# Patient Record
Sex: FEMALE | Race: WHITE | HISPANIC OR LATINO | Employment: UNEMPLOYED | URBAN - METROPOLITAN AREA
[De-identification: names, ages, dates, MRNs, and addresses within clinical notes are randomized per-mention and may not be internally consistent; named-entity substitution may affect disease eponyms.]

---

## 2023-04-17 ENCOUNTER — OFFICE VISIT (OUTPATIENT)
Dept: PRIMARY CARE | Facility: CLINIC | Age: 48
End: 2023-04-17
Payer: COMMERCIAL

## 2023-04-17 VITALS
BODY MASS INDEX: 42.78 KG/M2 | RESPIRATION RATE: 16 BRPM | OXYGEN SATURATION: 99 % | HEART RATE: 88 BPM | HEIGHT: 65 IN | WEIGHT: 256.8 LBS | DIASTOLIC BLOOD PRESSURE: 68 MMHG | SYSTOLIC BLOOD PRESSURE: 136 MMHG | TEMPERATURE: 97.4 F

## 2023-04-17 DIAGNOSIS — D17.23 LIPOMA OF RIGHT LOWER EXTREMITY: ICD-10-CM

## 2023-04-17 DIAGNOSIS — H61.21 IMPACTED CERUMEN OF RIGHT EAR: ICD-10-CM

## 2023-04-17 DIAGNOSIS — J30.2 SEASONAL ALLERGIES: Primary | ICD-10-CM

## 2023-04-17 DIAGNOSIS — E11.9 TYPE 2 DIABETES MELLITUS WITHOUT COMPLICATION, WITHOUT LONG-TERM CURRENT USE OF INSULIN (MULTI): ICD-10-CM

## 2023-04-17 DIAGNOSIS — E66.01 CLASS 3 SEVERE OBESITY DUE TO EXCESS CALORIES WITH SERIOUS COMORBIDITY AND BODY MASS INDEX (BMI) OF 40.0 TO 44.9 IN ADULT (MULTI): ICD-10-CM

## 2023-04-17 DIAGNOSIS — Z00.00 PHYSICAL EXAM: ICD-10-CM

## 2023-04-17 LAB — POC HEMOGLOBIN A1C: 6.7 % (ref 4.2–6.5)

## 2023-04-17 PROCEDURE — 1036F TOBACCO NON-USER: CPT | Performed by: NURSE PRACTITIONER

## 2023-04-17 PROCEDURE — 3075F SYST BP GE 130 - 139MM HG: CPT | Performed by: NURSE PRACTITIONER

## 2023-04-17 PROCEDURE — 3078F DIAST BP <80 MM HG: CPT | Performed by: NURSE PRACTITIONER

## 2023-04-17 PROCEDURE — 3008F BODY MASS INDEX DOCD: CPT | Performed by: NURSE PRACTITIONER

## 2023-04-17 PROCEDURE — 99214 OFFICE O/P EST MOD 30 MIN: CPT | Performed by: NURSE PRACTITIONER

## 2023-04-17 PROCEDURE — 4010F ACE/ARB THERAPY RXD/TAKEN: CPT | Performed by: NURSE PRACTITIONER

## 2023-04-17 PROCEDURE — 83036 HEMOGLOBIN GLYCOSYLATED A1C: CPT | Performed by: NURSE PRACTITIONER

## 2023-04-17 RX ORDER — SEMAGLUTIDE 1.34 MG/ML
0.5 INJECTION, SOLUTION SUBCUTANEOUS
COMMUNITY
Start: 2022-01-06 | End: 2023-07-27 | Stop reason: SDUPTHER

## 2023-04-17 RX ORDER — METFORMIN HYDROCHLORIDE 500 MG/1
1000 TABLET, EXTENDED RELEASE ORAL 2 TIMES DAILY
COMMUNITY
Start: 2018-08-03 | End: 2024-02-26 | Stop reason: SDUPTHER

## 2023-04-17 RX ORDER — LISINOPRIL 10 MG/1
10 TABLET ORAL DAILY
COMMUNITY
Start: 2022-02-04

## 2023-04-17 RX ORDER — ASPIRIN 81 MG/1
81 TABLET ORAL DAILY
COMMUNITY
Start: 2019-09-11

## 2023-04-17 RX ORDER — ROSUVASTATIN CALCIUM 10 MG/1
10 TABLET, COATED ORAL DAILY
COMMUNITY
End: 2023-10-19 | Stop reason: SINTOL

## 2023-04-17 RX ORDER — CLOPIDOGREL BISULFATE 75 MG/1
75 TABLET ORAL DAILY
COMMUNITY
Start: 2022-03-04

## 2023-04-17 RX ORDER — METOPROLOL SUCCINATE 25 MG/1
25 TABLET, EXTENDED RELEASE ORAL DAILY
COMMUNITY

## 2023-04-17 RX ORDER — ICOSAPENT ETHYL 1 G/1
2 CAPSULE ORAL
COMMUNITY
End: 2023-07-25 | Stop reason: SDUPTHER

## 2023-04-17 RX ORDER — MONTELUKAST SODIUM 10 MG/1
10 TABLET ORAL NIGHTLY
Qty: 30 TABLET | Refills: 3 | Status: SHIPPED | OUTPATIENT
Start: 2023-04-17 | End: 2023-04-24 | Stop reason: SINTOL

## 2023-04-17 RX ORDER — DAPAGLIFLOZIN 5 MG/1
5 TABLET, FILM COATED ORAL DAILY
COMMUNITY
Start: 2021-11-08

## 2023-04-17 ASSESSMENT — ENCOUNTER SYMPTOMS
NAUSEA: 0
ABDOMINAL PAIN: 0
FREQUENCY: 0
DYSURIA: 0

## 2023-04-17 ASSESSMENT — PATIENT HEALTH QUESTIONNAIRE - PHQ9
SUM OF ALL RESPONSES TO PHQ9 QUESTIONS 1 AND 2: 0
1. LITTLE INTEREST OR PLEASURE IN DOING THINGS: NOT AT ALL
2. FEELING DOWN, DEPRESSED OR HOPELESS: NOT AT ALL

## 2023-04-17 ASSESSMENT — PAIN SCALES - GENERAL: PAINLEVEL: 2

## 2023-04-17 NOTE — PROGRESS NOTES
"Subjective   Patient ID: Patricia Gibbs is a 47 y.o. female who presents for New Patient Visit (Establish Care) and Ear Problem (R Ear blockage).    HPI     Patient presents to clinic to establish care.  She tells me she was last seen by her PCP February 2021.  She states she has had an insurance change and needed a new provider.    Patient with past medical history of diabetes mellitus, MI 2019 with stent placement.  She has been followed by cardiologist Dr. Sepulveda at [facility] last seen January 2023.    Patient also being followed by endocrinology last seen by endocrinology November 22, 2022.  She tells me her last hemoglobin A1c was 6.8.    Today patient complains of seasonal allergies watery itchy eyes sneezing.  She states she has been using over-the-counter allergy medication without relief of symptoms.  She also complains of right ear feeling blocked.    Last seen by gynecology July 2022 when she had an IUD placed.      Patient also has concerns about her weight and would like to be referred to weight loss center.  She states she has tried multiple diets without success in weight loss.  Appetite normal bowel and bladder normal.    Unsuccessful right ear irrigation patient will use debrox and return in 2 days.      Review of Systems   HENT:          See HPI   Gastrointestinal:  Negative for abdominal pain and nausea.   Genitourinary:  Negative for dysuria, frequency and pelvic pain.   Skin:         Lump back of right thigh x years   All other systems reviewed and are negative.        Objective   /68 (BP Location: Left arm, Patient Position: Sitting, BP Cuff Size: Large adult)   Pulse 88   Temp 36.3 °C (97.4 °F) (Temporal)   Resp 16   Ht 1.638 m (5' 4.5\")   Wt 116 kg (256 lb 12.8 oz)   LMP 04/10/2023 (Exact Date)   SpO2 99%   BMI 43.40 kg/m²     Physical Exam  Constitutional:       Appearance: Normal appearance.   HENT:      Right Ear: There is impacted cerumen.      Left Ear: " Tympanic membrane and external ear normal.      Mouth/Throat:      Mouth: Mucous membranes are moist.      Pharynx: Oropharynx is clear.   Eyes:      Pupils: Pupils are equal, round, and reactive to light.   Cardiovascular:      Rate and Rhythm: Normal rate and regular rhythm.   Pulmonary:      Effort: Pulmonary effort is normal.      Breath sounds: Normal breath sounds.   Abdominal:      General: Bowel sounds are normal.      Palpations: Abdomen is soft.   Musculoskeletal:         General: Normal range of motion.      Cervical back: Normal range of motion and neck supple.   Skin:     General: Skin is warm and dry.      Comments: R/O Lipoma mid right thigh    Neurological:      Mental Status: She is alert and oriented to person, place, and time.   Psychiatric:         Mood and Affect: Mood normal.         Assessment/Plan   Problem List Items Addressed This Visit    None  Visit Diagnoses       Seasonal allergies    -  Primary    Relevant Medications    montelukast (Singulair) 10 mg tablet    Type 2 diabetes mellitus without complication, without long-term current use of insulin (CMS/[facility])        Relevant Orders    POCT glycosylated hemoglobin (Hb A1C) manually resulted (Completed)    CBC    Comprehensive Metabolic Panel    Lipid Panel    Hemoglobin A1C    Tsh With Reflex To Free T4 If Abnormal    Vitamin D 25-Hydroxy,Total    Physical exam        Relevant Orders    BI mammo bilateral screening tomosynthesis    Cologuard® colon cancer screening    Impacted cerumen of right ear        Relevant Medications    carbamide peroxide (Debrox) 6.5 % otic solution  Return in 2 days for ear irrigation    Other Relevant Orders    Ear cerumen removal    Class 3 severe obesity due to excess calories with serious comorbidity and body mass index (BMI) of 40.0 to 44.9 in adult (CMS/[facility])        Relevant Orders    Referral to Comprehensive Weight Management    Referral to Nutrition Services    Lipoma of right lower extremity         Relevant Orders    Referral to Dermatology

## 2023-04-17 NOTE — PATIENT INSTRUCTIONS
Your Hemoglobin A1c is   Continue medications as prescribed.  Healthy diet and drink plenty of water.  Please have labs drawn-You will be notified with abnormal results only.    Please set up MY CHART.    Please schedule all necessary health screenings ophthalmology and dentist.    Follow-up in 3 months.  Call office any new concerns.  Use Debrox in right ear x 2 days and return  for ear irrigation.  Follow-up with dermatology.

## 2023-04-19 ENCOUNTER — APPOINTMENT (OUTPATIENT)
Dept: PRIMARY CARE | Facility: CLINIC | Age: 48
End: 2023-04-19
Payer: COMMERCIAL

## 2023-04-24 ENCOUNTER — TELEPHONE (OUTPATIENT)
Dept: PRIMARY CARE | Facility: CLINIC | Age: 48
End: 2023-04-24

## 2023-04-24 DIAGNOSIS — J30.2 SEASONAL ALLERGIES: Primary | ICD-10-CM

## 2023-04-24 RX ORDER — LORATADINE 10 MG/1
10 TABLET ORAL DAILY
Qty: 30 TABLET | Refills: 2 | Status: SHIPPED | OUTPATIENT
Start: 2023-04-24 | End: 2023-05-24

## 2023-04-24 NOTE — PROGRESS NOTES
Patient notified office stating she thinks she could possibly have a reaction to Singulair that was prescribed for her on 4/17/23.  Patient complains of itching but denies having a rash or trouble breathing.  Advised to discontinue Singulair we will have patient started Claritin for her seasonal allergies.

## 2023-04-24 NOTE — TELEPHONE ENCOUNTER
Pt. Calling states possible reaction to Singulair 10mg.  Started taking on Tuesday, sx of itching on Thursday. Pt. States Rx stops when taking Benadryl.     Would also like to note Earwax was removed after coming home from her OV on Monday 4/17 with no success during Ear Irrigation.

## 2023-05-04 DIAGNOSIS — N64.89 BREAST ASYMMETRY: Primary | ICD-10-CM

## 2023-05-04 NOTE — PROGRESS NOTES
Phoned patient to discuss results of mammogram.  Bilateral breast ultrasound ordered as recommended by radiologist for mammogram 5/4/2023.

## 2023-06-01 DIAGNOSIS — J30.2 SEASONAL ALLERGIES: ICD-10-CM

## 2023-06-02 RX ORDER — LORATADINE 10 MG/1
TABLET ORAL
Qty: 30 TABLET | Refills: 0 | Status: SHIPPED | OUTPATIENT
Start: 2023-06-02 | End: 2023-10-19 | Stop reason: ALTCHOICE

## 2023-06-15 NOTE — TELEPHONE ENCOUNTER
Phoned patient to discuss results of breast ultrasound.  Patient states after testing radiologist spoke with her.  Impression is right breast cyst probably benign and recommends 6-month follow-up.

## 2023-07-19 ENCOUNTER — LAB (OUTPATIENT)
Dept: LAB | Facility: LAB | Age: 48
End: 2023-07-19
Payer: COMMERCIAL

## 2023-07-19 DIAGNOSIS — E11.9 TYPE 2 DIABETES MELLITUS WITHOUT COMPLICATION, WITHOUT LONG-TERM CURRENT USE OF INSULIN (MULTI): ICD-10-CM

## 2023-07-19 LAB
ALANINE AMINOTRANSFERASE (SGPT) (U/L) IN SER/PLAS: 10 U/L (ref 7–45)
ALBUMIN (G/DL) IN SER/PLAS: 4.2 G/DL (ref 3.4–5)
ALKALINE PHOSPHATASE (U/L) IN SER/PLAS: 53 U/L (ref 33–110)
ANION GAP IN SER/PLAS: 12 MMOL/L (ref 10–20)
ASPARTATE AMINOTRANSFERASE (SGOT) (U/L) IN SER/PLAS: 12 U/L (ref 9–39)
BILIRUBIN TOTAL (MG/DL) IN SER/PLAS: 0.3 MG/DL (ref 0–1.2)
CALCIDIOL (25 OH VITAMIN D3) (NG/ML) IN SER/PLAS: 35 NG/ML
CALCIUM (MG/DL) IN SER/PLAS: 9.2 MG/DL (ref 8.6–10.3)
CARBON DIOXIDE, TOTAL (MMOL/L) IN SER/PLAS: 26 MMOL/L (ref 21–32)
CHLORIDE (MMOL/L) IN SER/PLAS: 107 MMOL/L (ref 98–107)
CHOLESTEROL (MG/DL) IN SER/PLAS: 196 MG/DL (ref 0–199)
CHOLESTEROL IN HDL (MG/DL) IN SER/PLAS: 45.6 MG/DL
CHOLESTEROL/HDL RATIO: 4.3
CREATININE (MG/DL) IN SER/PLAS: 1.17 MG/DL (ref 0.5–1.05)
ERYTHROCYTE DISTRIBUTION WIDTH (RATIO) BY AUTOMATED COUNT: 15.9 % (ref 11.5–14.5)
ERYTHROCYTE MEAN CORPUSCULAR HEMOGLOBIN CONCENTRATION (G/DL) BY AUTOMATED: 30.5 G/DL (ref 32–36)
ERYTHROCYTE MEAN CORPUSCULAR VOLUME (FL) BY AUTOMATED COUNT: 83 FL (ref 80–100)
ERYTHROCYTES (10*6/UL) IN BLOOD BY AUTOMATED COUNT: 4.8 X10E12/L (ref 4–5.2)
ESTIMATED AVERAGE GLUCOSE FOR HBA1C: 143 MG/DL
GFR FEMALE: 58 ML/MIN/1.73M2
GLUCOSE (MG/DL) IN SER/PLAS: 147 MG/DL (ref 74–99)
HEMATOCRIT (%) IN BLOOD BY AUTOMATED COUNT: 39.7 % (ref 36–46)
HEMOGLOBIN (G/DL) IN BLOOD: 12.1 G/DL (ref 12–16)
HEMOGLOBIN A1C/HEMOGLOBIN TOTAL IN BLOOD: 6.6 %
LDL: 118 MG/DL (ref 0–99)
LEUKOCYTES (10*3/UL) IN BLOOD BY AUTOMATED COUNT: 9.9 X10E9/L (ref 4.4–11.3)
NRBC (PER 100 WBCS) BY AUTOMATED COUNT: 0 /100 WBC (ref 0–0)
PLATELETS (10*3/UL) IN BLOOD AUTOMATED COUNT: 269 X10E9/L (ref 150–450)
POTASSIUM (MMOL/L) IN SER/PLAS: 4.9 MMOL/L (ref 3.5–5.3)
PROTEIN TOTAL: 6.7 G/DL (ref 6.4–8.2)
SODIUM (MMOL/L) IN SER/PLAS: 140 MMOL/L (ref 136–145)
THYROTROPIN (MIU/L) IN SER/PLAS BY DETECTION LIMIT <= 0.05 MIU/L: 2.43 MIU/L (ref 0.44–3.98)
TRIGLYCERIDE (MG/DL) IN SER/PLAS: 162 MG/DL (ref 0–149)
UREA NITROGEN (MG/DL) IN SER/PLAS: 16 MG/DL (ref 6–23)
VLDL: 32 MG/DL (ref 0–40)

## 2023-07-19 PROCEDURE — 84443 ASSAY THYROID STIM HORMONE: CPT

## 2023-07-19 PROCEDURE — 36415 COLL VENOUS BLD VENIPUNCTURE: CPT

## 2023-07-19 PROCEDURE — 83036 HEMOGLOBIN GLYCOSYLATED A1C: CPT

## 2023-07-19 PROCEDURE — 80053 COMPREHEN METABOLIC PANEL: CPT

## 2023-07-19 PROCEDURE — 82306 VITAMIN D 25 HYDROXY: CPT

## 2023-07-19 PROCEDURE — 80061 LIPID PANEL: CPT

## 2023-07-19 PROCEDURE — 85027 COMPLETE CBC AUTOMATED: CPT

## 2023-07-21 ENCOUNTER — APPOINTMENT (OUTPATIENT)
Dept: PRIMARY CARE | Facility: CLINIC | Age: 48
End: 2023-07-21
Payer: COMMERCIAL

## 2023-07-21 ENCOUNTER — TELEPHONE (OUTPATIENT)
Dept: PRIMARY CARE | Facility: CLINIC | Age: 48
End: 2023-07-21

## 2023-07-21 NOTE — TELEPHONE ENCOUNTER
----- Message from SEBASTIEN Parker sent at 7/21/2023 11:39 AM EDT -----  Regarding: Labs  Please notify patient with lab results.  Hemoglobin A1c slightly improved advised patient to continue medications as prescribed continue watching carbohydrates and sugars in the diet.  Some elevation with the cholesterol level advised to continue cholesterol-lowering medication as prescribed.  Patient also advised to increase water intake.  ----- Message -----  From: Lab, Background User  Sent: 7/19/2023   3:22 PM EDT  To: SEBASTIEN Parker

## 2023-07-21 NOTE — TELEPHONE ENCOUNTER
[facility] Will also send MyChart message.

## 2023-07-25 ENCOUNTER — OFFICE VISIT (OUTPATIENT)
Dept: PRIMARY CARE | Facility: CLINIC | Age: 48
End: 2023-07-25
Payer: COMMERCIAL

## 2023-07-25 VITALS
BODY MASS INDEX: 43.06 KG/M2 | OXYGEN SATURATION: 98 % | TEMPERATURE: 98.3 F | HEART RATE: 84 BPM | SYSTOLIC BLOOD PRESSURE: 128 MMHG | RESPIRATION RATE: 18 BRPM | DIASTOLIC BLOOD PRESSURE: 74 MMHG | WEIGHT: 254.8 LBS

## 2023-07-25 DIAGNOSIS — E78.5 HYPERLIPIDEMIA, UNSPECIFIED HYPERLIPIDEMIA TYPE: ICD-10-CM

## 2023-07-25 DIAGNOSIS — G56.01 CARPAL TUNNEL SYNDROME OF RIGHT WRIST: Primary | ICD-10-CM

## 2023-07-25 DIAGNOSIS — M25.531 WRIST PAIN, ACUTE, RIGHT: ICD-10-CM

## 2023-07-25 PROCEDURE — L3908 WHO COCK-UP NONMOLDE PRE OTS: HCPCS | Performed by: NURSE PRACTITIONER

## 2023-07-25 PROCEDURE — 1036F TOBACCO NON-USER: CPT | Performed by: NURSE PRACTITIONER

## 2023-07-25 PROCEDURE — 99214 OFFICE O/P EST MOD 30 MIN: CPT | Performed by: NURSE PRACTITIONER

## 2023-07-25 PROCEDURE — 3008F BODY MASS INDEX DOCD: CPT | Performed by: NURSE PRACTITIONER

## 2023-07-25 RX ORDER — FAMOTIDINE 10 MG/1
10 TABLET ORAL DAILY
COMMUNITY

## 2023-07-25 RX ORDER — MULTIVITAMIN
2 TABLET ORAL 2 TIMES DAILY
COMMUNITY

## 2023-07-25 RX ORDER — ICOSAPENT ETHYL 1 G/1
2 CAPSULE ORAL
Qty: 480 CAPSULE | Refills: 2 | Status: SHIPPED | OUTPATIENT
Start: 2023-07-25 | End: 2024-07-24

## 2023-07-25 ASSESSMENT — PAIN SCALES - GENERAL: PAINLEVEL: 3

## 2023-07-25 NOTE — PROGRESS NOTES
Subjective   Patient ID: Patricia Gibbs is a 47 y.o. female who presents for Follow-up (Would like to discuss menopause, believes she may be going through. Reports hands going numb in sleep and hip pain. ).    HPI   Patient presents to clinic for follow-up visit.    Today patient complains of right wrist pain and numbness x5 years.  She also states occasionally she gets tingling up the arm.  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  She denies trauma or injury.     Today she also complains of hip pain and tingling after standing for long periods of time.      Patient is diabetic checks blood sugars at home ranges  and normally.  Hemoglobin A1c 6.6.    Had discussion with the patient concerning menopause.  Patient also advised to follow-up with gynecologist.  Review of Systems   Musculoskeletal:         See HPI       Objective   /74 (BP Location: Right arm, Patient Position: Sitting, BP Cuff Size: Large adult)   Pulse 84   Temp 36.8 °C (98.3 °F) (Temporal)   Resp 18   Wt 116 kg (254 lb 12.8 oz)   LMP 07/24/2023 (Exact Date) Comment: Has an IUD  SpO2 98%   BMI 43.06 kg/m²     Physical Exam  Constitutional:       Appearance: Normal appearance. She is not ill-appearing.   Cardiovascular:      Rate and Rhythm: Normal rate and regular rhythm.   Pulmonary:      Effort: Pulmonary effort is normal.      Breath sounds: Normal breath sounds.   Musculoskeletal:      Right wrist: Tenderness (Left thumb and left wrist) present. No swelling or snuff box tenderness. Normal range of motion.      Left wrist: Normal.      Left hip: Tenderness (Tenderness upon palpation mid left hip) present. Normal range of motion.   Skin:     General: Skin is warm and dry.   Neurological:      Mental Status: She is alert and oriented to person, place, and time.         Assessment/Plan   Problem List Items Addressed This Visit    None  Visit Diagnoses       Carpal tunnel syndrome of right wrist    -  Primary     Wrist pain, acute, right        Relevant Orders    Wrist splint  Tylenol ibuprofen as needed  Consider physical therapy  Refer to hand surgeon if needed      Hyperlipidemia, unspecified hyperlipidemia type        Relevant Medications    icosapent ethyL (Vascepa) 1 gram capsule

## 2023-07-25 NOTE — PATIENT INSTRUCTIONS
Continue medications as prescribed.  Healthy diet and drink plenty of water.  Wear wrist splint while working. Rest Ice Tylenol/Ibuprofen  as needed.Will refer to hand hand  Surgeon if needed  Follow-up in 4 months sooner if needed  Call office any new concerns.

## 2023-07-27 DIAGNOSIS — E11.9 TYPE 2 DIABETES MELLITUS WITHOUT COMPLICATION, WITHOUT LONG-TERM CURRENT USE OF INSULIN (MULTI): Primary | ICD-10-CM

## 2023-07-27 NOTE — TELEPHONE ENCOUNTER
Rx Refill Request Telephone Encounter    Name:  Patricia Gibbs  :  [id number]  Medication Name:  ozempic pen injector  Specific Pharmacy location:    [facility]/pharmacy #[id number] - [facility] [address] [address]  [address] [facility] [address]  Phone: [phone] Fax: [phone]  Date of last appointment:  2023  Date of next appointment:  None scheduled  Best number to reach patient:  [phone]        Patient needs 90 day supply sent to pharmacy for insurance company to cover otherwise is $500 OOP cost.

## 2023-07-28 RX ORDER — SEMAGLUTIDE 1.34 MG/ML
0.5 INJECTION, SOLUTION SUBCUTANEOUS
Qty: 1 EACH | Refills: 5 | Status: SHIPPED | OUTPATIENT
Start: 2023-07-28 | End: 2024-03-19 | Stop reason: SDUPTHER

## 2023-08-02 ENCOUNTER — TELEPHONE (OUTPATIENT)
Dept: PRIMARY CARE | Facility: CLINIC | Age: 48
End: 2023-08-02
Payer: COMMERCIAL

## 2023-08-02 NOTE — TELEPHONE ENCOUNTER
Called and spoke to patient to notify that referral was submitted to PCP for review as well as notifying that a PA was submitted for the vescepa. Patient verbalized understanding.

## 2023-08-02 NOTE — TELEPHONE ENCOUNTER
Patient calling in stating that she started the bariatric process and has an appointment scheduled in September with a provider. She states she was told that prior to this appointment she needed a sleep apnea study to be completed. Please advise, thank you.     Patient also stated that she needed a PA completed for her vescepa 1gm capsule.    PA completed thru covermymed- awaiting response.

## 2023-08-02 NOTE — TELEPHONE ENCOUNTER
Called patient to notify that PA for vescepa has been approved thru [facility].     Dates approved: 8/2/2023-8/1/2026.     PA approval placed into scanning.

## 2023-10-17 PROBLEM — Z98.84 BARIATRIC SURGERY STATUS: Status: ACTIVE | Noted: 2023-10-17

## 2023-10-17 PROBLEM — Z95.5 STENTED CORONARY ARTERY: Status: ACTIVE | Noted: 2023-03-06

## 2023-10-17 PROBLEM — I25.110 CORONARY ARTERY DISEASE INVOLVING NATIVE CORONARY ARTERY OF NATIVE HEART WITH UNSTABLE ANGINA PECTORIS (MULTI): Status: ACTIVE | Noted: 2019-09-06

## 2023-10-17 PROBLEM — I21.3 STEMI (ST ELEVATION MYOCARDIAL INFARCTION) (MULTI): Status: ACTIVE | Noted: 2019-09-05

## 2023-10-17 PROBLEM — E11.9 DIABETES MELLITUS (MULTI): Status: ACTIVE | Noted: 2023-10-17

## 2023-10-17 PROBLEM — E66.9 OBESITY: Status: ACTIVE | Noted: 2019-09-05

## 2023-10-17 NOTE — PROGRESS NOTES
Surgeon: Danilo  Patient is considering: Gastric Bypass    ASSESSMENT:  Current weight:  256.5 lbs   Ht: 64  in   BMI: 44       Initial start weight: 256 lbs (7.20.23)  Pre-Op Excess Body Weight (EBW):   111 lbs  Target Post-Op weight goal:  167 - 189 lbs    Food allergies/intolerances:  NKFA  Chewing/Swallowing/Dentition:  none  Nausea / Vomiting / Hx Gastroparesis: none   Diarrhea/ Constipation: none  Exercise level:  none  Smoking/Tobacco use: none  Vitamins/Minerals supplements:  MVI  Medications:   see list  Past diet attempts:   Keto, IMF low calorie, low carb  Hours of sleep/night: 8    24 HOUR RECALL/DIET HISTORY:  Breakfast:  yogurt  Snack:    Lunch: none  Snack:   Dinner: stuffed pepper x1, 2 slices garlic bread  Snack: none  Beverages: 24 oz coffee w/ creamer, diet pop, carbonated water, minimal water  Alcohol: none    Person responsible for cooking & shopping? Self  How often do you eat sweet snacks?  Daily  How often do you eat savory snacks?  Not often  How often do you eat out?  Twice/week  Do you feel overly stuffed?  Not as often  Binge Eating? No, but thinks she may have in the past  Night Eating?  No, but mentions eating late at night  Emotional Eating?  No, but has in the past       READINESS TO LEARN:  Motivation to learn: Interested        Understanding of instruction: Good   Anticipated Compliance: Good       Family Support: Strong, dtr was present during evaluation    Educational Materials Provided:    Plate Method  High Protein Snack List  High Protein Drink  High Protein food list  Schedules for MSWL class and Support Group Schedule  Goals sheet    Patient will scheduled to attend a Virtual Education Class to review the 2 week Pre-op diet, Post op fluid, protein, vitamin/mineral supplementation, exercise goals and post op diet progression.    Patient presents with excessive calorie obesity seeking gastric bypass.    Pt seen today to complete nutrition evaluation for WLS. Patient reports  trying multiple diets in the past to lose weight. Enjoys cooking and baking, often feels she likes to eat more than she needs. Reports challenge with drinking water. No exercise at this time, but likes to walk, has equipment at home. Patient Reports often eating only 1-2 meals/day. Skips meals d/t busy and lack of hunger at meal time. But then becomes overly hungry later in the day. Tries to limit snacking, but does often snack on sweets after dinner.  Recommended to structure meal plan, and eat 3 meals/day. Encouraged to try protein drink or protein bar and fruit to have instead of skipping meal. Discussed fluids to eliminate and encouraged to try crystal light or other SF beverages that are non-carbonated to meet a goal of 64oz/day. Recommended to not drink with meals. Recommended to begin exercising 20 minutes 3 days/week. Encouraged to attend SG meetings. Patient will attend MSWL class next 2 months and follow up with RD in 3 months.     Patient was receptive to nutritional recommendations, asked numerous questions, and verbalized understanding of the weight loss surgery diet.  Patient expressed understanding about the importance of strict dietary compliance post-surgery to avoid nutritional deficiencies and achieve optimal weight loss and verbalized intent to follow dietary recommendations.      Malnutrition Screening:  Significant unintentional weight loss? No  Eating less than 75% of usual intake for more than 2 weeks? No      Nutrition Diagnosis:   1. Overweight/obesity related to excess energy intake as evidenced by BMI = 40 kg/m^2.  2. Food- and nutrition-related knowledge deficit related to lack of prior exposure to surgical weight loss information as evidenced by pt new to surgical program.    Nutrition Interventions:   1. Modify type and amount of food and nutrients within meals and snacks.  2. Comprehensive Nutrition Education    Recommendations:  1. Structure meal patterns, eating three meals daily.  Have a protein bar or protein drink in place of a meal to help avoid skipping meals.  2. Balance your meals with a good source of protein, aiming for 3-4 oz at meals (20-30 grams). Fill half your plate at lunch/dinner meals with 1-2 cups of non-starchy vegetables and 1 cup of fiber rich starch.   3. Drink 64oz of calorie-free, caffeine-free, and non-carbonated beverages. Use a water bottle to track your intake daily.  4. Practice no drinking with meals and try not to chug or gulp fluids.  6. Begin exercising for 20 minutes 3 days/week. Either walk outside or use your bike/weights at home.  7. Your insurance requires 6 months of Medically Supervised Weight Loss (MSWL) classes. You will need to attend a class in November, and December. We will follow up for your 3rd visit on January 22 at 1:30 PM. You will need to weigh yourself before this appointment and provide a 24 hour food recall.        Pre-op Goal weight: lose 5% of body weight    Nutrition Monitoring and Evaluation: 1-2 pound weight loss per week  Criteria: weight check  Need for Follow-up:     Patient does meet National [employer] Health guidelines for weight loss surgery, however needs to demonstrate consistent effort in making dietary changes before giving clearance. It is anticipated that the patient will need at least  1   nutritional follow-up visits prior to clearance for surgery.

## 2023-10-17 NOTE — PROGRESS NOTES
[facility] Pharmacy Note:  Dose Adjustment for Levaquin (levofloxacin)    Noe Powell is a 80year old female who has been prescribed Levaquin (levofloxacin) 500 mg. CrCl is estimated creatinine clearance is 39.5 mL/min (based on Cr of 0.8).  so the dose has Subjective     Date: 10/19/2023 Time: 1:42 PM  Name: Patricia Gibbs  MRN: [id number]    This is a 47 y.o. female with morbid obesity (Body mass index is 44.03 kg/m².) who presents to clinic for consideration of bariatric surgery. she has attempted and failed multiple diet and exercise regimens for weight loss. Initial Onset of obesity was during adolescence and after pregnancy.  Their goal for surgery is to  be healthier  and lose weight. The patient has tried multiple diets to lose weight including Weight Watchers, Low Calorie, and keto . The patient was most successful with the low calorie diet.  The patient considers their dietary weakness to be  carbs, emotional eating, late night snacking  The patient reports a  highest weight ever of 312 pounds and lowest weight ever of 140 pounds Distribution of Obesity: is central. Current diet:  1500 Calories Per Day. Compliance: General Adherence Diet Problems: The patient exercises daily  15 Minutes/Day Types of Exercise : walking    Comorbidities: esophageal reflux, heart disease, high cholesterol, diabetes managed by oral medication , and hypertension controlled with oral meds    Gastric ByPass    2 = Symptoms noticeable and bothersome but not every day    PMH:   Past Medical History:   Diagnosis Date    Coronary artery disease     Diabetes mellitus type 2 in obese (CMS/HCC)     Essential hypertension, benign     HLD (hyperlipidemia)     Obesity     STEMI (ST elevation myocardial infarction) (CMS/[facility])         PSH:   Past Surgical History:   Procedure Laterality Date    CORONARY STENT PLACEMENT  2019    on plavix    OTHER SURGICAL HISTORY  2022    Tonsillectomy    OTHER SURGICAL HISTORY  2022     section        Denies personal/family hx of VTE.    FAMILY HISTORY:  Family History   Problem Relation Name Age of Onset    Other (cva [Other] Depression, Bilpoar) Mother      Coronary artery disease Mother      No Known Problems Father      No Known  Problems Sister      Other (Diabetes,HTN) Brother      No Known Problems Son      No Known Problems Maternal Grandmother      Other (Cardiac) Maternal Grandfather      No Known Problems Paternal Grandmother      No Known Problems Paternal Grandfather          SOCIAL HISTORY:  Social History     Tobacco Use    Smoking status: Former     Years: 10     Types: Cigarettes     Quit date:      Years since quittin.8    Smokeless tobacco: Never   Substance Use Topics    Alcohol use: Yes     Comment: Socially- 2 x month    Drug use: Yes     Types: Marijuana       MEDICATIONS:  Prior to Admission Medications:  Medication Documentation Review Audit       Reviewed by Apurva Reynolds CMA (Medical Assistant) on 10/19/23 at 1333      Medication Order Taking? Sig Documenting Provider Last Dose Status   aspirin 81 mg EC tablet [id number] Yes Take 1 tablet (81 mg) by mouth once daily. Historical Provider, MD Taking Active   Discontinued 10/19/23 1330   carvedilol (Coreg) 3.125 mg tablet [id number] Yes Take by mouth. Historical Provider, MD Taking Active   cetirizine (ZyrTEC) 10 mg tablet [id number] Yes Take 1 tablet (10 mg) by mouth once daily in the morning. Historical Provider, MD Taking Active   clopidogrel (Plavix) 75 mg tablet [id number] Yes Take 1 tablet (75 mg) by mouth once daily. Historical Provider, MD Taking Active   dapagliflozin (Farxiga) 5 mg [id number] Yes Take 1 tablet (5 mg) by mouth once daily. Historical Provider, MD Taking Active   famotidine (Pepcid) 10 mg tablet [id number] Yes Take 1 tablet (10 mg) by mouth once daily. Historical Provider, MD Taking Active     Discontinued 10/19/23 1331   icosapent ethyL (Vascepa) 1 gram capsule [id number] Yes Take 2 capsules (2 g) by mouth 2 times a day with meals. Sandra Yeung, APRN-CNP Taking Active   levonorgestrel (Mirena) 21 mcg/24 hours (8 yrs) 52 mg IUD [id number] Yes by intrauterine route. Historical Provider, MD Taking Active   lisinopril 10 mg tablet [id number]  "Yes Take 1 tablet (10 mg) by mouth once daily. Historical Provider, MD Taking Active     Discontinued 10/19/23 1331   metFORMIN XR (Glucophage-XR) 500 mg 24 hr tablet [id number] Yes Take 2 tablets (1,000 mg) by mouth 2 times a day. Historical Provider, MD Taking Active   metoprolol succinate XL (Toprol-XL) 25 mg 24 hr tablet [id number] Yes Take 1 tablet (25 mg) by mouth once daily. Do not crush or chew. Historical Provider, MD Taking Active   multivitamin tablet [id number] Yes Take 2 tablets by mouth 2 times a day. Historical Provider, MD Taking Active     Discontinued 10/19/23 1332   Discontinued 10/19/23 1332   semaglutide (Ozempic) 0.25 mg or 0.5 mg(2 mg/1.5 mL) pen injector [id number] Yes Inject 0.5 mg under the skin 1 (one) time per week. Sandra Yeung, PENNIE-GAGE Taking Active                     ALLERGIES:  Allergies   Allergen Reactions    Bee Venom Protein (Honey Bee) Anaphylaxis    Codeine Hives     From cough-syrup    Montelukast Unknown       REVIEW OF SYSTEMS:  GENERAL: Negative for malaise, significant weight loss and fever  HEAD: Negative for headache, swelling.  NECK: Negative for lumps, goiter, pain and significant neck swelling  RESPIRATORY: Negative for cough, wheezing or shortness of breath.  CARDIOVASCULAR: Negative for chest pain, leg swelling or palpitations.  GI: Negative for abdominal discomfort, blood in stools or black stools or change in bowel habits  : No history of dysuria, frequency or incontinence  MUSCULOSKELETAL: Negative for joint pain or swelling, back pain or muscle pain.  SKIN: Negative for lesions, rash, and itching.  PSYCH: Negative for sleep disturbance, mood disorder and recent psychosocial stressors.  ENDOCRINE: Negative for cold or heat intolerance, polyuria, polydipsia and goiter.    Objective   PHYSICAL EXAM:  Visit Vitals  /88 (BP Location: Left arm, Patient Position: Sitting, BP Cuff Size: Large adult)   Pulse 85   Ht 1.626 m (5' 4\")   Wt 116 kg (256 lb 8 oz) "   SpO2 97%   BMI 44.03 kg/m²   OB Status Having periods   Smoking Status Former   BSA 2.29 m²     General appearance: obese, NAD  Neuro: AOx3  Head: EOMI; no swelling or lesions of scalp or face  ENT:  no lumps or lymphadenopathy, thyroid normal to palpation; oropharynx clear, no swelling or erythema  Skin: warm, no erythema or rashes  Lungs: clear to percussion and auscultation  Heart: regular rhythm and S1, S2 normal  Abdomen: soft, non-tender, no masses, no organomegaly  Extremities: Normal exam of the extremities. No swelling or pain.  Psych: no hurried speech, no flight of ideas, normal affect    Assessment/Plan   IMPRESSION:  Patricia Gibbs is a 47 y.o. female with a BMI of Body mass index is 44.03 kg/m². with the following diagnoses and co-morbidities:     Past Medical History:   Diagnosis Date    Coronary artery disease     Diabetes mellitus type 2 in obese (CMS/HCC)     Essential hypertension, benign     HLD (hyperlipidemia)     Obesity     STEMI (ST elevation myocardial infarction) (CMS/[facility])        This patient does meet the criteria for a surgical weight loss procedure according to NIH guidelines.  The risks of sleeve gastrectomy, Essie-en-Y gastric bypass, including but not limited to bleeding, leak along staple lines, infection, dehydration, ulcers, internal hernia, DVT/PE, pneumonia, myocardial infarction, prolonged nausea/vomiting, incomplete resolution of associated medical conditions, reflux, weight regain, vitamin/mineral deficiencies, and death have been explained to the patient and Patricia Gibbs has expressed understanding and acceptance of them.     We discussed the lifestyle changes necessary to be successful following surgery.    Patient has diabetes for the last few years and significant reflux issues for the last few years as well she has been on PPI for more than 2 years.  Her primary concern is resolution of diabetes and reflux issues.  We discussed both sleeve gastrectomy and  gastric bypass at length and overall I think gastric bypass would be a better option for her.  After discussing risk-benefit alternatives of both the procedures she decided to proceed with gastric bypass.    The increased risk of substance and alcohol abuse following bariatric surgery was discussed with the patient, along with the negative consequences of substance/alcohol use after surgery including addiction, worsening of mental health disorders, and injury to the stomach. The risk of smoking and vaping (tobacco or any other substance) after bariatric surgery was explained to the patient. This includes risk of anastamotic ulcers, gastritis, bleeding, perforation, stricture, and PO intolerance.  The patient expressed understanding and acceptance of these risks.    The patient was advised not to become pregnant within 12-18 months following bariatric surgery. She was educated on the increased risks to mother and fetus associated with pregnancy within 2 years of bariatric surgery.    The benefits of the above surgeries including weight loss, improvement/resolution of associated medical and mental health conditions, improved mobility, and decreased mortality have been explained the the patient and Patricia Gibbs has expressed understanding and acceptance of them.      PLAN:  The plan of treatment for Patricia Gibbs is to continue with the consultations and tests ordered today in hopes of qualifying for pre-operative clearance for bariatric surgery. This includes:    Consult Nutrition for education and 6 months of MSWL  Consult Psychology  Consult Cardiology  Consult Pulmonology  Labs ordered  EGD  PCP for medical optimization  Consult sleep medicine - concern for MARCUS  Recommend at least 15 lbs of weight loss prior to surgery.

## 2023-10-17 NOTE — PATIENT INSTRUCTIONS
The following are some lifestyle changes you should begin to prepare you for your surgery.   Eliminate soda and other carbonated beverages from your diet. Carbonation will not be well tolerated after surgery. Try Propel, Vitamin Water Zero, Sobe Lifewater, Crystal Light or water.    Increase fluid consumption to 64 oz daily. Do not drink within 30 minutes of eating as this will liquefy your food and make you hungry more quickly.    Exercise for 30-60 minutes daily. Brisk walking, bike riding and swimming are all examples of healthy exercise. If you are unable to exercise we recommend seated exercise.    Do not skip meals.    Take a multivitamin daily.    Lose weight. In preparation for your surgery it is important that you begin making healthier food choices now. Our dietitian will meet with you to help you select foods lower in calories and higher in nutrition. We would like you to lose at least 10  lbs prior to surgery.     Increase your protein intake to 60 grams per day.    Alcohol is empty calories. Please eliminate while preparing for surgery.    Plan your meals.      General Instruction:   1) Use the information we gave you today to work through your insurance requirements and medical clearances.   2) These documents need to get faxed or emailed to the program navigators so they can submit them for approval from your insurance company.   3) Obtain labs today at a  facility. We will call you with any abnormalities and corrections you need to make.   4) Continue to work with your primary care doctor and other specialist so your other health problems are well controlled prior to your surgery.   5) Adopt the recommendations of the program dietician so you develop healthy eating patterns.   6) Work with the sleep team to get your sleep apnea treated to prevent other health problems .   7) Consider attending a support group to learn from other who have been through the process.   8) [facility] to the [facility]  sessions.

## 2023-10-18 PROBLEM — N92.0 MENORRHAGIA: Status: ACTIVE | Noted: 2023-10-18

## 2023-10-18 PROBLEM — E87.5 HYPERKALEMIA: Status: ACTIVE | Noted: 2023-03-10

## 2023-10-18 PROBLEM — B37.31 VAGINAL YEAST INFECTION: Status: ACTIVE | Noted: 2023-10-18

## 2023-10-18 PROBLEM — D17.5 LIPOMA OF STOMACH: Status: ACTIVE | Noted: 2023-10-18

## 2023-10-18 PROBLEM — D17.23 LIPOMA OF RIGHT THIGH: Status: ACTIVE | Noted: 2023-08-09

## 2023-10-18 PROBLEM — R31.9 HEMATURIA: Status: ACTIVE | Noted: 2023-10-18

## 2023-10-18 PROBLEM — I10 HYPERTENSION: Status: ACTIVE | Noted: 2019-09-05

## 2023-10-18 PROBLEM — R39.9 LOWER URINARY TRACT SYMPTOMS (LUTS): Status: ACTIVE | Noted: 2023-10-18

## 2023-10-18 PROBLEM — E78.2 MIXED HYPERLIPIDEMIA: Status: ACTIVE | Noted: 2023-03-06

## 2023-10-18 RX ORDER — CETIRIZINE HYDROCHLORIDE 10 MG/1
10 TABLET ORAL EVERY MORNING
COMMUNITY

## 2023-10-18 RX ORDER — FAMOTIDINE 20 MG/1
1 TABLET, FILM COATED ORAL NIGHTLY
COMMUNITY
End: 2023-10-19 | Stop reason: ALTCHOICE

## 2023-10-18 RX ORDER — CARVEDILOL 3.12 MG/1
TABLET ORAL
COMMUNITY
End: 2023-12-01 | Stop reason: ALTCHOICE

## 2023-10-18 RX ORDER — LEVONORGESTREL 52 MG/1
INTRAUTERINE DEVICE INTRAUTERINE
COMMUNITY

## 2023-10-18 RX ORDER — ATORVASTATIN CALCIUM 40 MG/1
TABLET, FILM COATED ORAL
COMMUNITY
End: 2023-10-19 | Stop reason: ALTCHOICE

## 2023-10-19 ENCOUNTER — OFFICE VISIT (OUTPATIENT)
Dept: SURGERY | Facility: CLINIC | Age: 48
End: 2023-10-19
Payer: COMMERCIAL

## 2023-10-19 ENCOUNTER — NUTRITION (OUTPATIENT)
Dept: SURGERY | Facility: CLINIC | Age: 48
End: 2023-10-19
Payer: COMMERCIAL

## 2023-10-19 VITALS — BODY MASS INDEX: 43.79 KG/M2 | HEIGHT: 64 IN | WEIGHT: 256.5 LBS

## 2023-10-19 VITALS
HEIGHT: 64 IN | HEART RATE: 85 BPM | BODY MASS INDEX: 43.79 KG/M2 | WEIGHT: 256.5 LBS | DIASTOLIC BLOOD PRESSURE: 88 MMHG | OXYGEN SATURATION: 97 % | SYSTOLIC BLOOD PRESSURE: 137 MMHG

## 2023-10-19 DIAGNOSIS — Z98.84 BARIATRIC SURGERY STATUS: Primary | ICD-10-CM

## 2023-10-19 DIAGNOSIS — E66.01 CLASS 3 SEVERE OBESITY DUE TO EXCESS CALORIES WITH SERIOUS COMORBIDITY AND BODY MASS INDEX (BMI) OF 40.0 TO 44.9 IN ADULT (MULTI): ICD-10-CM

## 2023-10-19 DIAGNOSIS — I21.3 ST ELEVATION MYOCARDIAL INFARCTION (STEMI), UNSPECIFIED ARTERY (MULTI): ICD-10-CM

## 2023-10-19 DIAGNOSIS — Z95.5 STENTED CORONARY ARTERY: ICD-10-CM

## 2023-10-19 DIAGNOSIS — I25.110 CORONARY ARTERY DISEASE INVOLVING NATIVE CORONARY ARTERY OF NATIVE HEART WITH UNSTABLE ANGINA PECTORIS (MULTI): ICD-10-CM

## 2023-10-19 DIAGNOSIS — R03.0 ELEVATED BLOOD PRESSURE READING WITHOUT DIAGNOSIS OF HYPERTENSION: ICD-10-CM

## 2023-10-19 DIAGNOSIS — E11.69 TYPE 2 DIABETES MELLITUS WITH OTHER SPECIFIED COMPLICATION, UNSPECIFIED WHETHER LONG TERM INSULIN USE (MULTI): ICD-10-CM

## 2023-10-19 DIAGNOSIS — E11.9 CONTROLLED TYPE 2 DIABETES MELLITUS WITHOUT COMPLICATION, UNSPECIFIED WHETHER LONG TERM INSULIN USE (MULTI): ICD-10-CM

## 2023-10-19 PROCEDURE — 99214 OFFICE O/P EST MOD 30 MIN: CPT | Performed by: SURGERY

## 2023-10-19 PROCEDURE — 4010F ACE/ARB THERAPY RXD/TAKEN: CPT | Performed by: SURGERY

## 2023-10-19 PROCEDURE — 3075F SYST BP GE 130 - 139MM HG: CPT | Performed by: SURGERY

## 2023-10-19 PROCEDURE — 3008F BODY MASS INDEX DOCD: CPT | Performed by: SURGERY

## 2023-10-19 PROCEDURE — 3044F HG A1C LEVEL LT 7.0%: CPT | Performed by: SURGERY

## 2023-10-19 PROCEDURE — 1036F TOBACCO NON-USER: CPT | Performed by: SURGERY

## 2023-10-19 PROCEDURE — 3079F DIAST BP 80-89 MM HG: CPT | Performed by: SURGERY

## 2023-10-19 PROCEDURE — 99204 OFFICE O/P NEW MOD 45 MIN: CPT | Performed by: SURGERY

## 2023-11-14 ENCOUNTER — APPOINTMENT (OUTPATIENT)
Dept: SLEEP MEDICINE | Facility: CLINIC | Age: 48
End: 2023-11-14
Payer: COMMERCIAL

## 2023-11-28 ENCOUNTER — NUTRITION (OUTPATIENT)
Dept: SURGERY | Facility: CLINIC | Age: 48
End: 2023-11-28
Payer: COMMERCIAL

## 2023-11-28 VITALS — BODY MASS INDEX: 43.71 KG/M2 | HEIGHT: 64 IN | WEIGHT: 256 LBS

## 2023-11-28 DIAGNOSIS — E66.01 CLASS 3 SEVERE OBESITY DUE TO EXCESS CALORIES WITH SERIOUS COMORBIDITY AND BODY MASS INDEX (BMI) OF 40.0 TO 44.9 IN ADULT (MULTI): Primary | ICD-10-CM

## 2023-11-28 NOTE — PROGRESS NOTES
"PREOPERATIVE, MULTIDISCIPLINARY, MEDICALLY SUPERVISED, REDUCED CALORIE DIET, BEHAVIOR MODIFICATION AND EXERCISE PROGRAM    S:  Patient attended class today    O:     Vitals:    11/28/23 1827   Weight: 116 kg (256 lb)    Ht:   1.626 m (5' 4\")     BMI: Body mass index is 43.94 kg/m².    Goal: 5% body weight loss over the course of program    Dietary recommendation:   1. Eliminate high calorie, carbonated, and caffeinated beverages  2. Practice the 30-30-30 rule by drinking between meals.  3. Structure your meal plan - have 3 meals and 1 snack daily.  4. Have balanced meals that always contain a good source of protein.  5. Increase intake of non-starchy vegetables.  Have 5 servings fruits and vegetables daily.   6. Take a multivitamin daily.  7. Increase physical activity by 10-15 minutes to an end goal of 60 minutes 5 x per week.    Group Topic: Healthy Holiday Meal Planning    Behavioral recommendation: Patient is encouraged to choose healthy foods, along with ways to modify recipes as part of meal planning during the holiday season.     A/P: Pt appears to have a good understanding of how to incorporate health foods as part of holiday meal plans into her daily meal pattern.  Pt has a goal to consume Healthier choices as part of the holiday meal plans in their appropriate portion sizes.    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    Miriam Maya, ALBERT, LD  "

## 2023-11-30 ENCOUNTER — NURSE TRIAGE (OUTPATIENT)
Dept: SURGERY | Facility: CLINIC | Age: 48
End: 2023-11-30
Payer: COMMERCIAL

## 2023-11-30 NOTE — TELEPHONE ENCOUNTER
Patient calling asking if it is OK to proceed with EGD on Monday since she forgot to stop her Plavix.  She did not take it today.  Per Dr. Carrasco this is okay, and we can proceed.  Patient advised.

## 2023-12-04 ENCOUNTER — ANESTHESIA (OUTPATIENT)
Dept: GASTROENTEROLOGY | Facility: HOSPITAL | Age: 48
End: 2023-12-04
Payer: COMMERCIAL

## 2023-12-04 ENCOUNTER — ANESTHESIA EVENT (OUTPATIENT)
Dept: GASTROENTEROLOGY | Facility: HOSPITAL | Age: 48
End: 2023-12-04
Payer: COMMERCIAL

## 2023-12-04 ENCOUNTER — HOSPITAL ENCOUNTER (OUTPATIENT)
Dept: GASTROENTEROLOGY | Facility: HOSPITAL | Age: 48
Discharge: HOME | End: 2023-12-04
Payer: COMMERCIAL

## 2023-12-04 VITALS
HEIGHT: 64 IN | WEIGHT: 255.73 LBS | HEART RATE: 68 BPM | BODY MASS INDEX: 43.66 KG/M2 | SYSTOLIC BLOOD PRESSURE: 142 MMHG | TEMPERATURE: 97.7 F | DIASTOLIC BLOOD PRESSURE: 71 MMHG | OXYGEN SATURATION: 100 % | RESPIRATION RATE: 16 BRPM

## 2023-12-04 DIAGNOSIS — Z98.84 BARIATRIC SURGERY STATUS: Primary | ICD-10-CM

## 2023-12-04 LAB — PREGNANCY TEST URINE, POC: NEGATIVE

## 2023-12-04 PROCEDURE — 7100000009 HC PHASE TWO TIME - INITIAL BASE CHARGE: Performed by: SURGERY

## 2023-12-04 PROCEDURE — 43239 EGD BIOPSY SINGLE/MULTIPLE: CPT | Performed by: SURGERY

## 2023-12-04 PROCEDURE — 81025 URINE PREGNANCY TEST: CPT | Performed by: STUDENT IN AN ORGANIZED HEALTH CARE EDUCATION/TRAINING PROGRAM

## 2023-12-04 PROCEDURE — 0760T DGTZ GLS MCRSCP SL IMM 1ST: CPT | Mod: TC,SUR,PARLAB | Performed by: SURGERY

## 2023-12-04 PROCEDURE — 88305 TISSUE EXAM BY PATHOLOGIST: CPT | Performed by: PATHOLOGY

## 2023-12-04 PROCEDURE — 3700000002 HC GENERAL ANESTHESIA TIME - EACH INCREMENTAL 1 MINUTE: Performed by: SURGERY

## 2023-12-04 PROCEDURE — 0753T DGTZ GLS MCRSCP SLD LEVEL IV: CPT | Mod: TC,SUR,PARLAB | Performed by: SURGERY

## 2023-12-04 PROCEDURE — 7100000010 HC PHASE TWO TIME - EACH INCREMENTAL 1 MINUTE: Performed by: SURGERY

## 2023-12-04 PROCEDURE — A43239 PR EDG TRANSORAL BIOPSY SINGLE/MULTIPLE

## 2023-12-04 PROCEDURE — 2500000004 HC RX 250 GENERAL PHARMACY W/ HCPCS (ALT 636 FOR OP/ED)

## 2023-12-04 PROCEDURE — A43239 PR EDG TRANSORAL BIOPSY SINGLE/MULTIPLE: Performed by: ANESTHESIOLOGY

## 2023-12-04 PROCEDURE — 3700000001 HC GENERAL ANESTHESIA TIME - INITIAL BASE CHARGE: Performed by: SURGERY

## 2023-12-04 PROCEDURE — 2500000005 HC RX 250 GENERAL PHARMACY W/O HCPCS

## 2023-12-04 PROCEDURE — 88342 IMHCHEM/IMCYTCHM 1ST ANTB: CPT | Mod: TC,SUR,PARLAB | Performed by: SURGERY

## 2023-12-04 PROCEDURE — 94760 N-INVAS EAR/PLS OXIMETRY 1: CPT

## 2023-12-04 RX ORDER — PROPOFOL 10 MG/ML
INJECTION, EMULSION INTRAVENOUS CONTINUOUS PRN
Status: DISCONTINUED | OUTPATIENT
Start: 2023-12-04 | End: 2023-12-04

## 2023-12-04 RX ORDER — PROPOFOL 10 MG/ML
INJECTION, EMULSION INTRAVENOUS AS NEEDED
Status: DISCONTINUED | OUTPATIENT
Start: 2023-12-04 | End: 2023-12-04

## 2023-12-04 RX ORDER — SODIUM CHLORIDE, SODIUM LACTATE, POTASSIUM CHLORIDE, CALCIUM CHLORIDE 600; 310; 30; 20 MG/100ML; MG/100ML; MG/100ML; MG/100ML
20 INJECTION, SOLUTION INTRAVENOUS CONTINUOUS
Status: DISCONTINUED | OUTPATIENT
Start: 2023-12-04 | End: 2023-12-05 | Stop reason: HOSPADM

## 2023-12-04 RX ORDER — LIDOCAINE HCL/PF 100 MG/5ML
SYRINGE (ML) INTRAVENOUS AS NEEDED
Status: DISCONTINUED | OUTPATIENT
Start: 2023-12-04 | End: 2023-12-04

## 2023-12-04 RX ADMIN — PROPOFOL 140 MCG/KG/MIN: 10 INJECTION, EMULSION INTRAVENOUS at 07:37

## 2023-12-04 RX ADMIN — LIDOCAINE HYDROCHLORIDE 100 MG: 20 INJECTION INTRAVENOUS at 07:37

## 2023-12-04 RX ADMIN — PROPOFOL 50 MG: 10 INJECTION, EMULSION INTRAVENOUS at 07:41

## 2023-12-04 RX ADMIN — SODIUM CHLORIDE, POTASSIUM CHLORIDE, SODIUM LACTATE AND CALCIUM CHLORIDE: 600; 310; 30; 20 INJECTION, SOLUTION INTRAVENOUS at 07:32

## 2023-12-04 RX ADMIN — PROPOFOL 90 MG: 10 INJECTION, EMULSION INTRAVENOUS at 07:37

## 2023-12-04 RX ADMIN — PROPOFOL 80 MG: 10 INJECTION, EMULSION INTRAVENOUS at 07:45

## 2023-12-04 SDOH — HEALTH STABILITY: MENTAL HEALTH: CURRENT SMOKER: 0

## 2023-12-04 ASSESSMENT — ENCOUNTER SYMPTOMS
RHINORRHEA: 0
DIFFICULTY URINATING: 0
CHEST TIGHTNESS: 0
VOMITING: 0
FACIAL SWELLING: 0
APNEA: 0
CHOKING: 0
POLYDIPSIA: 0
FEVER: 0
PALPITATIONS: 0
NUMBNESS: 0
COLOR CHANGE: 0
FLANK PAIN: 0
LIGHT-HEADEDNESS: 0
CONFUSION: 0
NAUSEA: 0
TREMORS: 0
SINUS PAIN: 0
POLYPHAGIA: 0
HALLUCINATIONS: 0
AGITATION: 0
CHILLS: 0

## 2023-12-04 ASSESSMENT — PAIN SCALES - GENERAL
PAINLEVEL_OUTOF10: 0 - NO PAIN
PAIN_LEVEL: 0

## 2023-12-04 ASSESSMENT — PAIN - FUNCTIONAL ASSESSMENT: PAIN_FUNCTIONAL_ASSESSMENT: 0-10

## 2023-12-04 NOTE — H&P
History Of Present Illness    This is a 47 y.o. female with morbid obesity (Body mass index is 44.03 kg/m².) who presents to clinic for consideration of bariatric surgery. she has attempted and failed multiple diet and exercise regimens for weight loss. Initial Onset of obesity was during adolescence and after pregnancy.  Their goal for surgery is to  be healthier  and lose weight. The patient has tried multiple diets to lose weight including Weight Watchers, Low Calorie, and keto . The patient was most successful with the low calorie diet.  The patient considers their dietary weakness to be  carbs, emotional eating, late night snacking  The patient reports a  highest weight ever of 312 pounds and lowest weight ever of 140 pounds Distribution of Obesity: is central. Current diet:  1500 Calories Per Day. Compliance: General Adherence Diet Problems: The patient exercises daily  15 Minutes/Day Types of Exercise : walking     Comorbidities: esophageal reflux, heart disease, high cholesterol, diabetes managed by oral medication , and hypertension controlled with oral meds     Gastric ByPass     2 = Symptoms noticeable and bothersome but not every day     Past Medical History  Past Medical History:   Diagnosis Date    Coronary artery disease     Diabetes mellitus type 2 in obese (CMS/[facility])     Essential hypertension, benign     HLD (hyperlipidemia)     Obesity     STEMI (ST elevation myocardial infarction) (CMS/[facility])        Surgical History  Past Surgical History:   Procedure Laterality Date    CORONARY STENT PLACEMENT  2019    on plavix    OTHER SURGICAL HISTORY  2022    Tonsillectomy    OTHER SURGICAL HISTORY  2022     section        Social History  She reports that she quit smoking about 19 years ago. Her smoking use included cigarettes. She has never used smokeless tobacco. She reports current alcohol use. She reports current drug use. Drug: Marijuana.    Family History  Family History   Problem  Relation Name Age of Onset    Other (cva [Other] Depression, Bilpoar) Mother      Coronary artery disease Mother      No Known Problems Father      No Known Problems Sister      Other (Diabetes,HTN) Brother      No Known Problems Son      No Known Problems Maternal Grandmother      Other (Cardiac) Maternal Grandfather      No Known Problems Paternal Grandmother      No Known Problems Paternal Grandfather          Allergies  Bee venom protein (honey bee), Codeine, and Montelukast    Review of Systems   Constitutional:  Negative for chills and fever.   HENT:  Negative for facial swelling, nosebleeds, rhinorrhea and sinus pain.    Respiratory:  Negative for apnea, choking and chest tightness.    Cardiovascular:  Negative for chest pain, palpitations and leg swelling.   Gastrointestinal:  Negative for nausea and vomiting.   Endocrine: Negative for polydipsia, polyphagia and polyuria.   Genitourinary:  Negative for difficulty urinating, flank pain and urgency.   Skin:  Negative for color change, pallor and rash.   Neurological:  Negative for tremors, light-headedness and numbness.   Psychiatric/Behavioral:  Negative for agitation, behavioral problems, confusion, hallucinations and self-injury.         Physical Exam  Vitals reviewed.   Constitutional:       General: She is not in acute distress.     Appearance: She is not toxic-appearing.   Eyes:      Conjunctiva/sclera: Conjunctivae normal.   Cardiovascular:      Pulses: Normal pulses.   Pulmonary:      Effort: Pulmonary effort is normal. No respiratory distress.   Chest:      Chest wall: No tenderness.   Abdominal:      General: Abdomen is flat. There is no distension.      Palpations: Abdomen is soft.      Tenderness: There is no abdominal tenderness.   Musculoskeletal:         General: No swelling or deformity. Normal range of motion.      Cervical back: Normal range of motion and neck supple.   Skin:     General: Skin is warm and dry.   Neurological:      General: No  "focal deficit present.      Mental Status: Mental status is at baseline.   Psychiatric:         Mood and Affect: Mood normal.         Judgment: Judgment normal.          Last Recorded Vitals  Blood pressure 157/70, pulse 75, temperature 36 °C (96.8 °F), temperature source Temporal, resp. rate 16, height 1.626 m (5' 4\"), weight 116 kg (255 lb 11.7 oz), last menstrual period 11/13/2023, SpO2 97 %.    Relevant Results          Assessment/Plan   Active Problems:  There are no active Hospital Problems.      EGD for reflux to r/o esophagitis, gastritis, ulcers and hiatal hernias          I spent 25 minutes in the professional and overall care of this patient.      Nicolas Simpson MD    "

## 2023-12-04 NOTE — ANESTHESIA POSTPROCEDURE EVALUATION
Patient: Patricia Gibbs    Procedure Summary       Date: 12/04/23 Room / Location: [facility]    Anesthesia Start: 0732 Anesthesia Stop:     Procedure: EGD Diagnosis:       Bariatric surgery status      Bariatric surgery status    Scheduled Providers: Marcy Carrasco MD; Vikas Ferrera MD Responsible Provider: Vikas Ferrera MD    Anesthesia Type: MAC ASA Status: 3            Anesthesia Type: MAC    Vitals Value Taken Time   /59 12/04/23 0752   Temp 36.5 °C (97.7 °F) 12/04/23 0752   Pulse 78 12/04/23 0752   Resp 14 12/04/23 0752   SpO2 97 % 12/04/23 0752       Anesthesia Post Evaluation    Patient location during evaluation: PACU  Patient participation: complete - patient participated  Level of consciousness: awake and alert  Pain score: 0  Pain management: adequate  Airway patency: patent  Cardiovascular status: acceptable  Respiratory status: acceptable  Hydration status: acceptable  Postoperative Nausea and Vomiting: none    No notable events documented.

## 2023-12-04 NOTE — ANESTHESIA PREPROCEDURE EVALUATION
Patient: Patricia Gibbs    Procedure Information       Date/Time: 12/04/23 0730    Scheduled providers: Marcy Carrasco MD; Vikas Ferrera MD    Procedure: EGD    Location: [facility]            Relevant Problems   Cardiovascular   (+) Coronary artery disease involving native coronary artery of native heart with unstable angina pectoris (CMS/HCC)   (+) Hypertension   (+) Mixed hyperlipidemia   (+) STEMI (ST elevation myocardial infarction) (CMS/HCC)   (+) Stented coronary artery      Endocrine   (+) Controlled type 2 diabetes mellitus without complication (CMS/[facility])   (+) Obesity      Infectious Disease   (+) Vaginal yeast infection       Clinical information reviewed:   Tobacco  Allergies  Meds   Med Hx  Surg Hx  OB Status  Fam Hx  Soc   Hx        NPO Detail:  NPO/Void Status  Date of Last Liquid: 12/03/23  Time of Last Liquid: 1930  Date of Last Solid: 12/03/23  Time of Last Solid: 1930         Physical Exam    Airway  Mallampati: III  TM distance: >3 FB  Neck ROM: full     Cardiovascular   Rhythm: regular  Rate: normal     Dental - normal exam     Pulmonary   Breath sounds clear to auscultation     Abdominal        Anesthesia Plan    ASA 3     MAC     The patient is not a current smoker.  Patient was not previously instructed to abstain from smoking on day of procedure.  Patient did not smoke on day of procedure.    intravenous induction   Postoperative administration of opioids is intended.  Anesthetic plan and risks discussed with patient.  Use of blood products discussed with patient who consented to blood products.    Plan discussed with CRNA.

## 2023-12-11 ENCOUNTER — LAB (OUTPATIENT)
Dept: LAB | Facility: LAB | Age: 48
End: 2023-12-11
Payer: COMMERCIAL

## 2023-12-11 DIAGNOSIS — Z98.84 BARIATRIC SURGERY STATUS: ICD-10-CM

## 2023-12-11 LAB
ALBUMIN SERPL BCP-MCNC: 4.4 G/DL (ref 3.4–5)
ALP SERPL-CCNC: 62 U/L (ref 33–110)
ALT SERPL W P-5'-P-CCNC: 12 U/L (ref 7–45)
AMPHETAMINES UR QL SCN: ABNORMAL
ANION GAP SERPL CALC-SCNC: 12 MMOL/L (ref 10–20)
APTT PPP: 33 SECONDS (ref 27–38)
AST SERPL W P-5'-P-CCNC: 17 U/L (ref 9–39)
BARBITURATES UR QL SCN: ABNORMAL
BASOPHILS # BLD AUTO: 0.05 X10*3/UL (ref 0–0.1)
BASOPHILS NFR BLD AUTO: 0.6 %
BENZODIAZ UR QL SCN: ABNORMAL
BILIRUB SERPL-MCNC: 0.4 MG/DL (ref 0–1.2)
BUN SERPL-MCNC: 23 MG/DL (ref 6–23)
BZE UR QL SCN: ABNORMAL
CALCIUM SERPL-MCNC: 9.4 MG/DL (ref 8.6–10.3)
CANNABINOIDS UR QL SCN: ABNORMAL
CHLORIDE SERPL-SCNC: 103 MMOL/L (ref 98–107)
CO2 SERPL-SCNC: 25 MMOL/L (ref 21–32)
CREAT SERPL-MCNC: 1.12 MG/DL (ref 0.5–1.05)
CRP SERPL-MCNC: 0.35 MG/DL
EOSINOPHIL # BLD AUTO: 0.13 X10*3/UL (ref 0–0.7)
EOSINOPHIL NFR BLD AUTO: 1.5 %
ERYTHROCYTE [DISTWIDTH] IN BLOOD BY AUTOMATED COUNT: 14.6 % (ref 11.5–14.5)
FENTANYL+NORFENTANYL UR QL SCN: ABNORMAL
GFR SERPL CREATININE-BSD FRML MDRD: 61 ML/MIN/1.73M*2
GLUCOSE SERPL-MCNC: 99 MG/DL (ref 74–99)
HCT VFR BLD AUTO: 42.3 % (ref 36–46)
HGB BLD-MCNC: 13 G/DL (ref 12–16)
IMM GRANULOCYTES # BLD AUTO: 0.03 X10*3/UL (ref 0–0.7)
IMM GRANULOCYTES NFR BLD AUTO: 0.3 % (ref 0–0.9)
INR PPP: 0.9 (ref 0.9–1.1)
IRON SATN MFR SERPL: 10 % (ref 25–45)
IRON SERPL-MCNC: 41 UG/DL (ref 35–150)
LYMPHOCYTES # BLD AUTO: 2.93 X10*3/UL (ref 1.2–4.8)
LYMPHOCYTES NFR BLD AUTO: 33.2 %
MCH RBC QN AUTO: 25.5 PG (ref 26–34)
MCHC RBC AUTO-ENTMCNC: 30.7 G/DL (ref 32–36)
MCV RBC AUTO: 83 FL (ref 80–100)
MONOCYTES # BLD AUTO: 0.41 X10*3/UL (ref 0.1–1)
MONOCYTES NFR BLD AUTO: 4.6 %
NEUTROPHILS # BLD AUTO: 5.27 X10*3/UL (ref 1.2–7.7)
NEUTROPHILS NFR BLD AUTO: 59.8 %
NRBC BLD-RTO: 0 /100 WBCS (ref 0–0)
OPIATES UR QL SCN: ABNORMAL
OXYCODONE+OXYMORPHONE UR QL SCN: ABNORMAL
PCP UR QL SCN: ABNORMAL
PLATELET # BLD AUTO: 325 X10*3/UL (ref 150–450)
POTASSIUM SERPL-SCNC: 5.2 MMOL/L (ref 3.5–5.3)
PROT SERPL-MCNC: 7.2 G/DL (ref 6.4–8.2)
PROTHROMBIN TIME: 10.4 SECONDS (ref 9.8–12.8)
RBC # BLD AUTO: 5.09 X10*6/UL (ref 4–5.2)
SODIUM SERPL-SCNC: 135 MMOL/L (ref 136–145)
T4 FREE SERPL-MCNC: 0.93 NG/DL (ref 0.61–1.12)
TIBC SERPL-MCNC: 407 UG/DL (ref 240–445)
TSH SERPL-ACNC: 1.48 MIU/L (ref 0.44–3.98)
UIBC SERPL-MCNC: 366 UG/DL (ref 110–370)
WBC # BLD AUTO: 8.8 X10*3/UL (ref 4.4–11.3)

## 2023-12-11 PROCEDURE — 82607 VITAMIN B-12: CPT

## 2023-12-11 PROCEDURE — 83970 ASSAY OF PARATHORMONE: CPT

## 2023-12-11 PROCEDURE — 84425 ASSAY OF VITAMIN B-1: CPT

## 2023-12-11 PROCEDURE — 85610 PROTHROMBIN TIME: CPT

## 2023-12-11 PROCEDURE — 86140 C-REACTIVE PROTEIN: CPT

## 2023-12-11 PROCEDURE — 84443 ASSAY THYROID STIM HORMONE: CPT

## 2023-12-11 PROCEDURE — 80307 DRUG TEST PRSMV CHEM ANLYZR: CPT

## 2023-12-11 PROCEDURE — 80349 CANNABINOIDS NATURAL: CPT

## 2023-12-11 PROCEDURE — 84439 ASSAY OF FREE THYROXINE: CPT

## 2023-12-11 PROCEDURE — 82728 ASSAY OF FERRITIN: CPT

## 2023-12-11 PROCEDURE — 83550 IRON BINDING TEST: CPT

## 2023-12-11 PROCEDURE — 85025 COMPLETE CBC W/AUTO DIFF WBC: CPT

## 2023-12-11 PROCEDURE — 84630 ASSAY OF ZINC: CPT

## 2023-12-11 PROCEDURE — 85730 THROMBOPLASTIN TIME PARTIAL: CPT

## 2023-12-11 PROCEDURE — 80053 COMPREHEN METABOLIC PANEL: CPT

## 2023-12-11 PROCEDURE — 83540 ASSAY OF IRON: CPT

## 2023-12-11 PROCEDURE — 82525 ASSAY OF COPPER: CPT

## 2023-12-11 PROCEDURE — 80323 ALKALOIDS NOS: CPT

## 2023-12-11 PROCEDURE — 36415 COLL VENOUS BLD VENIPUNCTURE: CPT

## 2023-12-11 PROCEDURE — 82746 ASSAY OF FOLIC ACID SERUM: CPT

## 2023-12-12 DIAGNOSIS — R82.5 POSITIVE URINE DRUG SCREEN: ICD-10-CM

## 2023-12-12 DIAGNOSIS — Z01.818 PRE-OPERATIVE CLEARANCE: ICD-10-CM

## 2023-12-12 LAB
FERRITIN SERPL-MCNC: <8 NG/ML (ref 8–150)
FOLATE SERPL-MCNC: >24 NG/ML
PTH-INTACT SERPL-MCNC: 71.9 PG/ML (ref 18.5–88)
VIT B12 SERPL-MCNC: 422 PG/ML (ref 211–911)

## 2023-12-13 LAB
COPPER SERPL-MCNC: 145.3 UG/DL (ref 80–155)
ZINC SERPL-MCNC: 89.9 UG/DL (ref 60–120)

## 2023-12-14 LAB
COTININE SERPL-MCNC: <5 NG/ML
NICOTINE SERPL-MCNC: <5 NG/ML
VIT B1 PYROPHOSHATE BLD-SCNC: 127 NMOL/L (ref 70–180)

## 2023-12-15 LAB — CARBOXYTHC UR-MCNC: 29 NG/ML

## 2023-12-21 LAB
LAB AP ASR DISCLAIMER: NORMAL
LABORATORY COMMENT REPORT: NORMAL
PATH REPORT.FINAL DX SPEC: NORMAL
PATH REPORT.GROSS SPEC: NORMAL
PATH REPORT.RELEVANT HX SPEC: NORMAL
PATH REPORT.TOTAL CANCER: NORMAL

## 2023-12-21 PROCEDURE — 88342 IMHCHEM/IMCYTCHM 1ST ANTB: CPT | Performed by: PATHOLOGY

## 2023-12-29 ENCOUNTER — NUTRITION (OUTPATIENT)
Dept: SURGERY | Facility: CLINIC | Age: 48
End: 2023-12-29
Payer: COMMERCIAL

## 2023-12-29 VITALS — BODY MASS INDEX: 43.43 KG/M2 | WEIGHT: 253 LBS

## 2023-12-29 DIAGNOSIS — E66.01 CLASS 3 SEVERE OBESITY DUE TO EXCESS CALORIES WITH SERIOUS COMORBIDITY AND BODY MASS INDEX (BMI) OF 40.0 TO 44.9 IN ADULT (MULTI): ICD-10-CM

## 2023-12-29 DIAGNOSIS — Z98.84 BARIATRIC SURGERY STATUS: Primary | ICD-10-CM

## 2023-12-29 NOTE — PROGRESS NOTES
PREOPERATIVE, MULTIDISCIPLINARY, MEDICALLY SUPERVISED, REDUCED CALORIE DIET, BEHAVIOR MODIFICATION AND EXERCISE PROGRAM    S:  MSWL #3. Struggles with avoiding CO2 water drink 50 oz total (1.5 34oz bottle). Reduced coffee intake to 1-2c/day. Working on not taking large sips/gulps. Encouraged to try spout lipped cup/water bottle. Working on meal plan, still skipping one meal - lunch due to often not hungry. Tried some protein bars, did not like them. Trying to find a protein drink to tolerate. Taking MVI daily. Less exercise this past month due to hip pain. 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 Encouraged to get up and walk around for 1-2 minutes each hour. Encouraged to take breaks to do some hip mobility stretches.  Encouraged to contact PCP and request referral for PT if exercises are not helpful.      O:    Wt: 253 lbs   lbs   Ht:  64 in  BMI: 43.4    Goal: 5% body weight loss over the course of program    Dietary recommendation:   1. Keep trying different fluids to help decrease reliance on carbonated water.   2. Practice the 30-30-30 rule by drinking between meals.  3. Use a water bottle that has a spout opening to help you take smaller sips.   4. Eat 3 meals daily. Try meal planning to develop a weekly menu for what your meals will be each week. Set a reminder around lunch time to remind you to take a 20 minute break to eat.   5. Take a multivitamin daily.  6. Check out the list of YouTube videos to follow for some hip stretches. Aim for 5-10 minutes/day. Set a reminder to go off each hour to get up and walk around, avoid siting for extended periods of time.     Group Topic: Emotional Eating      A/P: Pt appears to have a good understanding of recommendations. Patient has a goal to continue to work on strategies to help her eat 3 meals/day, take smaller sips and drink more water. Patient has a goal to try hip stretches and get up each hour to help increase daily activity.     Exercise:  none    Chantelle JACKSON  ALBERT Milligan

## 2024-01-18 ENCOUNTER — DOCUMENTATION (OUTPATIENT)
Dept: SURGERY | Facility: CLINIC | Age: 49
End: 2024-01-18
Payer: COMMERCIAL

## 2024-01-22 ENCOUNTER — TELEPHONE (OUTPATIENT)
Dept: SURGERY | Facility: HOSPITAL | Age: 49
End: 2024-01-22
Payer: COMMERCIAL

## 2024-01-22 NOTE — TELEPHONE ENCOUNTER
Pt was [facility] for virtual session today at 1:30 PM. Called patient, no answer and LVM to return call.

## 2024-02-06 ENCOUNTER — TELEPHONE (OUTPATIENT)
Dept: PRIMARY CARE | Facility: CLINIC | Age: 49
End: 2024-02-06
Payer: COMMERCIAL

## 2024-02-23 ENCOUNTER — PATIENT MESSAGE (OUTPATIENT)
Dept: PRIMARY CARE | Facility: CLINIC | Age: 49
End: 2024-02-23
Payer: COMMERCIAL

## 2024-02-26 DIAGNOSIS — E11.9 TYPE 2 DIABETES MELLITUS WITHOUT COMPLICATION, WITHOUT LONG-TERM CURRENT USE OF INSULIN (MULTI): Primary | ICD-10-CM

## 2024-02-26 RX ORDER — METFORMIN HYDROCHLORIDE 500 MG/1
1000 TABLET, EXTENDED RELEASE ORAL 2 TIMES DAILY
Qty: 30 TABLET | Refills: 0 | Status: SHIPPED | OUTPATIENT
Start: 2024-02-26 | End: 2024-03-13 | Stop reason: SDUPTHER

## 2024-02-26 NOTE — TELEPHONE ENCOUNTER
----- Message from Patricia Gibbs sent at 2/26/2024  8:00 AM EST -----  Regarding: Refill Metformin   Contact: [phone]  Thank you for reaching out, I was not aware Gianna had left the office or would have made this happen sooner.  I am happy to make an appointment however I am out of this medication and it is something that is needed to control my diabetes.  Is it possible to at least have a small amount called in to get me through till I can establish care.    I currently work in [employer] I am [facility] on Thursday and Friday so I will try and and get an appointment scheduled asap.      Thank you

## 2024-02-28 ENCOUNTER — APPOINTMENT (OUTPATIENT)
Dept: PRIMARY CARE | Facility: CLINIC | Age: 49
End: 2024-02-28
Payer: COMMERCIAL

## 2024-02-28 ENCOUNTER — DOCUMENTATION (OUTPATIENT)
Dept: SURGERY | Facility: CLINIC | Age: 49
End: 2024-02-28

## 2024-03-06 ENCOUNTER — TELEPHONE (OUTPATIENT)
Dept: PRIMARY CARE | Facility: CLINIC | Age: 49
End: 2024-03-06
Payer: COMMERCIAL

## 2024-03-06 NOTE — TELEPHONE ENCOUNTER
----- Message from Brittani Krishnan sent at 3/6/2024 11:50 AM EST -----  Regarding: FW: Follow up on prescription called in.  Contact: [phone]    ----- Message -----  From: Patricia Gibbs  Sent: 3/6/2024  11:36 AM EST  To: #  Subject: Follow up on prescription called in.             Follow up on prescription called in.  Hello  I am reaching out yet again as I have not received a response to my previous message or a returned phone call.   Please see the below previous message:  You  Feb 28, 11:23 AM  Good morning,  I wanted to reach out as I received a phone call from someone at the office stating you would supply me with a 30 day supply of metformin as long as I established a PCP.  I have scheduled an apt. with Dr. Villareal however not able to get an apt. until 3/13.    I picked up the prescription yesterday from [facility] and it was NOT a 30 day supply the quantity  was only 30 pills.  I called [facility] to confirm the prescription was not filled wrong and they informed me the amount listed was indeed only 30 pills.  This will not get me through 30 days nor will it last till I can see the doc.  as it is only a weeks worth at 4 pills a day.      Can you please call in the full 30 day supply?       Thank you!    Please note I am now out of the medication and don't see the doc till 3/13

## 2024-03-06 NOTE — TELEPHONE ENCOUNTER
----- Message from Brittani Krishnan sent at 3/6/2024 11:50 AM EST -----  Regarding: FW: Follow up on prescription called in.  Contact: [phone]    ----- Message -----  From: Patricia Gibbs  Sent: 3/6/2024  11:36 AM EST  To: #  Subject: Follow up on prescription called in.             Follow up on prescription called in.  Hello  I am reaching out yet again as I have not received a response to my previous message or a returned phone call.   Please see the below previous message:  You  Feb 28, 11:23 AM  Good morning,  I wanted to reach out as I received a phone call from someone at the office stating you would supply me with a 30 day supply of metformin as long as I established a PCP.  I have scheduled an apt. with Dr. Villareal however not able to get an apt. until 3/13.    I picked up the prescription yesterday from [facility] and it was NOT a 30 day supply the quantity  was only 30 pills.  I called [facility] to confirm the prescription was not filled wrong and they informed me the amount listed was indeed only 30 pills.  This will not get me through 30 days nor will it last till I can see the doc.  as it is only a weeks worth at 4 pills a day.      Can you please call in the full 30 day supply?       Thank you!    Please note I am now out of the medication and don't see the doc till 3/13

## 2024-03-13 ENCOUNTER — OFFICE VISIT (OUTPATIENT)
Dept: PRIMARY CARE | Facility: CLINIC | Age: 49
End: 2024-03-13
Payer: COMMERCIAL

## 2024-03-13 VITALS
WEIGHT: 264.8 LBS | BODY MASS INDEX: 45.21 KG/M2 | TEMPERATURE: 98.4 F | HEART RATE: 92 BPM | HEIGHT: 64 IN | SYSTOLIC BLOOD PRESSURE: 103 MMHG | DIASTOLIC BLOOD PRESSURE: 82 MMHG | OXYGEN SATURATION: 98 % | RESPIRATION RATE: 16 BRPM

## 2024-03-13 DIAGNOSIS — E11.9 TYPE 2 DIABETES MELLITUS WITHOUT COMPLICATION, WITHOUT LONG-TERM CURRENT USE OF INSULIN (MULTI): Primary | ICD-10-CM

## 2024-03-13 DIAGNOSIS — Z76.89 ENCOUNTER TO ESTABLISH CARE: ICD-10-CM

## 2024-03-13 DIAGNOSIS — Z95.5 STENTED CORONARY ARTERY: ICD-10-CM

## 2024-03-13 DIAGNOSIS — E66.01 MORBID OBESITY WITH BMI OF 45.0-49.9, ADULT (MULTI): ICD-10-CM

## 2024-03-13 DIAGNOSIS — I10 HYPERTENSION, UNSPECIFIED TYPE: ICD-10-CM

## 2024-03-13 DIAGNOSIS — I25.2 HISTORY OF HEART ATTACK: ICD-10-CM

## 2024-03-13 DIAGNOSIS — S59.901A INJURY OF RIGHT ELBOW, INITIAL ENCOUNTER: ICD-10-CM

## 2024-03-13 PROBLEM — B37.31 VAGINAL YEAST INFECTION: Status: RESOLVED | Noted: 2023-10-18 | Resolved: 2024-03-13

## 2024-03-13 PROBLEM — R31.9 HEMATURIA: Status: RESOLVED | Noted: 2023-10-18 | Resolved: 2024-03-13

## 2024-03-13 PROBLEM — R39.9 LOWER URINARY TRACT SYMPTOMS (LUTS): Status: RESOLVED | Noted: 2023-10-18 | Resolved: 2024-03-13

## 2024-03-13 LAB — POC HEMOGLOBIN A1C: 7.3 % (ref 4.2–6.5)

## 2024-03-13 PROCEDURE — 3079F DIAST BP 80-89 MM HG: CPT | Performed by: NURSE PRACTITIONER

## 2024-03-13 PROCEDURE — 3008F BODY MASS INDEX DOCD: CPT | Performed by: NURSE PRACTITIONER

## 2024-03-13 PROCEDURE — 83036 HEMOGLOBIN GLYCOSYLATED A1C: CPT | Performed by: NURSE PRACTITIONER

## 2024-03-13 PROCEDURE — 1036F TOBACCO NON-USER: CPT | Performed by: NURSE PRACTITIONER

## 2024-03-13 PROCEDURE — 4010F ACE/ARB THERAPY RXD/TAKEN: CPT | Performed by: NURSE PRACTITIONER

## 2024-03-13 PROCEDURE — 3074F SYST BP LT 130 MM HG: CPT | Performed by: NURSE PRACTITIONER

## 2024-03-13 PROCEDURE — 99214 OFFICE O/P EST MOD 30 MIN: CPT | Performed by: NURSE PRACTITIONER

## 2024-03-13 RX ORDER — METFORMIN HYDROCHLORIDE 500 MG/1
1000 TABLET, EXTENDED RELEASE ORAL 2 TIMES DAILY
Qty: 120 TABLET | Refills: 3 | Status: SHIPPED | OUTPATIENT
Start: 2024-03-13 | End: 2025-03-13

## 2024-03-13 ASSESSMENT — ENCOUNTER SYMPTOMS
POLYDIPSIA: 0
FEVER: 0
BRUISES/BLEEDS EASILY: 0
SHORTNESS OF BREATH: 0
DIZZINESS: 0
WEAKNESS: 0
STRIDOR: 0
CHILLS: 0
CHEST TIGHTNESS: 0
NAUSEA: 0
LIGHT-HEADEDNESS: 0
JOINT SWELLING: 0
COUGH: 0
PALPITATIONS: 0
CHOKING: 0
ACTIVITY CHANGE: 0
UNEXPECTED WEIGHT CHANGE: 0
APPETITE CHANGE: 0
POLYPHAGIA: 0
HEADACHES: 0
FLANK PAIN: 0
ADENOPATHY: 0
FATIGUE: 0
ABDOMINAL PAIN: 0
DIAPHORESIS: 0
APNEA: 0
WHEEZING: 0
DIARRHEA: 0
DIFFICULTY URINATING: 0
VOMITING: 0

## 2024-03-13 NOTE — PROGRESS NOTES
"Subjective   Patient ID: Patricia Gibbs is a 48 y.o. female who presents for Establish Care, Hypertension, and Diabetes.     HPI  Patient in office to establish care. Hx of DM 2 (A1C: 7.3% today) and HTN (controlled). She has a hx of myocardial infarction (2019) and follows up with cardiology. Works as a . She does not drink, smoke, or use drugs. Hx of morbid obesity. She is being re-evaluated for bariatric surgery after switching insurances. The patient injured her right elbow 1 month ago lifting luggage (will Xray); complains of pain with flexion an extension. She agrees to scheduling a physical and fasting blood work in 1 month.    Review of Systems   Constitutional:  Negative for activity change, appetite change, chills, diaphoresis, fatigue, fever and unexpected weight change.   Respiratory:  Negative for apnea, cough, choking, chest tightness, shortness of breath, wheezing and stridor.    Cardiovascular:  Negative for chest pain, palpitations and leg swelling.   Gastrointestinal:  Negative for abdominal pain, diarrhea, nausea and vomiting.   Endocrine: Negative for cold intolerance, heat intolerance, polydipsia, polyphagia and polyuria.   Genitourinary:  Negative for decreased urine volume, difficulty urinating, flank pain, pelvic pain and urgency.   Musculoskeletal:  Negative for gait problem and joint swelling.        Right elbow pain   Skin:  Negative for pallor and rash.   Neurological:  Negative for dizziness, syncope, weakness, light-headedness and headaches.   Hematological:  Negative for adenopathy. Does not bruise/bleed easily.       Objective   /82   Pulse 92   Temp 36.9 °C (98.4 °F) (Temporal)   Resp 16   Ht 1.626 m (5' 4\")   Wt 120 kg (264 lb 12.8 oz)   SpO2 98%   BMI 45.45 kg/m²     Physical Exam  Constitutional:       Appearance: Normal appearance. She is obese.   HENT:      Head: Normocephalic.   Eyes:      Conjunctiva/sclera: Conjunctivae normal.   Cardiovascular:      " Rate and Rhythm: Normal rate and regular rhythm.      Pulses: Normal pulses.      Heart sounds: Normal heart sounds.   Pulmonary:      Effort: Pulmonary effort is normal.      Breath sounds: Normal breath sounds.   Musculoskeletal:         General: Tenderness present. No swelling.      Comments: Extension, flexion, and rotation of right elbow slightly diminished; mild to moderate tenderness to palpation in right lateral epicondyle area   Skin:     General: Skin is warm.      Coloration: Skin is not jaundiced or pale.   Neurological:      General: No focal deficit present.      Mental Status: She is alert.      Sensory: No sensory deficit.      Motor: No weakness.      Gait: Gait normal.   Psychiatric:         Mood and Affect: Mood normal.       Assessment/Plan     Exam findings reviewed with patient. Medications  reviewed. Patient may use OTC Tylenol for pain. Advocated RICE for right elbow pain. Activity as tolerated. We will call with image results and update the patient on the plan of care. The patient will keep monitoring blood pressures and blood sugars at home; she will call the office with outliers or abnormal trends. Benefits of healthy diet and exercise reviewed. Follow up in 1 month for a physical and fasting blood work or earlier as needed.

## 2024-03-18 DIAGNOSIS — E11.9 TYPE 2 DIABETES MELLITUS WITHOUT COMPLICATION, WITHOUT LONG-TERM CURRENT USE OF INSULIN (MULTI): ICD-10-CM

## 2024-03-19 ENCOUNTER — TELEPHONE (OUTPATIENT)
Dept: PRIMARY CARE | Facility: CLINIC | Age: 49
End: 2024-03-19
Payer: COMMERCIAL

## 2024-03-19 DIAGNOSIS — E11.9 TYPE 2 DIABETES MELLITUS WITHOUT COMPLICATION, WITHOUT LONG-TERM CURRENT USE OF INSULIN (MULTI): ICD-10-CM

## 2024-03-19 RX ORDER — SEMAGLUTIDE 0.68 MG/ML
0.5 INJECTION, SOLUTION SUBCUTANEOUS
Refills: 1 | OUTPATIENT
Start: 2024-03-19

## 2024-03-19 RX ORDER — SEMAGLUTIDE 1.34 MG/ML
0.5 INJECTION, SOLUTION SUBCUTANEOUS
Qty: 1 EACH | Refills: 5 | Status: SHIPPED | OUTPATIENT
Start: 2024-03-19

## 2024-03-26 ENCOUNTER — DOCUMENTATION (OUTPATIENT)
Dept: SURGERY | Facility: CLINIC | Age: 49
End: 2024-03-26
Payer: COMMERCIAL

## 2024-03-26 ENCOUNTER — TELEPHONE (OUTPATIENT)
Dept: SURGERY | Facility: CLINIC | Age: 49
End: 2024-03-26
Payer: COMMERCIAL

## 2024-03-26 NOTE — TELEPHONE ENCOUNTER
Spoke with patient - she is still interested in bariatric surgery.  She is aware that she will need to start over with [facility] as her insurance does require 6 consecutive months. I will send the patient an updated list of clearances via [employer].

## 2024-04-10 DIAGNOSIS — E11.9 TYPE 2 DIABETES MELLITUS WITHOUT COMPLICATION, WITHOUT LONG-TERM CURRENT USE OF INSULIN (MULTI): ICD-10-CM

## 2024-04-10 RX ORDER — SEMAGLUTIDE 0.68 MG/ML
0.5 INJECTION, SOLUTION SUBCUTANEOUS
Refills: 1 | OUTPATIENT
Start: 2024-04-14

## 2024-04-11 ENCOUNTER — NUTRITION (OUTPATIENT)
Dept: SURGERY | Facility: CLINIC | Age: 49
End: 2024-04-11
Payer: COMMERCIAL

## 2024-04-12 NOTE — PROGRESS NOTES
PREOPERATIVE, MULTIDISCIPLINARY, MEDICALLY SUPERVISED, REDUCED CALORIE DIET, BEHAVIOR MODIFICATION AND EXERCISE PROGRAM    S:      O:    Wt:  257.0      Ht:    64.0            BMI:     Goal: 5% body weight loss over the course of program    Dietary recommendation:   1. Eliminate high calorie, carbonated, and caffeinated beverages  2. Practice the 30-30-30 rule by drinking between meals.  3. Structure your meal plan - have 3 meals and 1 snack daily.  4. Have balanced meals that always contain a good source of protein.  5. Increase intake of non-starchy vegetables.  Have 5 servings fruits and vegetables daily.   6. Take a multivitamin daily.  7. Increase physical activity by 10-15 minutes to an end goal of 60 minutes 5 x per week.    Group Topic: Get Carolann'    Behavioral recommendation: Pt is encouraged to identify and try different types of physical activity.    A/P: Pt appears to have a good understanding of how to incorporate physical activity into their daily schedule.  Pt has a goal to begin making small time commitments each week to fit in 30 minutes of exercise on as many days as possible.    Exercise:  walking for 20 min 5x/week     Tamia Antonio RD

## 2024-04-17 ENCOUNTER — TELEPHONE (OUTPATIENT)
Dept: SURGERY | Facility: CLINIC | Age: 49
End: 2024-04-17
Payer: COMMERCIAL

## 2024-04-17 NOTE — TELEPHONE ENCOUNTER
Outbound call to patient to see if she is still interested in the bariatric surgery program.  Patient has missed several visits and has not scheduled most others. Patient has a 6 consecutive month of MS[facility] required which as been reviewed with the patient multiple times.  Patient will need to start this requirement again as she has missed several months in a row.  Asked patient to call [phone] to either get back on track or let us know she is no longer interested.

## 2024-04-25 ENCOUNTER — APPOINTMENT (OUTPATIENT)
Dept: PRIMARY CARE | Facility: CLINIC | Age: 49
End: 2024-04-25
Payer: COMMERCIAL

## 2024-05-02 ENCOUNTER — TELEPHONE (OUTPATIENT)
Dept: SURGERY | Facility: CLINIC | Age: 49
End: 2024-05-02
Payer: COMMERCIAL

## 2024-05-02 NOTE — TELEPHONE ENCOUNTER
Outgoing call made to patient. Introduced myself to patient as  Bariatric .  If patient has any questions/concerns please call. Left direct # [phone].    Also, told patient that I will be following up with a message through [employer].

## 2024-05-29 ENCOUNTER — NUTRITION (OUTPATIENT)
Dept: SURGERY | Facility: CLINIC | Age: 49
End: 2024-05-29
Payer: COMMERCIAL

## 2024-05-29 NOTE — PROGRESS NOTES
PREOPERATIVE, MULTIDISCIPLINARY, MEDICALLY SUPERVISED, REDUCED CALORIE DIET, BEHAVIOR MODIFICATION AND EXERCISE PROGRAM    S:  Patient attend the MS[facility] class     O:    Wt: #257      Ht:               BMI: BMI@    Goal: 5% body weight loss over the course of program    Dietary recommendation:   1. Eliminate high calorie, carbonated, and caffeinated beverages  2. Practice the 30-30-30 rule by drinking between meals.  3. Structure your meal plan - have 3 meals and 1 snack daily.  4. Have balanced meals that always contain a good source of protein.  5. Increase intake of non-starchy vegetables.  Have 5 servings fruits and vegetables daily.   6. Take a multivitamin daily.  7. Increase physical activity by 10-15 minutes to an end goal of 60 minutes 5 x per week.    Group Topic: Put Your Beverage to the Test  Behavioral recommendation: Pt is encouraged to identify appropriate beverages that can be tolerated post weight loss surgery.    A/P: Pt appears to have a good understanding of how to choose beverages that are appropriate for the weight loss surgery patient.  Pt has a goal to consume a minimum of 8 cups of sugar free, decaffeinated, non-carbonated beverages daily    Exercise: walking 3 times a week for 20 minutes     Sandhya Linares RD, LD

## 2024-06-05 ENCOUNTER — DOCUMENTATION (OUTPATIENT)
Dept: SURGERY | Facility: CLINIC | Age: 49
End: 2024-06-05
Payer: COMMERCIAL

## 2024-06-05 NOTE — PROGRESS NOTES
Update to the bariatric checklist and outbound call to patient.  Voicemail was left letting patient know that we are trying to get her back on track with clearances.  She restarted her 6 consecutive months of MS[facility] 5/29.  Reiterated she will need to attend monthly for 6 months.  Patient needs to be scheduled for a follow up with her dietitian Natasha Milligan as she was a [facility] to her last visit in January.  No other clearances scheduled at this time.  Provided her patient 's number to get back on track.

## 2024-06-11 ENCOUNTER — TELEPHONE (OUTPATIENT)
Dept: SURGERY | Facility: CLINIC | Age: 49
End: 2024-06-11
Payer: COMMERCIAL

## 2024-06-11 NOTE — TELEPHONE ENCOUNTER
Outgoing call made to patient. Patient is still interested.  She started a new job and it put her off track. Told her will get her back on. Told her someone will call her to [facility] appt with EDITH

## 2024-06-27 ENCOUNTER — NUTRITION (OUTPATIENT)
Dept: SURGERY | Facility: CLINIC | Age: 49
End: 2024-06-27
Payer: COMMERCIAL

## 2024-06-27 VITALS — WEIGHT: 257 LBS | BODY MASS INDEX: 43.87 KG/M2 | HEIGHT: 64 IN

## 2024-06-27 DIAGNOSIS — E11.9 TYPE 2 DIABETES MELLITUS WITHOUT COMPLICATION, WITHOUT LONG-TERM CURRENT USE OF INSULIN (MULTI): ICD-10-CM

## 2024-06-27 RX ORDER — METFORMIN HYDROCHLORIDE 500 MG/1
1000 TABLET, EXTENDED RELEASE ORAL 2 TIMES DAILY
Qty: 120 TABLET | Refills: 3 | Status: SHIPPED | OUTPATIENT
Start: 2024-06-27 | End: 2025-06-27

## 2024-06-27 RX ORDER — METFORMIN HYDROCHLORIDE 500 MG/1
1000 TABLET, EXTENDED RELEASE ORAL 2 TIMES DAILY
Qty: 120 TABLET | Refills: 0 | OUTPATIENT
Start: 2024-06-27 | End: 2025-06-27

## 2024-06-27 RX ORDER — METFORMIN HYDROCHLORIDE 500 MG/1
1000 TABLET, EXTENDED RELEASE ORAL 2 TIMES DAILY
Qty: 360 TABLET | Refills: 1 | OUTPATIENT
Start: 2024-06-27

## 2024-06-27 NOTE — TELEPHONE ENCOUNTER
Patricia scheduled an appointment for July 24, 2024.  Can you please send in a one month prescription to get her through to the appointment?  Thank you

## 2024-07-20 ENCOUNTER — OFFICE VISIT (OUTPATIENT)
Dept: URGENT CARE | Facility: CLINIC | Age: 49
End: 2024-07-20
Payer: COMMERCIAL

## 2024-07-20 VITALS
WEIGHT: 257 LBS | HEIGHT: 64 IN | RESPIRATION RATE: 20 BRPM | HEART RATE: 84 BPM | SYSTOLIC BLOOD PRESSURE: 139 MMHG | TEMPERATURE: 97.7 F | DIASTOLIC BLOOD PRESSURE: 88 MMHG | OXYGEN SATURATION: 98 % | BODY MASS INDEX: 43.87 KG/M2

## 2024-07-20 DIAGNOSIS — M70.22 OLECRANON BURSITIS OF LEFT ELBOW: Primary | ICD-10-CM

## 2024-07-20 PROCEDURE — 1036F TOBACCO NON-USER: CPT | Performed by: FAMILY MEDICINE

## 2024-07-20 PROCEDURE — 99203 OFFICE O/P NEW LOW 30 MIN: CPT | Performed by: FAMILY MEDICINE

## 2024-07-20 PROCEDURE — 3075F SYST BP GE 130 - 139MM HG: CPT | Performed by: FAMILY MEDICINE

## 2024-07-20 PROCEDURE — 3079F DIAST BP 80-89 MM HG: CPT | Performed by: FAMILY MEDICINE

## 2024-07-20 PROCEDURE — 4010F ACE/ARB THERAPY RXD/TAKEN: CPT | Performed by: FAMILY MEDICINE

## 2024-07-20 PROCEDURE — 3008F BODY MASS INDEX DOCD: CPT | Performed by: FAMILY MEDICINE

## 2024-07-20 RX ORDER — PREDNISONE 20 MG/1
TABLET ORAL
Qty: 20 TABLET | Refills: 0 | Status: SHIPPED | OUTPATIENT
Start: 2024-07-20

## 2024-07-20 ASSESSMENT — PAIN SCALES - GENERAL: PAINLEVEL: 5

## 2024-07-20 NOTE — PROGRESS NOTES
Subjective   Patient ID: Patricia Gibbs is a 48 y.o. female.    48-year old female presents with complaint of pain and swelling of the left elbow. Reports suspected insect bite to area about 2 weeks ago which resolved without difficulty. Now has obvious swelling over olecranon.          The following portions of the chart were reviewed this encounter and updated as appropriate:  Tobacco  Allergies  Meds  Problems  Med Hx  Surg Hx  Fam Hx         Review of Systems   Constitutional:  Negative for chills and fever.   HENT:  Negative for congestion, ear pain, rhinorrhea and sore throat.    Respiratory:  Negative for cough, chest tightness, shortness of breath and wheezing.    Cardiovascular:  Negative for palpitations.   Gastrointestinal:  Negative for abdominal pain, constipation, diarrhea, nausea and vomiting.   Genitourinary:  Negative for dysuria and frequency.   Musculoskeletal:  Positive for arthralgias and joint swelling.   Skin:  Positive for color change.   Neurological:  Negative for headaches.     Objective   Physical Exam  Vital signs are reviewed. Alert and oriented x3 with normal mood and affect  Patient is well nourished, well-developed, alert and in no acute distress    External eyes, orbits, conjunctiva and eyelids are normal in appearance  Pupils are equal, round, reactive to light and accommodation, extraocular movements intact    External ears appear normal  External canals are normal in appearance  Right tympanic membrane is intact and pale gray in appearance  Left tympanic membrane is intact and pale gray in appearance  There is no middle ear effusion noted on the right  There is no middle ear effusion noted on the left  External appearance of the nose is normal  Nasal mucosa, septum, turbinates are pink in appearance  There is no nasal discharge in both nares    Oral mucosa is uniformly pink and moist  Palate is pink, symmetric and intact  Tongue is moist, mobile and midline  Posterior  pharynx not erythematous with no concretions or exudates present  No cervical lymphadenopathy palpated    Heart has regular rate and rhythm. No murmurs, rubs or gallops are auscultated at this exam.    Respiratory rate rhythm and effort are normal. Breath sounds bilaterally are clear on auscultation without crackles, rhonchi, wheezes or friction rub.    Abdomen: Normal bowel sounds on auscultation. Soft, nontender without rebound or rigidity on palpation    Extremities: Left elbow: Patient is tender swollen olecranon bursa.  She has no tenderness or pain with pronation or supination of the forearm.  She has no pain with movement of the elbow and has essentially full range with limitations at extreme extension secondary to the pain in the bursa.  Bursa is not reddened or tense there does not appear to be any induration or lymphangitic streaking.  For this reason I think that gout, septic joint, and septic bursa are less likely.  Suspect simple olecranon bursitis.      Procedures    Assessment/Plan   Problem List Items Addressed This Visit    None  Visit Diagnoses       Olecranon bursitis of left elbow    -  Primary    Relevant Medications    predniSONE (Deltasone) 20 mg tablet    Other Relevant Orders    Arm Sling, Universal (Completed)            Patient disposition: Home

## 2024-07-21 ASSESSMENT — ENCOUNTER SYMPTOMS
CHEST TIGHTNESS: 0
SHORTNESS OF BREATH: 0
CHILLS: 0
DYSURIA: 0
VOMITING: 0
JOINT SWELLING: 1
FREQUENCY: 0
SORE THROAT: 0
CONSTIPATION: 0
ARTHRALGIAS: 1
COLOR CHANGE: 1
PALPITATIONS: 0
NAUSEA: 0
FEVER: 0
HEADACHES: 0
COUGH: 0
WHEEZING: 0
DIARRHEA: 0
ABDOMINAL PAIN: 0
RHINORRHEA: 0

## 2024-07-24 ENCOUNTER — NUTRITION (OUTPATIENT)
Dept: SURGERY | Facility: CLINIC | Age: 49
End: 2024-07-24

## 2024-07-24 ENCOUNTER — APPOINTMENT (OUTPATIENT)
Dept: PRIMARY CARE | Facility: CLINIC | Age: 49
End: 2024-07-24
Payer: COMMERCIAL

## 2024-07-24 VITALS
HEIGHT: 64 IN | HEART RATE: 88 BPM | SYSTOLIC BLOOD PRESSURE: 127 MMHG | OXYGEN SATURATION: 100 % | RESPIRATION RATE: 16 BRPM | TEMPERATURE: 97.7 F | WEIGHT: 275 LBS | DIASTOLIC BLOOD PRESSURE: 72 MMHG | BODY MASS INDEX: 46.95 KG/M2

## 2024-07-24 DIAGNOSIS — I25.2 HISTORY OF HEART ATTACK: ICD-10-CM

## 2024-07-24 DIAGNOSIS — E78.5 HYPERLIPIDEMIA, UNSPECIFIED HYPERLIPIDEMIA TYPE: ICD-10-CM

## 2024-07-24 DIAGNOSIS — E66.01 MORBID OBESITY WITH BMI OF 45.0-49.9, ADULT (MULTI): ICD-10-CM

## 2024-07-24 DIAGNOSIS — E11.9 TYPE 2 DIABETES MELLITUS WITHOUT COMPLICATION, WITHOUT LONG-TERM CURRENT USE OF INSULIN (MULTI): Primary | ICD-10-CM

## 2024-07-24 DIAGNOSIS — M21.619 BUNION: ICD-10-CM

## 2024-07-24 DIAGNOSIS — I10 HYPERTENSION, UNSPECIFIED TYPE: ICD-10-CM

## 2024-07-24 DIAGNOSIS — M70.22 OLECRANON BURSITIS OF LEFT ELBOW: ICD-10-CM

## 2024-07-24 DIAGNOSIS — Z95.5 STENTED CORONARY ARTERY: ICD-10-CM

## 2024-07-24 LAB — POC HEMOGLOBIN A1C: 7.4 % (ref 4.2–6.5)

## 2024-07-24 PROCEDURE — 83036 HEMOGLOBIN GLYCOSYLATED A1C: CPT | Performed by: NURSE PRACTITIONER

## 2024-07-24 PROCEDURE — 3078F DIAST BP <80 MM HG: CPT | Performed by: NURSE PRACTITIONER

## 2024-07-24 PROCEDURE — 4010F ACE/ARB THERAPY RXD/TAKEN: CPT | Performed by: NURSE PRACTITIONER

## 2024-07-24 PROCEDURE — 1036F TOBACCO NON-USER: CPT | Performed by: NURSE PRACTITIONER

## 2024-07-24 PROCEDURE — 3074F SYST BP LT 130 MM HG: CPT | Performed by: NURSE PRACTITIONER

## 2024-07-24 PROCEDURE — 99214 OFFICE O/P EST MOD 30 MIN: CPT | Performed by: NURSE PRACTITIONER

## 2024-07-24 PROCEDURE — 3008F BODY MASS INDEX DOCD: CPT | Performed by: NURSE PRACTITIONER

## 2024-07-24 RX ORDER — METOPROLOL SUCCINATE 25 MG/1
25 TABLET, EXTENDED RELEASE ORAL DAILY
Qty: 90 TABLET | Refills: 1 | Status: CANCELLED | OUTPATIENT
Start: 2024-07-24

## 2024-07-24 RX ORDER — SEMAGLUTIDE 1.34 MG/ML
0.5 INJECTION, SOLUTION SUBCUTANEOUS
Qty: 1 EACH | Refills: 5 | Status: CANCELLED | OUTPATIENT
Start: 2024-07-28

## 2024-07-24 RX ORDER — FAMOTIDINE 10 MG/1
40 TABLET ORAL DAILY
Qty: 360 TABLET | Refills: 1 | Status: CANCELLED | OUTPATIENT
Start: 2024-07-24

## 2024-07-24 RX ORDER — ICOSAPENT ETHYL 1 G/1
2 CAPSULE ORAL
Qty: 480 CAPSULE | Refills: 2 | Status: CANCELLED | OUTPATIENT
Start: 2024-07-24 | End: 2025-07-24

## 2024-07-24 RX ORDER — LISINOPRIL 10 MG/1
10 TABLET ORAL DAILY
Qty: 90 TABLET | Refills: 1 | Status: CANCELLED | OUTPATIENT
Start: 2024-07-24

## 2024-07-24 RX ORDER — METFORMIN HYDROCHLORIDE 500 MG/1
1000 TABLET, EXTENDED RELEASE ORAL 2 TIMES DAILY
Qty: 120 TABLET | Refills: 3 | Status: CANCELLED | OUTPATIENT
Start: 2024-07-24 | End: 2025-07-24

## 2024-07-24 RX ORDER — DAPAGLIFLOZIN 5 MG/1
5 TABLET, FILM COATED ORAL DAILY
Qty: 90 TABLET | Refills: 1 | Status: CANCELLED | OUTPATIENT
Start: 2024-07-24

## 2024-07-24 RX ORDER — CLOPIDOGREL BISULFATE 75 MG/1
75 TABLET ORAL DAILY
Qty: 90 TABLET | Refills: 1 | Status: CANCELLED | OUTPATIENT
Start: 2024-07-24

## 2024-07-24 RX ORDER — DAPAGLIFLOZIN 5 MG/1
5 TABLET, FILM COATED ORAL DAILY
Qty: 90 TABLET | Refills: 1 | Status: SHIPPED | OUTPATIENT
Start: 2024-07-24 | End: 2025-07-24

## 2024-07-24 ASSESSMENT — ENCOUNTER SYMPTOMS
PALPITATIONS: 0
NAUSEA: 0
ABDOMINAL PAIN: 0
DIARRHEA: 0
LIGHT-HEADEDNESS: 0
WEAKNESS: 0
APNEA: 0
SHORTNESS OF BREATH: 0
APPETITE CHANGE: 0
COUGH: 0
POLYDIPSIA: 0
POLYPHAGIA: 0
CHILLS: 0
CHEST TIGHTNESS: 0
STRIDOR: 0
DIFFICULTY URINATING: 0
UNEXPECTED WEIGHT CHANGE: 0
BRUISES/BLEEDS EASILY: 0
JOINT SWELLING: 1
WHEEZING: 0
CHOKING: 0
FEVER: 0
DIZZINESS: 0
FLANK PAIN: 0
ACTIVITY CHANGE: 0
HEADACHES: 0
DIAPHORESIS: 0
ADENOPATHY: 0
FATIGUE: 0
VOMITING: 0

## 2024-07-24 NOTE — PROGRESS NOTES
PREOPERATIVE, MULTIDISCIPLINARY, MEDICALLY SUPERVISED, REDUCED CALORIE DIET, BEHAVIOR MODIFICATION AND EXERCISE PROGRAM    S:  Patient attended a MS[facility] class     O:    Wt:  #255    Goal: 5% body weight loss over the course of program    Dietary recommendation:   1. Eliminate high calorie, carbonated, and caffeinated beverages  2. Practice the 30-30-30 rule by drinking between meals.  3. Structure your meal plan - have 3 meals and 1 snack daily.  4. Have balanced meals that always contain a good source of protein.  5. Increase intake of non-starchy vegetables.  Have 5 servings fruits and vegetables daily.   6. Take a multivitamin daily.  7. Increase physical activity by 10-15 minutes to an end goal of 60 minutes 5 x per week.    Group Topic: Sodium  - Are You Getting Too Much?  Behavioral recommendation: Pt is encouraged to limit daily sodium intake to a healthy level by using alternate spicing methods and remaining cautious of hidden sodium in prepared foods.    A/P: Pt appears to have a good understanding of how to monitor daily sodium intake through measuring salt while cooking and through reading nutrition facts labels on processed and pre-packaged foods.  Pt has a goal to maintain limit daily sodium intake to recommended levels as part of a general healthful diet.    Exercise: swimming 4 times a week for 15 minutes     Sandhya Linares, ALBERT, LD

## 2024-07-24 NOTE — PROGRESS NOTES
"Subjective   Patient ID: Patricia Gibbs is a 48 y.o. female who presents for Diabetes.     HPI  Patient in office for a follow-up on DM 2 (A1C: 7.4% today) and HTN (controlled). She has a hx of myocardial infarction (2019) and follows up with cardiology. She is being evaluated for bariatric surgery. Hx of GERD; poor response to long-term OTC Pepcid; she will consult with her bariatric surgeon on medication change. The patient went to an urgent care for left elbow bursitis; doing much better on Prednisone taper. The patient has a bunion x 1 year to the lateral right 1st MTP joint. her right foot; she requests a podiatry referral. No other concerns. She agrees to scheduling a physical and fasting blood work in 1 month.    Review of Systems   Constitutional:  Negative for activity change, appetite change, chills, diaphoresis, fatigue, fever and unexpected weight change.   Respiratory:  Negative for apnea, cough, choking, chest tightness, shortness of breath, wheezing and stridor.    Cardiovascular:  Negative for chest pain, palpitations and leg swelling.   Gastrointestinal:  Negative for abdominal pain, diarrhea, nausea and vomiting.   Endocrine: Negative for cold intolerance, heat intolerance, polydipsia, polyphagia and polyuria.   Genitourinary:  Negative for decreased urine volume, difficulty urinating, flank pain, pelvic pain and urgency.   Musculoskeletal:  Positive for joint swelling. Negative for gait problem.        Left elbow pain and swelling   Skin:  Negative for pallor and rash.   Neurological:  Negative for dizziness, syncope, weakness, light-headedness and headaches.   Hematological:  Negative for adenopathy. Does not bruise/bleed easily.       Objective   /72   Pulse 88   Temp 36.5 °C (97.7 °F) (Temporal)   Resp 16   Ht 1.626 m (5' 4\")   Wt 125 kg (275 lb)   SpO2 100%   BMI 47.20 kg/m²     Physical Exam  Constitutional:       Appearance: Normal appearance. She is obese.   HENT:      " Head: Normocephalic.   Eyes:      Conjunctiva/sclera: Conjunctivae normal.   Cardiovascular:      Rate and Rhythm: Normal rate and regular rhythm.      Pulses: Normal pulses.      Heart sounds: Normal heart sounds.   Pulmonary:      Effort: Pulmonary effort is normal.      Breath sounds: Normal breath sounds.   Musculoskeletal:         General: Tenderness present. No swelling.      Comments: Mild to moderate tenderness to palpation of left olecranon area; mild swelling; no erythema   Skin:     General: Skin is warm.      Coloration: Skin is not jaundiced or pale.   Neurological:      General: No focal deficit present.      Mental Status: She is alert.      Sensory: No sensory deficit.      Motor: No weakness.      Gait: Gait normal.   Psychiatric:         Mood and Affect: Mood normal.       Assessment/Plan     Exam findings reviewed with patient. Medications and referrals discussed. Patient may use OTC Tylenol for elbow pain. Advocated RICE for left elbow pain. Activity as tolerated. The patient will keep monitoring blood pressures and blood sugars at home; she will call the office with outliers or abnormal trends. Benefits of healthy diet and exercise reviewed. Follow up in 1 month for a physical and fasting blood work or earlier as needed.

## 2024-07-26 ENCOUNTER — TELEPHONE (OUTPATIENT)
Dept: PRIMARY CARE | Facility: CLINIC | Age: 49
End: 2024-07-26
Payer: COMMERCIAL

## 2024-07-26 DIAGNOSIS — K21.9 GASTROESOPHAGEAL REFLUX DISEASE WITHOUT ESOPHAGITIS: Primary | ICD-10-CM

## 2024-07-26 RX ORDER — PANTOPRAZOLE SODIUM 40 MG/1
40 TABLET, DELAYED RELEASE ORAL DAILY
Qty: 30 TABLET | Refills: 5 | Status: SHIPPED | OUTPATIENT
Start: 2024-07-26 | End: 2025-07-26

## 2024-07-26 NOTE — TELEPHONE ENCOUNTER
I spoke with the office and this was their reply:    yes, protonix is ok since she has not had surgery and given she is on plavix. good catch

## 2024-07-26 NOTE — TELEPHONE ENCOUNTER
Dr Watts's office reached out in regards to the GERD medication you wanted the patient to ask about.  Here is their reply:    I am the medical assistant for Dr. Carrasco, Bariatric surgeon for this patient. This patient reached out regarding a PPI script for heartburn. If Tra is able & willing to manage this script since she is still in the pre-op stages we would greatly appreciate it.  Dr. Carrasco's go to is omeprazole 40mg capsule taken once daily with breakfast. If that doesn't improve symptoms patients can take it twice daily (max dose). I fno improvement patient should reach out again to see is follow up is needed. I think it would be best to start with this since we will be prescribing it to her to take once daily for 6 months post op regardless if symptomatic to protect new stomach. after 6 months post op bypass patients usually no longer need the PPI.     Can you please send in the prescription for the following:    Omeprazole 40 mg 1 tablet daily #90  [facility]

## 2024-08-02 ENCOUNTER — TELEPHONE (OUTPATIENT)
Dept: SURGERY | Facility: CLINIC | Age: 49
End: 2024-08-02
Payer: COMMERCIAL

## 2024-08-02 NOTE — TELEPHONE ENCOUNTER
Outgoing call made to patient.  Spoke with patient letting her know that she needs to keep RD and SURGEON appt as these appts have .      Told her I will reach out to Tamia and ask her to please [place] back around for appt and I will do the same for Kimberly

## 2024-08-05 ENCOUNTER — TELEPHONE (OUTPATIENT)
Dept: SURGERY | Facility: CLINIC | Age: 49
End: 2024-08-05
Payer: COMMERCIAL

## 2024-08-05 PROBLEM — E66.813 OBESITY, CLASS III, BMI 40-49.9 (MORBID OBESITY): Status: ACTIVE | Noted: 2024-08-05

## 2024-08-05 PROBLEM — E66.01 OBESITY, CLASS III, BMI 40-49.9 (MORBID OBESITY) (MULTI): Status: ACTIVE | Noted: 2024-08-05

## 2024-08-05 PROBLEM — E66.9 OBESITY: Status: RESOLVED | Noted: 2019-09-05 | Resolved: 2024-08-05

## 2024-08-05 NOTE — TELEPHONE ENCOUNTER
Left message:  appointment scheduled with  Thursday 8/8 at 2pm in [facility] , already has scheduled appointment with Tamia same day

## 2024-08-05 NOTE — PATIENT INSTRUCTIONS
The following are some lifestyle changes you should begin to prepare you for your surgery.   Eliminate soda and other carbonated beverages from your diet. Carbonation will not be well tolerated after surgery. Try Propel, Vitamin Water Zero, Sobe Lifewater, Crystal Light or water.    Increase fluid consumption to 64 oz daily. Do not drink within 30 minutes of eating as this will liquefy your food and make you hungry more quickly.    Exercise for 30-60 minutes daily. Brisk walking, bike riding and swimming are all examples of healthy exercise. If you are unable to exercise we recommend seated exercise.    Do not skip meals.    Take a multivitamin daily.    Lose weight. In preparation for your surgery it is important that you begin making healthier food choices now. Our dietitian will meet with you to help you select foods lower in calories and higher in nutrition. We would like you to lose at least 10-20 lbs prior to surgery.     Increase your protein intake to 60 grams per day.    Alcohol is empty calories. Please eliminate while preparing for surgery.    Plan your meals.      General Instruction:   1) Use the information we gave you today to work through your insurance requirements and medical clearances.   2) These documents need to get faxed or emailed to the program navigators so they can submit them for approval from your insurance company.   3) Obtain labs today at a  facility. We will call you with any abnormalities and corrections you need to make.   4) Continue to work with your primary care doctor and other specialist so your other health problems are well controlled prior to your surgery.   5) Adopt the recommendations of the program dietician so you develop healthy eating patterns.   6) Work with the sleep team to get your sleep apnea treated to prevent other health problems .   7) Consider attending a support group to learn from other who have been through the process.   8) Come to the  MS[facility] sessions.

## 2024-08-05 NOTE — PROGRESS NOTES
Subjective     Date: 2024 Time: 10:15 AM  Name: Patricia Gibbs  MRN: [id number]      HPI: 48 y.o. female presenting today for follow up pre-bariatric surgery. Patient's initial surgical consult > 1 year ago.     HxHPI: This is a 48 y.o. female with morbid obesity (BMI > 47) who presents to clinic for consideration of bariatric surgery. she has attempted and failed multiple diet and exercise regimens for weight loss. Initial Onset of obesity was during adolescence and after pregnancy.  Their goal for surgery is to  be healthier  and lose weight. The patient has tried multiple diets to lose weight including Weight Watchers, Low Calorie, and keto . The patient was most successful with the low calorie diet.  The patient considers their dietary weakness to be  carbs, emotional eating, late night snacking  The patient reports a  highest weight ever of 312 pounds and lowest weight ever of 140 pounds Distribution of Obesity: is central. Current diet:  1500 Calories Per Day. Compliance: General Adherence Diet Problems: The patient exercises daily  15 Minutes/Day Types of Exercise : walking    Comorbidities: esophageal reflux, heart disease, high cholesterol, diabetes managed by oral medication , and hypertension controlled with oral meds    Gastric ByPass    2 = Symptoms noticeable and bothersome but not every day    PMH:   Past Medical History:   Diagnosis Date    Coronary artery disease     Diabetes mellitus type 2 in obese     Essential hypertension, benign     HLD (hyperlipidemia)     Obesity     STEMI (ST elevation myocardial infarction) (Multi)         PSH:   Past Surgical History:   Procedure Laterality Date    CORONARY STENT PLACEMENT  2019    on plavix    OTHER SURGICAL HISTORY  2022    Tonsillectomy    OTHER SURGICAL HISTORY  2022     section        Denies personal/family hx of VTE.    FAMILY HISTORY:  Family History   Problem Relation Name Age of Onset    Other (cva [Other] Depression,  [place]) Mother      Coronary artery disease Mother      No Known Problems Father      No Known Problems Sister      Other (Diabetes,HTN) Brother      No Known Problems Son      No Known Problems Maternal Grandmother      Other (Cardiac) Maternal Grandfather      No Known Problems Paternal Grandmother      No Known Problems Paternal Grandfather          SOCIAL HISTORY:  Social History     Tobacco Use    Smoking status: Former     Current packs/day: 0.00     Types: Cigarettes     Start date:      Quit date:      Years since quittin.6    Smokeless tobacco: Never   Substance Use Topics    Alcohol use: Yes     Comment: Socially- 2 x month    Drug use: Yes     Types: Marijuana       MEDICATIONS:  Prior to Admission Medications:  Medication Documentation Review Audit       Reviewed by Elizabeth Rehman MA (Medical Assistant) on 24 at 1752      Medication Order Taking? Sig Documenting Provider Last Dose Status   aspirin 81 mg EC tablet [id number] Yes Take 1 tablet (81 mg) by mouth once daily. Historical Provider, MD Taking Active   cetirizine (ZyrTEC) 10 mg tablet [id number] Yes Take 1 tablet (10 mg) by mouth once daily in the morning. Historical Provider, MD Taking Active   clopidogrel (Plavix) 75 mg tablet [id number] Yes Take 1 tablet (75 mg) by mouth once daily. Historical Provider, MD Taking Active   dapagliflozin (Farxiga) 5 mg [id number] Yes Take 1 tablet (5 mg) by mouth once daily. Historical Provider, MD Taking Active   famotidine (Pepcid) 10 mg tablet [id number] Yes Take 4 tablets (40 mg) by mouth once daily. Historical Provider, MD Taking Differently Active   icosapent ethyL (Vascepa) 1 gram capsule [id number] Yes Take 2 capsules (2 g) by mouth 2 times a day with meals. Sandra Yeung, APRN-CNP Taking Active   levonorgestrel (Mirena) 21 mcg/24 hours (8 yrs) 52 mg IUD [id number] Yes by intrauterine route. Historical Provider, MD Taking Active   lisinopril 10 mg tablet [id number] Yes Take 1 tablet (10  mg) by mouth once daily. Historical Provider, MD Taking Active   metFORMIN  mg 24 hr tablet [id number] Yes Take 2 tablets (1,000 mg) by mouth 2 times a day. SEBASTIEN Gannon Taking Active   metoprolol succinate XL (Toprol-XL) 25 mg 24 hr tablet [id number] Yes Take 1 tablet (25 mg) by mouth once daily. Do not crush or chew. Historical Provider, MD Taking Active   multivitamin tablet [id number] Yes Take 2 tablets by mouth 2 times a day. Historical Provider, MD Taking Active   predniSONE (Deltasone) 20 mg tablet [id number] Yes 3 tabs p.o. daily x3, 2 tabs  p.o. daily x3, 1 tab p.o. daily x3, one half tab p.o. daily x4 Rashad Fernandez,  Taking Active   semaglutide (Ozempic) 0.25 mg or 0.5 mg(2 mg/1.5 mL) pen injector [id number] Yes Inject 0.5 mg under the skin 1 (one) time per week. SEBASTIEN Gannon Taking Active                     ALLERGIES:  Allergies   Allergen Reactions    Bee Venom Protein (Honey Bee) Anaphylaxis    Codeine Hives     From cough-syrup    Montelukast Unknown       REVIEW OF SYSTEMS:  GENERAL: Negative for malaise, significant weight loss and fever  HEAD: Negative for headache, swelling.  NECK: Negative for lumps, goiter, pain and significant neck swelling  RESPIRATORY: Negative for cough, wheezing or shortness of breath.  CARDIOVASCULAR: Negative for chest pain, leg swelling or palpitations.  GI: Negative for abdominal discomfort, blood in stools or black stools or change in bowel habits  : No history of dysuria, frequency or incontinence  MUSCULOSKELETAL: Negative for joint pain or swelling, back pain or muscle pain.  SKIN: Negative for lesions, rash, and itching.  PSYCH: Negative for sleep disturbance, mood disorder and recent psychosocial stressors.  ENDOCRINE: Negative for cold or heat intolerance, polyuria, polydipsia and goiter.    Objective   PHYSICAL EXAM:  Visit Vitals  OB Status Having periods   Smoking Status Former       Assessment/Plan   IMPRESSION:  Patricia Gibbs  is a 48 y.o. female with a BMI of >47 with the following diagnoses and co-morbidities:     Past Medical History:   Diagnosis Date    Coronary artery disease     Diabetes mellitus type 2 in obese     Essential hypertension, benign     HLD (hyperlipidemia)     Obesity     STEMI (ST elevation myocardial infarction) (Multi)        This patient does meet the criteria for a surgical weight loss procedure according to NIH guidelines.    Patient was seen in the past and was considering gastric bypass at that point however she has been requiring NSAIDs and is on aspirin and Plavix from her cardiologist.  She has been diabetic since 2007.    We discussed the options overall simplicity by sleeve gastrectomy would be a better option with risk of post sleeve gastrectomy reflux issues.    With gastric bypass she will have to avoid taking NSAIDs and also risk of bleeding and potential difficulties with endoscopic interventions in future would have to be considered as well.    Other option would be sleeve gastrectomy with duodenal switch with better diabetes improvement however possibility of persistent reflux and high risk of protein calorie malnutrition and vitamin deficiencies in the past.    Her reflux is better compared to before.    After discussing all these issues she feels like she is more interested in simpler surgery and gastric bypass idea was scary to her.  I think in that situation sleeve gastrectomy would be a reasonable choice for her rather than any more complex malabsorptive operation specially with alteration of anatomy making further endoscopic interventions difficult the risk of bleeding with ongoing need for antiplatelet therapy etc.      The increased risk of substance and alcohol abuse following bariatric surgery was discussed with the patient, along with the negative consequences of substance/alcohol use after surgery including addiction, worsening of mental health disorders, and injury to the stomach. The  risk of smoking and vaping (tobacco or any other substance) after bariatric surgery was explained to the patient. This includes risk of anastamotic ulcers, gastritis, bleeding, perforation, stricture, and PO intolerance.  The patient expressed understanding and acceptance of these risks.    The patient was advised not to become pregnant within 12-18 months following bariatric surgery. She was educated on the increased risks to mother and fetus associated with pregnancy within 2 years of bariatric surgery.    The benefits of the above surgeries including weight loss, improvement/resolution of associated medical and mental health conditions, improved mobility, and decreased mortality have been explained the the patient and Patricia Gibbs has expressed understanding and acceptance of them.    She wants to proceed with sleeve gastrectomy.    PLAN:  The plan of treatment for Patricia Gibbs is to continue with the consultations and tests ordered today in hopes of qualifying for pre-operative clearance for sleeve gastrectomy.    This includes:    Consult Nutrition for education and 6 CONSECUTIVE months of MSWL (3 attended: 5/29/24, 6/27/24, 7/24/24)  Consult Psychology  Consult Cardiology  Consult Pulmonology  Labs- completed 12/11/2023  EGD- completed 12/4/2023  PCP for medical optimization  Consult sleep medicine - concern for MARCUS  Recommend at least 15 lbs of weight loss prior to surgery.

## 2024-08-07 ENCOUNTER — TELEPHONE (OUTPATIENT)
Dept: SURGERY | Facility: CLINIC | Age: 49
End: 2024-08-07
Payer: COMMERCIAL
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
## 2024-08-08 ENCOUNTER — TELEMEDICINE (OUTPATIENT)
Dept: SURGERY | Facility: CLINIC | Age: 49
End: 2024-08-08
Payer: COMMERCIAL

## 2024-08-08 ENCOUNTER — NUTRITION (OUTPATIENT)
Dept: SURGERY | Facility: CLINIC | Age: 49
End: 2024-08-08
Payer: COMMERCIAL

## 2024-08-08 ENCOUNTER — APPOINTMENT (OUTPATIENT)
Dept: SURGERY | Facility: CLINIC | Age: 49
End: 2024-08-08
Payer: COMMERCIAL

## 2024-08-08 VITALS — WEIGHT: 270 LBS | HEIGHT: 64 IN | BODY MASS INDEX: 46.1 KG/M2

## 2024-08-08 DIAGNOSIS — I21.3 ST ELEVATION MYOCARDIAL INFARCTION (STEMI), UNSPECIFIED ARTERY (MULTI): ICD-10-CM

## 2024-08-08 DIAGNOSIS — Z98.84 BARIATRIC SURGERY STATUS: ICD-10-CM

## 2024-08-08 DIAGNOSIS — Z13.21 ENCOUNTER FOR VITAMIN DEFICIENCY SCREENING: ICD-10-CM

## 2024-08-08 DIAGNOSIS — E11.9 CONTROLLED TYPE 2 DIABETES MELLITUS WITHOUT COMPLICATION, UNSPECIFIED WHETHER LONG TERM INSULIN USE (MULTI): ICD-10-CM

## 2024-08-08 DIAGNOSIS — E66.01 OBESITY, CLASS III, BMI 40-49.9 (MORBID OBESITY) (MULTI): Primary | ICD-10-CM

## 2024-08-08 DIAGNOSIS — Z71.3 PRE-BARIATRIC SURGERY NUTRITION EVALUATION: ICD-10-CM

## 2024-08-08 DIAGNOSIS — I10 HYPERTENSION, UNSPECIFIED TYPE: ICD-10-CM

## 2024-08-08 DIAGNOSIS — Z01.818 PRE-OP TESTING: ICD-10-CM

## 2024-08-08 DIAGNOSIS — E78.2 MIXED HYPERLIPIDEMIA: ICD-10-CM

## 2024-08-08 PROCEDURE — 99214 OFFICE O/P EST MOD 30 MIN: CPT | Mod: 95 | Performed by: SURGERY

## 2024-08-08 PROCEDURE — 99214 OFFICE O/P EST MOD 30 MIN: CPT | Performed by: SURGERY

## 2024-08-08 PROCEDURE — 4010F ACE/ARB THERAPY RXD/TAKEN: CPT | Performed by: SURGERY

## 2024-08-08 NOTE — PROGRESS NOTES
Surgeon: Danilo  Patient is considering: Sleeve gastrectomy     ASSESSMENT:  Current weight: 270.0  Ht: 64.0 in   BMI:  46.3        Initial start weight:  270.0  Pre-Op Excess Body Weight (EBW):   125.0  Target Post-Op weight goal:     188.7-207.5    Food allergies/intolerances:  none  Chewing/Swallowing/Dentition:   no issues  Diarrhea/ Constipation:   occasional diarrhea  Exercise level:   swimming or walking for 15 min 3-4x/week  Smoking/Tobacco use: none  Vitamins/Minerals supplements:  MVI  Past diet attempts:  IF, low carb, OTC diet pills, starvation, other  Hours of sleep/night: 6    24 HOUR RECALL/DIET HISTORY:  Breakfast:  sausage, 2 eggs, English muffin  Snack:    Lunch:   chicken sandwich  Snack:   Dinner: roast beef sandwich and [place]  Snack:   Beverages:    occasional 8 oz diet pop, 12-24 oz coffee/day,  36 oz flavored water/day  Alcohol: none    Person responsible for cooking & shopping?  Pt does both  How often do you eat sweet snacks?   5x/week  How often do you eat savory snacks?   10x/month   How often do you eat out?   2x/week; order in, FF, restaurants, delivery  Do you feel overly stuffed?    no  Binge Eating?    no  Night Eating?   Yes, occurs 1x/week; [place] leftover from dinner  Emotional Eating?   Yes        READINESS TO LEARN:  Motivation to learn: Interested        Understanding of instruction: Good   Anticipated Compliance: Good   Family Support:  Pt states that her  and children. Supportive.     Educational Materials Provided:    Schedules for MSWL class and support group   Calorie meal plan   Goals sheet    Nutrition assessment completed today.  Pt will be scheduled for a video education class at a later date to discuss the 2 week pre op diet, post op protein and fluid goals, vitamin and mineral supplementation, exercise goals, and post op diet progression.     Patient is seeking  sleeve gastrectomy  surgery    Recommend following  1500 calorie meal plan.  Recommend eating 3 meals  that include 3-4 oz protein and 2 high protein  snacks.  Reviewed the postop behaviors to start practicing.  Recommend increasing exercise. Suggested trying You Tube exercise videos.      Patient was receptive to nutritional recommendations, asked numerous questions, and verbalized understanding of the weight loss surgery diet.  Patient expressed understanding about the importance of strict dietary compliance post-surgery to avoid nutritional deficiencies and achieve optimal weight loss and verbalized intent to follow dietary recommendations.    Malnutrition Screening:  Significant unintentional weight loss? n/a   Eating less than 75% of usual intake for more than 2 weeks? n/a      Nutrition Diagnosis:   1. Overweight/obesity related to excess energy intake as evidenced by BMI = 40 kg/m^2.  2. Food- and nutrition-related knowledge deficit related to lack of prior exposure to surgical weight loss information as evidenced by pt new to surgical program.    Nutrition Interventions:   1. Modify type and amount of food and nutrients within meals and snacks.  2. Comprehensive Nutrition Education    Recommendations:  1. Begin following your meal plan.  Measure and record intake daily.   2. Structure meal patterns, eating three meals and 1-2 snacks per day.  3. Aim for 3-4 oz protein per meal.  Have 2 high protein snacks that are 10-20 g protein each.  You can try a tuna or chicken packet, Greek yogurt, 2 string cheeses, Protein bars like Quest, Pure Protein, Premier, or Built Bars. you can also try protein chips form [employer] or Atkins.    4. Drink 64oz of calorie-free, caffeine-free, and non-carbonated beverages.   5. Practice no drinking 30 minutes before meals, nothing with meals and wait 30 minutes after meals to drink again. Make meals last at least 20-30 minutes-chew thoroughly.   6. Limit or omit eating out/sweets/savory snacks to 1-2 times per week.  7. Begin daily multivitamin.  You can try Centrum Adult tablet.    8. Increase physical activity by 10-15 minutes as tolerated to an end goal of 60 minutes 5 x per week. Consistency is the key.  9. Attend monthly Zoom bariatric support groups.      Pre-op Goal weight: lose 5% of body weight    Nutrition Monitoring and Evaluation: 1-2 pound weight loss per week  Criteria: weight check  Need for Follow-up:     Patient does meet National Institutes Health guidelines for weight loss surgery, however needs to demonstrate consistent effort in making dietary changes before giving clearance. It is anticipated that the patient will need at least 1-2 nutritional follow-up visits prior to clearance for surgery.    Tamia Antonio RD, LD, [facility]  [phone]

## 2024-08-21 ENCOUNTER — NUTRITION (OUTPATIENT)
Dept: SURGERY | Facility: CLINIC | Age: 49
End: 2024-08-21
Payer: COMMERCIAL

## 2024-08-21 NOTE — PROGRESS NOTES
PREOPERATIVE, MULTIDISCIPLINARY, MEDICALLY SUPERVISED, REDUCED CALORIE DIET, BEHAVIOR MODIFICATION AND EXERCISE PROGRAM    S:      O:    Wt: 268lbs    Goal: 5% body weight loss over the course of program    Dietary recommendation:   1. Eliminate high calorie, carbonated, and caffeinated beverages  2. Practice the 30-30-30 rule by drinking between meals.  3. Structure your meal plan - have 3 meals and 1 snack daily.  4. Have balanced meals that always contain a good source of protein.  5. Increase intake of non-starchy vegetables.  Have 5 servings fruits and vegetables daily.   6. Take a multivitamin daily.  7. Increase physical activity by 10-15 minutes to an end goal of 60 minutes 5 x per week.    Group Topic: Meal Planning    Behavioral recommendation: Pt will plan daily meals and snacks in advance, balance components to build healthy meals, and take time to listen to the body's signs of hunger and satiety.     A/P: Pt appears to have a good understanding of how to build a healthy meal schedule and how to assemble a healthy meal.  Pt has a goal to eat 3 meals and 1 snack at regularly scheduled meal times, make balanced food choices, and to listen to the body's cues to prevent sensations of being both starving and over-stuffed.    Exercise: swimming and walking 4x per week for 25 minute     Yuliana Khan RDN, LD

## 2024-08-23 ENCOUNTER — APPOINTMENT (OUTPATIENT)
Dept: SURGERY | Facility: CLINIC | Age: 49
End: 2024-08-23
Payer: COMMERCIAL

## 2024-08-23 DIAGNOSIS — K21.9 GASTROESOPHAGEAL REFLUX DISEASE WITHOUT ESOPHAGITIS: ICD-10-CM

## 2024-08-23 RX ORDER — PANTOPRAZOLE SODIUM 40 MG/1
40 TABLET, DELAYED RELEASE ORAL DAILY
Qty: 90 TABLET | Refills: 1 | Status: SHIPPED | OUTPATIENT
Start: 2024-08-23 | End: 2025-08-23

## 2024-09-18 ENCOUNTER — NUTRITION (OUTPATIENT)
Dept: SURGERY | Facility: CLINIC | Age: 49
End: 2024-09-18
Payer: COMMERCIAL

## 2024-09-18 VITALS — HEIGHT: 64 IN | BODY MASS INDEX: 45.93 KG/M2 | WEIGHT: 269 LBS

## 2024-09-18 NOTE — PROGRESS NOTES
PREOPERATIVE, MULTIDISCIPLINARY, MEDICALLY SUPERVISED, REDUCED CALORIE DIET, BEHAVIOR MODIFICATION AND EXERCISE PROGRAM    S:   she is eating 3 meals per day that contain protein.  She snacks on dried fruit and nuts.  She is drinking 64 oz water or flavored water  and 1-2 c coffee.   She has diet pop 0-2x/week.  She is practicing the 30-30-30 rule.  She is working on eating slowly and chewing well.     Usual intake  B; egg, cottage cheese or yogurt  L  salad w/chicken or tuna  D:   chicken/pork/beef,  veggies, starch (noodles)     O:    Wt: 269.0      Ht:   64.0 in            BMI: 46.2    Goal: 5% body weight loss over the course of program    Dietary recommendation:   1. Wean off of  caffeinated beverages  2. Practice the 30-30-30 rule by drinking between meals.  3. Continue to have 3 meals and 1 snack daily.  4. Continue to have balanced meals that always contain a good source of protein.  5. Increase intake of non-starchy vegetables.  Have 5 servings fruits and vegetables daily.   6. Increase physical activity by 10-15 minutes to an end goal of 60 minutes 5 x per week.    A/P:    Pt is doing well making changes in her eating habits.  She is drinking more water, eating more protein and exercising.  She will continue work on making changes and lose weight.  She will follow up in 1 month with this RD.     Exercise:   walking for 30 min on lunch break 3x/week,  walking the dog for at least 20 min 3x/week    Tamia LUNA

## 2024-09-20 ENCOUNTER — APPOINTMENT (OUTPATIENT)
Dept: RADIOLOGY | Facility: CLINIC | Age: 49
End: 2024-09-20
Payer: COMMERCIAL

## 2024-09-20 DIAGNOSIS — Z12.31 SCREENING MAMMOGRAM FOR BREAST CANCER: ICD-10-CM

## 2024-09-24 DIAGNOSIS — E11.9 TYPE 2 DIABETES MELLITUS WITHOUT COMPLICATION, WITHOUT LONG-TERM CURRENT USE OF INSULIN (MULTI): ICD-10-CM

## 2024-09-24 RX ORDER — METFORMIN HYDROCHLORIDE 500 MG/1
1000 TABLET, EXTENDED RELEASE ORAL 2 TIMES DAILY
Qty: 360 TABLET | Refills: 0 | Status: SHIPPED | OUTPATIENT
Start: 2024-09-24

## 2024-09-25 NOTE — PROGRESS NOTES
Patient: Patricia Gibbs    [id number]  : 1975 -- AGE 48 y.o.    Provider: Bria CASPER- CNP     [facility] [place] [facility]   Service Date: 10/3/24            [facility]  New Visit Note      HISTORY OF PRESENT ILLNESS     The patient's referring provider is: Marcy Carrasco MD    HISTORY OF PRESENT ILLNESS   Patricia Gibbs is a 48 y.o. female with a history of MI 2019 s/p stent, obesity, hypertension, hyperlipidemia, type 2 diabetes and GERD, who is a current daily marijuana smoker, former social cigarette smoker (1 pack year), who presents to a [facility] for an initial evaluation for bariatric clearance.     I have independently interviewed and examined the patient in the office and reviewed available records.    Current History    On today's visit, the patient reports planning to have gastric sleeve surgery.  She reports her respiratory status is stable.  She denies cough, wheeze, shortness of breath, chest tightness and ER visits for breathing issues.    Previous pulmonary history: She has no history of recurrent infections, or lung disease as a child.  She had no previous lung hx, never on oxygen or inhaler therapy.     Inhalers/nebulized medications: never    Hospitalization History: She has not been hospitalized over the last year for breathing related problem.    Sleep history: Denies snoring, apnea, feeling tired during the day or taking naps during the day.        ALLERGIES AND MEDICATIONS     ALLERGIES  Allergies   Allergen Reactions    Bee Venom Protein (Honey Bee) Anaphylaxis    Codeine Hives     From cough-syrup    Montelukast Unknown       MEDICATIONS  Current Outpatient Medications   Medication Sig Dispense Refill    aspirin 81 mg EC tablet Take 1 tablet (81 mg) by mouth once daily.      cetirizine (ZyrTEC) 10 mg tablet Take 1 tablet (10 mg) by mouth once daily in the morning.      clopidogrel (Plavix)  75 mg tablet Take 1 tablet (75 mg) by mouth once daily.      dapagliflozin propanediol (Farxiga) 5 mg Take 1 tablet (5 mg) by mouth once daily. 90 tablet 1    icosapent ethyL (Vascepa) 1 gram capsule Take 2 capsules (2 g) by mouth 2 times a day with meals. 480 capsule 2    levonorgestrel (Mirena) 21 mcg/24 hours (8 yrs) 52 mg IUD by intrauterine route.      lisinopril 10 mg tablet Take 1 tablet (10 mg) by mouth once daily.      metFORMIN  mg 24 hr tablet Take 2 tablets (1,000 mg) by mouth 2 times a day. 360 tablet 0    metoprolol succinate XL (Toprol-XL) 25 mg 24 hr tablet Take 1 tablet (25 mg) by mouth once daily. Do not crush or chew.      multivitamin tablet Take 2 tablets by mouth 2 times a day.      pantoprazole (ProtoNix) 40 mg EC tablet Take 1 tablet (40 mg) by mouth once daily. Do not crush, chew, or split. 90 tablet 1    predniSONE (Deltasone) 20 mg tablet 3 tabs p.o. daily x3, 2 tabs  p.o. daily x3, 1 tab p.o. daily x3, one half tab p.o. daily x4 20 tablet 0    semaglutide (Ozempic) 0.25 mg or 0.5 mg (2 mg/3 mL) pen injector INJECT 0.5 MG UNDER THE SKIN 1 (ONE) TIME PER WEEK. 3 mL 1     No current facility-administered medications for this visit.         PAST HISTORY     PAST MEDICAL HISTORY  MI in 2019 s/p 1 Stent  DM2  HTN  HLD  GERD    PAST SURGICAL HISTORY  Past Surgical History:   Procedure Laterality Date    CORONARY STENT PLACEMENT  2019    on plavix    OTHER SURGICAL HISTORY  2022    Tonsillectomy    OTHER SURGICAL HISTORY  2022     section       IMMUNIZATION HISTORY  Immunization History   Administered Date(s) Administered    Flu vaccine (IIV4), preservative free *Check age/dose* 2019    Influenza, seasonal, injectable 2015    Moderna SARS-CoV-2 Vaccination 2022    Pfizer Purple Cap SARS-CoV-2 2021, 2021    Pneumococcal polysaccharide vaccine, 23-valent, age 2 years and older (PNEUMOVAX 23) 2018    Tdap vaccine, age 7 year and older  (BOOSTRIX, ADACEL) 2018       SOCIAL HISTORY  Tobacco Smokin-33 y/o - 1 pack/week - 1 pack year  Smokeless Tobacco/Vaping: none  Illicit drugs: marjiuana - smoking daily  Alcohol consumption: socially  Pets: 2 cats - 2 dogs    OCCUPATIONAL/ENVIRONMENTAL HISTORY  Occupation: Operation - office work  No known exposure to asbestos, silica, beryllium or inhaled metals.  No exposure to birds or exotic animals.    FAMILY HISTORY  Family History   Problem Relation Name Age of Onset    Other (cva [Other] Depression, [place]) Mother      Coronary artery disease Mother      No Known Problems Father      No Known Problems Sister      Other (Diabetes,HTN) Brother      No Known Problems Son      No Known Problems Maternal Grandmother      Other (Cardiac) Maternal Grandfather      No Known Problems Paternal Grandmother      No Known Problems Paternal Grandfather       Mother- COPD   Maternal uncle - prostate cancer  No family history of autoimmune disorders.    REVIEW OF SYSTEMS     REVIEW OF SYSTEMS  Review of Systems    Constitutional: No fever, no chills, no night sweats.    Eyes: No double vision, no floaters, no dry eyes.   ENT: See HPI.   Neck: No neck stiffness.  Cardiovascular: No sharp chest pain, no heart racing, no leg swelling.  Respiratory: as noted in HPI.   Gastrointestinal: No nausea, no vomiting, no diarrhea.   Musculoskeletal: No joint pain, no back pain.   Integumentary: No rashes or sores.  Neurological: No dizziness, no headaches. Sleeping well.  Psychiatric: No mood changes.   Endocrine: No hot flashes, no cold intolerance, weight is stable.  Hematologic: No easy bruising or bleeding.    PHYSICAL EXAM     VITAL SIGNS:   Vitals:    10/03/24 0844   BP: 120/81   Pulse: 80   Temp: 36.2 °C (97.2 °F)   SpO2: 98%         CURRENT WEIGHT: Body mass index is 47.72 kg/m².    PREVIOUS WEIGHTS:  Wt Readings from Last 3 Encounters:   24 122 kg (269 lb)   24 122 kg (270 lb)   24 125 kg  "(275 lb)       Physical Exam    Constitutional: General appearance: Alert and oriented.  No acute distress. Obese.  Head and face: Symmetric  ENT: external inspection of ear and nose normal. No intranasal polyps. No oropharyngeal exudates.    Oropharynx: normal   Neck: supple, no lymphadenopathy  Pulmonary: Chest is normal. No increased work of breathing or signs of respiratory distress. Clear to auscultation bilaterally - no crackles, wheezing, or rhonchi.   Cardiovascular: Heart rate and rhythm normal. Normal S1, S2 - no murmurs, gallops, or pericardial rub.   Abdomen: Soft, non tender, +BS  Extremities: No edema. No clubbing or cyanosis of the fingernails.    Neurologic: Moves all four extremities   MSK: Normal movements of extremities. Gait normal   Psychiatric: Intact judgement and insight.    RESULTS/DATA     Pulmonary Function Test Results     None on record    Chest Radiograph     No results found for this or any previous visit from the past 2000 days.      Chest CT Scan     No results found for this or any previous visit from the past 365 days.      Echocardiogram     No results found for this or any previous visit from the past 365 days.       Labwork     Lab Results   Component Value Date    WBC 8.8 12/11/2023    HGB 13.0 12/11/2023    HCT 42.3 12/11/2023    MCV 83 12/11/2023     12/11/2023      Lab Results   Component Value Date    GLUCOSE 99 12/11/2023    CALCIUM 9.4 12/11/2023     (L) 12/11/2023    K 5.2 12/11/2023    CO2 25 12/11/2023     12/11/2023    BUN 23 12/11/2023    CREATININE 1.12 (H) 12/11/2023      Lab Results   Component Value Date    ALT 12 12/11/2023    AST 17 12/11/2023    ALKPHOS 62 12/11/2023    BILITOT 0.4 12/11/2023        Protime   Date/Time Value Ref Range Status   12/11/2023 03:32 PM 10.4 9.8 - 12.8 seconds Final     INR   Date/Time Value Ref Range Status   12/11/2023 03:32 PM 0.9 0.9 - 1.1 Final       No results found for: \"ICIGE\", \"IGE\", \"ICA04\", \"ASPFU\", " "\"IGG\", \"IGA\", \"IGM\"    Peripheral Eosinophil Count/Percentage:   Eosinophils Absolute (x10*3/uL)   Date Value   12/11/2023 0.13     Eosinophils % (%)   Date Value   12/11/2023 1.5       ASSESSMENT/PLAN   Ms. Gibbs is a 48 y.o. female, with a history of MI 2019 s/p stent, obesity, hypertension, hyperlipidemia, type 2 diabetes and GERD, who is a current daily marijuana smoker, former social cigarette smoker (1 pack year), who presents to a [facility] for an initial evaluation for bariatric clearance.     Problem List and Orders  Diagnoses and all orders for this visit:  Preop pulmonary/respiratory exam  -     XR chest 2 views; Future  -     Complete Pulmonary Function Test (Spirometry/DLCO/Lung Volumes); Future  Obesity, Class III, BMI 40-49.9 (morbid obesity) (Multi)  -     Referral to Pulmonology  Bariatric surgery status  -     Referral to Pulmonology  -     XR chest 2 views; Future  -     Complete Pulmonary Function Test (Spirometry/DLCO/Lung Volumes); Future      Assessment and Plan / Recommendations:  Problem List Items Addressed This Visit    None     Bariatric clearance  -Will get chest x-ray  -Will get PFTs    Follow up in 4 weeks (after PFTs and chest x-ray) or sooner if needed. -Can be virtual or telephone visit    If you have any questions please call the office [phone]    Thank you for visiting the Pulmonary clinic today!   Bria Berg CNP  [phone]    "

## 2024-09-26 ENCOUNTER — APPOINTMENT (OUTPATIENT)
Dept: RADIOLOGY | Facility: CLINIC | Age: 49
End: 2024-09-26
Payer: COMMERCIAL

## 2024-10-03 ENCOUNTER — HOSPITAL ENCOUNTER (OUTPATIENT)
Dept: RADIOLOGY | Facility: HOSPITAL | Age: 49
Discharge: HOME | End: 2024-10-03
Payer: COMMERCIAL

## 2024-10-03 ENCOUNTER — APPOINTMENT (OUTPATIENT)
Dept: PULMONOLOGY | Facility: CLINIC | Age: 49
End: 2024-10-03
Payer: COMMERCIAL

## 2024-10-03 VITALS
OXYGEN SATURATION: 98 % | WEIGHT: 278 LBS | BODY MASS INDEX: 47.46 KG/M2 | DIASTOLIC BLOOD PRESSURE: 81 MMHG | TEMPERATURE: 97.2 F | HEIGHT: 64 IN | HEART RATE: 80 BPM | SYSTOLIC BLOOD PRESSURE: 120 MMHG

## 2024-10-03 DIAGNOSIS — Z01.811 PREOP PULMONARY/RESPIRATORY EXAM: Primary | ICD-10-CM

## 2024-10-03 DIAGNOSIS — Z98.84 BARIATRIC SURGERY STATUS: ICD-10-CM

## 2024-10-03 DIAGNOSIS — Z01.811 PREOP PULMONARY/RESPIRATORY EXAM: ICD-10-CM

## 2024-10-03 DIAGNOSIS — E66.01 OBESITY, CLASS III, BMI 40-49.9 (MORBID OBESITY) (MULTI): ICD-10-CM

## 2024-10-03 PROCEDURE — 71046 X-RAY EXAM CHEST 2 VIEWS: CPT

## 2024-10-03 PROCEDURE — 3074F SYST BP LT 130 MM HG: CPT

## 2024-10-03 PROCEDURE — 1036F TOBACCO NON-USER: CPT

## 2024-10-03 PROCEDURE — 99204 OFFICE O/P NEW MOD 45 MIN: CPT

## 2024-10-03 PROCEDURE — 4010F ACE/ARB THERAPY RXD/TAKEN: CPT

## 2024-10-03 PROCEDURE — 3008F BODY MASS INDEX DOCD: CPT

## 2024-10-03 PROCEDURE — 3079F DIAST BP 80-89 MM HG: CPT

## 2024-10-03 RX ORDER — EVOLOCUMAB 140 MG/ML
INJECTION, SOLUTION SUBCUTANEOUS
COMMUNITY

## 2024-10-03 ASSESSMENT — ENCOUNTER SYMPTOMS
OCCASIONAL FEELINGS OF UNSTEADINESS: 0
LOSS OF SENSATION IN FEET: 0
DEPRESSION: 0

## 2024-10-03 ASSESSMENT — PATIENT HEALTH QUESTIONNAIRE - PHQ9
SUM OF ALL RESPONSES TO PHQ9 QUESTIONS 1 AND 2: 0
2. FEELING DOWN, DEPRESSED OR HOPELESS: NOT AT ALL
1. LITTLE INTEREST OR PLEASURE IN DOING THINGS: NOT AT ALL

## 2024-10-03 ASSESSMENT — PAIN SCALES - GENERAL: PAINLEVEL: 0-NO PAIN

## 2024-10-16 ENCOUNTER — NUTRITION (OUTPATIENT)
Dept: SURGERY | Facility: CLINIC | Age: 49
End: 2024-10-16
Payer: COMMERCIAL

## 2024-10-16 VITALS — BODY MASS INDEX: 46.1 KG/M2 | HEIGHT: 64 IN | WEIGHT: 270 LBS

## 2024-10-16 NOTE — PROGRESS NOTES
PREOPERATIVE, MULTIDISCIPLINARY, MEDICALLY SUPERVISED, REDUCED CALORIE DIET, BEHAVIOR MODIFICATION AND EXERCISE PROGRAM    S:  Pt reports a very stressful month.  She has not been tracking her intake.  She is still mindful of what she I eating.  She has not drank any pop. She has been eating more grab and go types of foods.  She states that she is a stress eater.  She would like to discuss stress eating and what to do for that.   She is drinking at least 64 oz of fluid daily.  She has been eating 2 meals per day and skips either breakfast or lunch.      O:    Wt:  270.0                    Ht:   64.0 in            BMI: 46.3    Goal: 5% body weight loss over the course of program    Dietary recommendation:   1. Continue to practice the 30-30-30 rule by drinking between meals.  2. Structure your meal plan - have 3 meals and 1 snack daily. Have a protein bar or protein shake of your choosing for your missed meals.    3. Have balanced meals that always contain a good source of protein.  4. Increase intake of non-starchy vegetables.  Have 5 servings fruits and vegetables daily.   5. Increase physical activity by 10-15 minutes to an end goal of 60 minutes 5 x per week.    A/P:   Pt struggled a bit this month but will work on getting back on track. Discussed some ground techniques to use when feeling stressed and having an urge to eat.  Would like pt to work on weight loss of at least 5 pounds this month.   She will follow up next month for clearance.     Exercise:   walks  dog for 15-20 min daily weather permitting    Tamia Antonio RD, LD

## 2024-10-31 ENCOUNTER — HOSPITAL ENCOUNTER (OUTPATIENT)
Dept: RESPIRATORY THERAPY | Facility: HOSPITAL | Age: 49
Discharge: HOME | End: 2024-10-31
Payer: COMMERCIAL

## 2024-10-31 ENCOUNTER — HOSPITAL ENCOUNTER (OUTPATIENT)
Dept: RADIOLOGY | Facility: CLINIC | Age: 49
Discharge: HOME | End: 2024-10-31
Payer: COMMERCIAL

## 2024-10-31 ENCOUNTER — APPOINTMENT (OUTPATIENT)
Dept: PODIATRY | Facility: CLINIC | Age: 49
End: 2024-10-31
Payer: COMMERCIAL

## 2024-10-31 DIAGNOSIS — E11.49 TYPE II DIABETES MELLITUS WITH NEUROLOGICAL MANIFESTATIONS (MULTI): Primary | ICD-10-CM

## 2024-10-31 DIAGNOSIS — M79.671 PAIN IN BOTH FEET: ICD-10-CM

## 2024-10-31 DIAGNOSIS — M21.619 BUNION: ICD-10-CM

## 2024-10-31 DIAGNOSIS — Z01.811 PREOP PULMONARY/RESPIRATORY EXAM: ICD-10-CM

## 2024-10-31 DIAGNOSIS — M79.672 PAIN IN BOTH FEET: ICD-10-CM

## 2024-10-31 DIAGNOSIS — Z98.84 BARIATRIC SURGERY STATUS: ICD-10-CM

## 2024-10-31 LAB
MGC ASCENT PFT - FEV1 - PRE: 3.14
MGC ASCENT PFT - FEV1 - PREDICTED: 2.66
MGC ASCENT PFT - FVC - PRE: 3.73
MGC ASCENT PFT - FVC - PREDICTED: 3.25

## 2024-10-31 PROCEDURE — 99213 OFFICE O/P EST LOW 20 MIN: CPT | Performed by: PODIATRIST

## 2024-10-31 PROCEDURE — 4010F ACE/ARB THERAPY RXD/TAKEN: CPT | Performed by: PODIATRIST

## 2024-10-31 PROCEDURE — 94726 PLETHYSMOGRAPHY LUNG VOLUMES: CPT

## 2024-10-31 PROCEDURE — 73630 X-RAY EXAM OF FOOT: CPT | Mod: RT

## 2024-10-31 PROCEDURE — 99203 OFFICE O/P NEW LOW 30 MIN: CPT | Performed by: PODIATRIST

## 2024-11-06 ENCOUNTER — APPOINTMENT (OUTPATIENT)
Dept: PRIMARY CARE | Facility: CLINIC | Age: 49
End: 2024-11-06
Payer: COMMERCIAL

## 2024-11-07 ENCOUNTER — LAB (OUTPATIENT)
Dept: LAB | Facility: LAB | Age: 49
End: 2024-11-07
Payer: COMMERCIAL

## 2024-11-07 ENCOUNTER — HOSPITAL ENCOUNTER (OUTPATIENT)
Dept: RADIOLOGY | Facility: CLINIC | Age: 49
Discharge: HOME | End: 2024-11-07
Payer: COMMERCIAL

## 2024-11-07 ENCOUNTER — APPOINTMENT (OUTPATIENT)
Dept: PULMONOLOGY | Facility: CLINIC | Age: 49
End: 2024-11-07
Payer: COMMERCIAL

## 2024-11-07 VITALS — HEIGHT: 64 IN | BODY MASS INDEX: 45.92 KG/M2 | WEIGHT: 268.96 LBS

## 2024-11-07 DIAGNOSIS — Z01.818 PRE-OPERATIVE CLEARANCE: ICD-10-CM

## 2024-11-07 DIAGNOSIS — E66.01 OBESITY, CLASS III, BMI 40-49.9 (MORBID OBESITY) (MULTI): ICD-10-CM

## 2024-11-07 DIAGNOSIS — Z98.84 BARIATRIC SURGERY STATUS: ICD-10-CM

## 2024-11-07 DIAGNOSIS — R82.5 POSITIVE URINE DRUG SCREEN: ICD-10-CM

## 2024-11-07 DIAGNOSIS — Z01.811 PREOP PULMONARY/RESPIRATORY EXAM: Primary | ICD-10-CM

## 2024-11-07 DIAGNOSIS — Z12.31 SCREENING MAMMOGRAM FOR BREAST CANCER: ICD-10-CM

## 2024-11-07 DIAGNOSIS — Z01.818 PRE-OP TESTING: ICD-10-CM

## 2024-11-07 LAB
AMPHETAMINES UR QL SCN: NORMAL
BARBITURATES UR QL SCN: NORMAL
BENZODIAZ UR QL SCN: NORMAL
BZE UR QL SCN: NORMAL
CANNABINOIDS UR QL SCN: NORMAL
FENTANYL+NORFENTANYL UR QL SCN: NORMAL
METHADONE UR QL SCN: NORMAL
OPIATES UR QL SCN: NORMAL
OXYCODONE+OXYMORPHONE UR QL SCN: NORMAL
PCP UR QL SCN: NORMAL

## 2024-11-07 PROCEDURE — 36415 COLL VENOUS BLD VENIPUNCTURE: CPT

## 2024-11-07 PROCEDURE — 77067 SCR MAMMO BI INCL CAD: CPT

## 2024-11-07 PROCEDURE — 80307 DRUG TEST PRSMV CHEM ANLYZR: CPT

## 2024-11-07 PROCEDURE — 77063 BREAST TOMOSYNTHESIS BI: CPT | Performed by: RADIOLOGY

## 2024-11-07 PROCEDURE — 99214 OFFICE O/P EST MOD 30 MIN: CPT

## 2024-11-07 PROCEDURE — 77067 SCR MAMMO BI INCL CAD: CPT | Performed by: RADIOLOGY

## 2024-11-07 PROCEDURE — 80349 CANNABINOIDS NATURAL: CPT

## 2024-11-07 PROCEDURE — 4010F ACE/ARB THERAPY RXD/TAKEN: CPT

## 2024-11-07 ASSESSMENT — COLUMBIA-SUICIDE SEVERITY RATING SCALE - C-SSRS
6. HAVE YOU EVER DONE ANYTHING, STARTED TO DO ANYTHING, OR PREPARED TO DO ANYTHING TO END YOUR LIFE?: NO
1. IN THE PAST MONTH, HAVE YOU WISHED YOU WERE DEAD OR WISHED YOU COULD GO TO SLEEP AND NOT WAKE UP?: NO
2. HAVE YOU ACTUALLY HAD ANY THOUGHTS OF KILLING YOURSELF?: NO

## 2024-11-09 ENCOUNTER — TELEMEDICINE CLINICAL SUPPORT (OUTPATIENT)
Dept: SURGERY | Facility: CLINIC | Age: 49
End: 2024-11-09
Payer: COMMERCIAL

## 2024-11-12 LAB
AMPHETAMINES SERPL QL SCN: NEGATIVE NG/ML
ANNOTATION COMMENT IMP: NORMAL
BARBITURATES SERPL QL SCN: NEGATIVE NG/ML
BENZODIAZ SERPL QL SCN: NEGATIVE NG/ML
BUPRENORPHINE SERPL-MCNC: NEGATIVE NG/ML
CANNABINOIDS SERPL QL SCN: POSITIVE NG/ML
COCAINE SERPL QL SCN: NEGATIVE NG/ML
METHADONE SERPL QL SCN: NEGATIVE NG/ML
METHAMPHET SERPL QL: NEGATIVE NG/ML
OPIATES SERPL QL SCN: NEGATIVE NG/ML
OXYCODONE SERPL QL: NEGATIVE NG/ML
PCP SERPL QL SCN: NEGATIVE NG/ML

## 2024-11-13 ENCOUNTER — APPOINTMENT (OUTPATIENT)
Dept: SURGERY | Facility: CLINIC | Age: 49
End: 2024-11-13
Payer: COMMERCIAL

## 2024-11-15 LAB — CANNABINOIDS SERPL-MCNC: <5 NG/ML

## 2024-11-21 ENCOUNTER — OFFICE VISIT (OUTPATIENT)
Dept: PRIMARY CARE | Facility: CLINIC | Age: 49
End: 2024-11-21
Payer: COMMERCIAL

## 2024-11-21 ENCOUNTER — TELEPHONE (OUTPATIENT)
Dept: SURGERY | Facility: CLINIC | Age: 49
End: 2024-11-21

## 2024-11-21 VITALS
WEIGHT: 271 LBS | BODY MASS INDEX: 46.26 KG/M2 | OXYGEN SATURATION: 96 % | TEMPERATURE: 96.9 F | DIASTOLIC BLOOD PRESSURE: 76 MMHG | HEART RATE: 80 BPM | SYSTOLIC BLOOD PRESSURE: 120 MMHG | HEIGHT: 64 IN

## 2024-11-21 DIAGNOSIS — J01.40 ACUTE NON-RECURRENT PANSINUSITIS: Primary | ICD-10-CM

## 2024-11-21 DIAGNOSIS — H61.23 BILATERAL IMPACTED CERUMEN: ICD-10-CM

## 2024-11-21 DIAGNOSIS — R09.81 SINUS CONGESTION: ICD-10-CM

## 2024-11-21 DIAGNOSIS — R06.02 SOB (SHORTNESS OF BREATH) ON EXERTION: ICD-10-CM

## 2024-11-21 LAB — POC SARS-COV-2 AG BINAX: NORMAL

## 2024-11-21 PROCEDURE — 3074F SYST BP LT 130 MM HG: CPT | Performed by: NURSE PRACTITIONER

## 2024-11-21 PROCEDURE — 3078F DIAST BP <80 MM HG: CPT | Performed by: NURSE PRACTITIONER

## 2024-11-21 PROCEDURE — 4010F ACE/ARB THERAPY RXD/TAKEN: CPT | Performed by: NURSE PRACTITIONER

## 2024-11-21 PROCEDURE — 3008F BODY MASS INDEX DOCD: CPT | Performed by: NURSE PRACTITIONER

## 2024-11-21 PROCEDURE — 87811 SARS-COV-2 COVID19 W/OPTIC: CPT | Performed by: NURSE PRACTITIONER

## 2024-11-21 PROCEDURE — 1036F TOBACCO NON-USER: CPT | Performed by: NURSE PRACTITIONER

## 2024-11-21 PROCEDURE — 99214 OFFICE O/P EST MOD 30 MIN: CPT | Performed by: NURSE PRACTITIONER

## 2024-11-21 RX ORDER — ALBUTEROL SULFATE 90 UG/1
2 INHALANT RESPIRATORY (INHALATION) EVERY 6 HOURS PRN
Qty: 6.7 G | Refills: 0 | Status: SHIPPED | OUTPATIENT
Start: 2024-11-21 | End: 2025-11-21

## 2024-11-21 RX ORDER — AZITHROMYCIN 250 MG/1
TABLET, FILM COATED ORAL
Qty: 6 TABLET | Refills: 0 | Status: SHIPPED | OUTPATIENT
Start: 2024-11-21 | End: 2024-11-26

## 2024-11-21 ASSESSMENT — ENCOUNTER SYMPTOMS
COUGH: 1
DEPRESSION: 0
CHILLS: 0
SORE THROAT: 1
FLU SYMPTOMS: 1
FEVER: 0
LOSS OF SENSATION IN FEET: 0
OCCASIONAL FEELINGS OF UNSTEADINESS: 0
DIAPHORESIS: 1
HEADACHES: 1

## 2024-11-21 ASSESSMENT — PATIENT HEALTH QUESTIONNAIRE - PHQ9
2. FEELING DOWN, DEPRESSED OR HOPELESS: NOT AT ALL
SUM OF ALL RESPONSES TO PHQ9 QUESTIONS 1 AND 2: 0
1. LITTLE INTEREST OR PLEASURE IN DOING THINGS: NOT AT ALL

## 2024-11-21 ASSESSMENT — PAIN SCALES - GENERAL: PAINLEVEL_OUTOF10: 0-NO PAIN

## 2024-11-21 NOTE — PROGRESS NOTES
"Subjective   Patient ID: Patricia Gibbs is a 49 y.o. female who presents for New Patient Visit and Sick Visit (Cough and congestion ).    Flu Symptoms  This is a new problem. The current episode started in the past 7 days. Associated symptoms include congestion, coughing, diaphoresis, headaches and a sore throat. Pertinent negatives include no chills or fever. Associated symptoms comments: Cough with yellow mucous,foul smell  Sinus pressure. The symptoms are aggravated by bending, coughing and exertion. Treatments tried: Coricidin,Ibuprofen. The treatment provided no relief.        Review of Systems   Constitutional:  Positive for diaphoresis. Negative for chills and fever.   HENT:  Positive for congestion and sore throat.    Respiratory:  Positive for cough.    Neurological:  Positive for headaches.       Objective   /76 (BP Location: Right arm, Patient Position: Sitting)   Pulse 80   Temp 36.1 °C (96.9 °F)   Ht 1.626 m (5' 4.02\")   Wt 123 kg (271 lb)   SpO2 96%   BMI 46.49 kg/m²     Physical Exam  Vitals reviewed.   Constitutional:       Appearance: Normal appearance.   HENT:      Right Ear: There is impacted cerumen.      Left Ear: There is impacted cerumen.      Nose:      Right Turbinates: Enlarged and swollen.      Left Turbinates: Enlarged and swollen.      Right Sinus: Maxillary sinus tenderness and frontal sinus tenderness present.      Left Sinus: Maxillary sinus tenderness and frontal sinus tenderness present.      Mouth/Throat:      Pharynx: Posterior oropharyngeal erythema and postnasal drip present.   Cardiovascular:      Rate and Rhythm: Normal rate and regular rhythm.   Pulmonary:      Effort: Pulmonary effort is normal.      Breath sounds: Examination of the right-upper field reveals decreased breath sounds. Examination of the left-upper field reveals decreased breath sounds. Decreased breath sounds present. No wheezing.   Neurological:      Mental Status: She is alert. "         Assessment/Plan   Problem List Items Addressed This Visit             ICD-10-CM    Acute non-recurrent pansinusitis - Primary J01.40    Relevant Medications    azithromycin (Zithromax) 250 mg tablet    albuterol (ProAir HFA) 90 mcg/actuation inhaler    SOB (shortness of breath) on exertion R06.02    Relevant Medications    albuterol (ProAir HFA) 90 mcg/actuation inhaler    Sinus congestion R09.81    Relevant Orders    POCT BinaxNOW Covid-19 Ag Card manually resulted (Completed)    Bilateral impacted cerumen H61.23    Relevant Medications    carbamide peroxide (Debrox) 6.5 % otic solution

## 2024-11-23 ENCOUNTER — NUTRITION (OUTPATIENT)
Dept: SURGERY | Facility: CLINIC | Age: 49
End: 2024-11-23
Payer: COMMERCIAL

## 2024-11-23 VITALS — BODY MASS INDEX: 45.07 KG/M2 | WEIGHT: 264 LBS | HEIGHT: 64 IN

## 2024-11-23 NOTE — PROGRESS NOTES
PREOPERATIVE, MULTIDISCIPLINARY, MEDICALLY SUPERVISED, REDUCED CALORIE DIET, BEHAVIOR MODIFICATION AND EXERCISE PROGRAM    S:    Pt reports that she is working on following her 1500 calorie meal plan.  She is eating more chicken and broccoli.  She eats a protein bar or has a protein shake for a snack.  She is working not gulping.  She improving upon the 30-30-30 rule.  She tends to better at lunch and dinner.  Pt has been eating more protein and veggies and cutting down on carb intake.   Pt drinks 64 oz water or flavored water. She has coke Zero 3x/week.  She drinks that as a sweet treat.      Usual intake:  B: 2 eggs or protein bar or cottage cheese   S;   L;   turkey on 1 slice bread or cottage cheese or turkey meat alone.   S;  snap peas or cukes  D;  chicken w/veggies   S;       O:    Wt:  264.0     Ht:    64.0           BMI: 45.3    Goal: 5% body weight loss over the course of program    Dietary recommendation:   1. Eliminate caffeinated and carbonated beverages  2. Continue to practice the 30-30-30 rule by drinking between meals.  3. Continue have 3 meals and 1 snack daily.  4. Have balanced meals that always contain a good source of protein.  Aim for 3-4 oz protein per meal.   5. Increase intake of non-starchy vegetables.  Have 5 servings fruits and vegetables daily.   6. Increase physical activity by 10-15 minutes to an end goal of 60 minutes 5 x per week.    A/P:   Pt is doing well making changes and losing weight.  She will follow up next month for nutrition clearance.      Exercise:   exercise videos for  10-15 min  3x/week and some walking outside     Tamia Antonio RD, JEREMY

## 2024-11-27 ENCOUNTER — APPOINTMENT (OUTPATIENT)
Dept: PRIMARY CARE | Facility: CLINIC | Age: 49
End: 2024-11-27
Payer: COMMERCIAL

## 2024-12-03 DIAGNOSIS — E11.9 TYPE 2 DIABETES MELLITUS WITHOUT COMPLICATION, WITHOUT LONG-TERM CURRENT USE OF INSULIN (MULTI): ICD-10-CM

## 2024-12-04 ENCOUNTER — APPOINTMENT (OUTPATIENT)
Dept: BEHAVIORAL HEALTH | Facility: CLINIC | Age: 49
End: 2024-12-04
Payer: COMMERCIAL

## 2024-12-04 RX ORDER — SEMAGLUTIDE 0.68 MG/ML
INJECTION, SOLUTION SUBCUTANEOUS
Qty: 3 ML | Refills: 0 | Status: SHIPPED | OUTPATIENT
Start: 2024-12-04

## 2024-12-14 ENCOUNTER — APPOINTMENT (OUTPATIENT)
Dept: SURGERY | Facility: CLINIC | Age: 49
End: 2024-12-14
Payer: COMMERCIAL

## 2024-12-16 ENCOUNTER — TELEPHONE (OUTPATIENT)
Dept: SURGERY | Facility: CLINIC | Age: 49
End: 2024-12-16
Payer: COMMERCIAL

## 2024-12-25 NOTE — PROGRESS NOTES
"The patient has been scheduled at 10:20 AM for a virtual visit. However, she did not answer the MA calls 3 times. The ROSEMARY Price finally got her information and passed it to me. Then I logged in to the PC for a virtual visit at 10:40 and found the patient left a message: \" I cannot wait any longer. Will have to reschedule.\" at  10:38 AM. MA called her back, and she did not answer again. The appointment has been canceled.    "

## 2024-12-27 NOTE — TELEPHONE ENCOUNTER
12/27 left message to reschedule RD visit  12/17 Tamia left message  to reschedule RD visit  12/16 Left message  to reschedule RD visit

## 2025-01-02 ENCOUNTER — APPOINTMENT (OUTPATIENT)
Dept: PRIMARY CARE | Facility: CLINIC | Age: 50
End: 2025-01-02
Payer: COMMERCIAL

## 2025-01-02 VITALS
DIASTOLIC BLOOD PRESSURE: 78 MMHG | TEMPERATURE: 97.3 F | OXYGEN SATURATION: 97 % | WEIGHT: 264 LBS | HEART RATE: 72 BPM | SYSTOLIC BLOOD PRESSURE: 142 MMHG | BODY MASS INDEX: 45.32 KG/M2

## 2025-01-02 DIAGNOSIS — E78.2 MIXED HYPERLIPIDEMIA: ICD-10-CM

## 2025-01-02 DIAGNOSIS — E87.5 HYPERKALEMIA: ICD-10-CM

## 2025-01-02 DIAGNOSIS — E11.9 TYPE 2 DIABETES MELLITUS WITHOUT COMPLICATION, WITHOUT LONG-TERM CURRENT USE OF INSULIN (MULTI): ICD-10-CM

## 2025-01-02 DIAGNOSIS — M65.342 TRIGGER RING FINGER OF LEFT HAND: ICD-10-CM

## 2025-01-02 DIAGNOSIS — Z79.4 CONTROLLED TYPE 2 DIABETES MELLITUS WITHOUT COMPLICATION, WITH LONG-TERM CURRENT USE OF INSULIN (MULTI): ICD-10-CM

## 2025-01-02 DIAGNOSIS — I10 PRIMARY HYPERTENSION: ICD-10-CM

## 2025-01-02 DIAGNOSIS — Z00.00 ANNUAL PHYSICAL EXAM: Primary | ICD-10-CM

## 2025-01-02 DIAGNOSIS — I25.110 CORONARY ARTERY DISEASE INVOLVING NATIVE CORONARY ARTERY OF NATIVE HEART WITH UNSTABLE ANGINA PECTORIS: ICD-10-CM

## 2025-01-02 DIAGNOSIS — H61.23 BILATERAL IMPACTED CERUMEN: ICD-10-CM

## 2025-01-02 DIAGNOSIS — E11.9 CONTROLLED TYPE 2 DIABETES MELLITUS WITHOUT COMPLICATION, WITH LONG-TERM CURRENT USE OF INSULIN (MULTI): ICD-10-CM

## 2025-01-02 DIAGNOSIS — E78.5 HYPERLIPIDEMIA, UNSPECIFIED HYPERLIPIDEMIA TYPE: ICD-10-CM

## 2025-01-02 DIAGNOSIS — Z12.11 COLON CANCER SCREENING: ICD-10-CM

## 2025-01-02 DIAGNOSIS — Z98.84 BARIATRIC SURGERY STATUS: ICD-10-CM

## 2025-01-02 DIAGNOSIS — M67.40 GANGLION CYST: ICD-10-CM

## 2025-01-02 PROCEDURE — 3077F SYST BP >= 140 MM HG: CPT | Performed by: NURSE PRACTITIONER

## 2025-01-02 PROCEDURE — 4010F ACE/ARB THERAPY RXD/TAKEN: CPT | Performed by: NURSE PRACTITIONER

## 2025-01-02 PROCEDURE — 3078F DIAST BP <80 MM HG: CPT | Performed by: NURSE PRACTITIONER

## 2025-01-02 PROCEDURE — 99396 PREV VISIT EST AGE 40-64: CPT | Performed by: NURSE PRACTITIONER

## 2025-01-02 RX ORDER — ICOSAPENT ETHYL 1 G/1
2 CAPSULE ORAL
Qty: 480 CAPSULE | Refills: 2 | Status: SHIPPED | OUTPATIENT
Start: 2025-01-02 | End: 2026-01-02

## 2025-01-02 ASSESSMENT — PATIENT HEALTH QUESTIONNAIRE - PHQ9
2. FEELING DOWN, DEPRESSED OR HOPELESS: NOT AT ALL
1. LITTLE INTEREST OR PLEASURE IN DOING THINGS: NOT AT ALL
SUM OF ALL RESPONSES TO PHQ9 QUESTIONS 1 AND 2: 0

## 2025-01-02 NOTE — PATIENT INSTRUCTIONS
Return for Fasting Labs  Nothing to eat or drink for 10 hours. Black coffee, black tea or water is ok    Labs will be released to My Chart or if you are not connected you will be notified of abnormals only    Health Maintenance  Continue to follow a healthy diet with fruits and vegetables at most meals.  Daily exercise is recommended as well as adding weights 3 times a week to maintain muscle mass and for bone health.  It is recommended you drink 6 to 8 glasses of water daily, more when you are exerting yourself or in hot weather.  Make sure you follow the health screening guidelines and keep up to date on your immunizations!      
yes

## 2025-01-02 NOTE — PROGRESS NOTES
"Subjective   Patient ID: Patricia Gibbs is a 49 y.o. female who presents for Annual Exam (Trigger finger/Ring finger/Right shoulder pain.).    HPI  Pleasant established 48 y/o female with PMH Obesity, DM2, CAD, HTN, HLD, STEMI, Cardiac stent who presents for Annual exam    Current concerns  Obesity, DM2- Currently following with bariatric Clinic, Dr Carrasco for gastric surgery. Reports she has completed screenings, Cardiology, Nutritionist, here today and is scheduled for a sleep study and Psychiatry this months.     MI 2019, stented, HLD- follows with Cardiology yearly, Dr Sepulveda      Works as [employer]  1 son age 26    Diet \"not good\", protein shake if not eating, counseled  Caffeine 1-2  Water 32 oz  Exercise walk 4 x week    Non-smoker  Alcohol Social Rare      Eye exam 2024  Dental exam 2024  Gyn no abnormal pap IUD 2023  Colonoscopy refer  Mammogram Oct 2024    Family history  Dad unknown  Mom MI, CABG x 4 in her 40's, \"37 operations\"  Sibs DM    Review of Systems  Review of Systems   Constitutional: Negative.    HENT: Negative.     Respiratory: Negative.     Cardiovascular: Negative.    Gastrointestinal: Negative.    Genitourinary: Negative.    Musculoskeletal: Negative.    Psychiatric/Behavioral: Negative.     All other systems reviewed and are negative.    .vsVisit Vitals  /78   Pulse 72   Temp 36.3 °C (97.3 °F)   Wt 120 kg (264 lb)   SpO2 97%   BMI 45.32 kg/m²   OB Status IUD   Smoking Status Former   BSA 2.33 m²         Objective   Physical Exam  Vitals and nursing note reviewed.   Constitutional:       Appearance: Normal appearance. She is obese.   HENT:      Head: Normocephalic and atraumatic.      Right Ear: Tympanic membrane and ear canal normal.      Left Ear: Tympanic membrane and ear canal normal.      Nose: Nose normal.      Mouth/Throat:      Pharynx: Oropharynx is clear.   Eyes:      Extraocular Movements: Extraocular movements intact.      Conjunctiva/sclera: Conjunctivae normal.      " Pupils: Pupils are equal, round, and reactive to light.   Cardiovascular:      Rate and Rhythm: Normal rate and regular rhythm.      Pulses: Normal pulses.      Heart sounds: Normal heart sounds.   Pulmonary:      Effort: Pulmonary effort is normal.      Breath sounds: Normal breath sounds. No wheezing.   Abdominal:      General: Bowel sounds are normal. There is no distension.      Palpations: Abdomen is soft.      Tenderness: There is no abdominal tenderness.   Musculoskeletal:         General: Normal range of motion.      Cervical back: Normal range of motion and neck supple.   Skin:     General: Skin is warm.      Capillary Refill: Capillary refill takes 2 to 3 seconds.   Neurological:      General: No focal deficit present.      Mental Status: She is alert.   Psychiatric:         Mood and Affect: Mood normal.         Assessment/Plan   Problem List Items Addressed This Visit       Controlled type 2 diabetes mellitus without complication (Multi)    Relevant Orders    Hemoglobin A1C    Coronary artery disease involving native coronary artery of native heart with unstable angina pectoris    Diabetes mellitus (Multi)    Relevant Medications    dapagliflozin propanediol (Farxiga) 5 mg    Bariatric surgery status    Hyperkalemia    Relevant Orders    Comprehensive Metabolic Panel    Hypertension    Relevant Orders    CBC    Comprehensive Metabolic Panel    Mixed hyperlipidemia    Bilateral impacted cerumen    Relevant Orders    Ear cerumen removal    Annual physical exam - Primary    Relevant Orders    CBC    Comprehensive Metabolic Panel    Vitamin D 25-Hydroxy,Total (for eval of Vitamin D levels)    TSH with reflex to Free T4 if abnormal    Hemoglobin A1C    Referral to Gastroenterology    Colon cancer screening    Relevant Orders    Referral to Gastroenterology    Ganglion cyst    Trigger ring finger of left hand     Other Visit Diagnoses       Hyperlipidemia, unspecified hyperlipidemia type        Relevant  Medications    icosapent ethyL (Vascepa) 1 gram capsule

## 2025-01-03 ENCOUNTER — OFFICE VISIT (OUTPATIENT)
Dept: SLEEP MEDICINE | Facility: CLINIC | Age: 50
End: 2025-01-03
Payer: COMMERCIAL

## 2025-01-03 DIAGNOSIS — I10 PRIMARY HYPERTENSION: ICD-10-CM

## 2025-01-03 DIAGNOSIS — E66.01 OBESITY, CLASS III, BMI 40-49.9 (MORBID OBESITY) (MULTI): ICD-10-CM

## 2025-01-03 DIAGNOSIS — R09.81 SINUS CONGESTION: ICD-10-CM

## 2025-01-03 DIAGNOSIS — G47.30 SLEEP-RELATED BREATHING DISORDER: Primary | ICD-10-CM

## 2025-01-03 DIAGNOSIS — Z98.84 BARIATRIC SURGERY STATUS: ICD-10-CM

## 2025-01-03 DIAGNOSIS — I21.3 ST ELEVATION MYOCARDIAL INFARCTION (STEMI), UNSPECIFIED ARTERY (MULTI): ICD-10-CM

## 2025-01-03 RX ORDER — SEMAGLUTIDE 0.68 MG/ML
0.5 INJECTION, SOLUTION SUBCUTANEOUS
Qty: 3 ML | Refills: 2 | Status: SHIPPED | OUTPATIENT
Start: 2025-01-05

## 2025-01-03 RX ORDER — DAPAGLIFLOZIN 5 MG/1
5 TABLET, FILM COATED ORAL DAILY
Qty: 90 TABLET | Refills: 1 | Status: SHIPPED | OUTPATIENT
Start: 2025-01-03 | End: 2026-01-03

## 2025-01-03 ASSESSMENT — PATIENT HEALTH QUESTIONNAIRE - PHQ9
1. LITTLE INTEREST OR PLEASURE IN DOING THINGS: NOT AT ALL
2. FEELING DOWN, DEPRESSED OR HOPELESS: NOT AT ALL
SUM OF ALL RESPONSES TO PHQ9 QUESTIONS 1 AND 2: 0

## 2025-01-03 ASSESSMENT — SLEEP AND FATIGUE QUESTIONNAIRES
HOW LIKELY ARE YOU TO NOD OFF OR FALL ASLEEP IN A CAR, WHILE STOPPED FOR A FEW MINUTES IN TRAFFIC: WOULD NEVER DOZE
HOW LIKELY ARE YOU TO NOD OFF OR FALL ASLEEP WHEN YOU ARE A PASSENGER IN A CAR FOR AN HOUR WITHOUT A BREAK: WOULD NEVER DOZE
HOW LIKELY ARE YOU TO NOD OFF OR FALL ASLEEP WHILE WATCHING TV: SLIGHT CHANCE OF DOZING
HOW LIKELY ARE YOU TO NOD OFF OR FALL ASLEEP WHILE SITTING AND READING: SLIGHT CHANCE OF DOZING
HOW LIKELY ARE YOU TO NOD OFF OR FALL ASLEEP WHILE SITTING QUIETLY AFTER LUNCH WITHOUT ALCOHOL: SLIGHT CHANCE OF DOZING
HOW LIKELY ARE YOU TO NOD OFF OR FALL ASLEEP WHILE SITTING AND TALKING TO SOMEONE: WOULD NEVER DOZE
HOW LIKELY ARE YOU TO NOD OFF OR FALL ASLEEP WHILE LYING DOWN TO REST IN THE AFTERNOON WHEN CIRCUMSTANCES PERMIT: SLIGHT CHANCE OF DOZING
ESS-CHAD TOTAL SCORE: 4
SITING INACTIVE IN A PUBLIC PLACE LIKE A CLASS ROOM OR A MOVIE THEATER: WOULD NEVER DOZE

## 2025-01-03 ASSESSMENT — COLUMBIA-SUICIDE SEVERITY RATING SCALE - C-SSRS
6. HAVE YOU EVER DONE ANYTHING, STARTED TO DO ANYTHING, OR PREPARED TO DO ANYTHING TO END YOUR LIFE?: NO
2. HAVE YOU ACTUALLY HAD ANY THOUGHTS OF KILLING YOURSELF?: NO
1. IN THE PAST MONTH, HAVE YOU WISHED YOU WERE DEAD OR WISHED YOU COULD GO TO SLEEP AND NOT WAKE UP?: NO

## 2025-01-03 ASSESSMENT — PAIN SCALES - GENERAL: PAINLEVEL_OUTOF10: 0-NO PAIN

## 2025-01-06 ENCOUNTER — TELEPHONE (OUTPATIENT)
Dept: SURGERY | Facility: CLINIC | Age: 50
End: 2025-01-06
Payer: COMMERCIAL

## 2025-01-06 NOTE — TELEPHONE ENCOUNTER
Left patient a voicemail. Noticed that patient has not opened her [employer] message from last month so I wasl following up to discuss clearances.

## 2025-01-07 ENCOUNTER — TELEPHONE (OUTPATIENT)
Dept: SURGERY | Facility: CLINIC | Age: 50
End: 2025-01-07
Payer: COMMERCIAL

## 2025-01-07 DIAGNOSIS — Z98.84 BARIATRIC SURGERY STATUS: ICD-10-CM

## 2025-01-07 DIAGNOSIS — E66.01 OBESITY, CLASS III, BMI 40-49.9 (MORBID OBESITY) (MULTI): ICD-10-CM

## 2025-01-07 NOTE — TELEPHONE ENCOUNTER
Outbound call to patient, no answer, left voicemail detailing that her labs are now  and will need to be repeated.  This can be done at any  lab location and they are fasting.  Also, patient needs to follow up with ALBERT Antonio.  Provided number for  Kimberly Rodriguez [phone] opt. 1 opt.1 to call to schedule.    Tamera from [facility] called back stating she could not find any information as to why pt was taking brand myfortic instead of generic. Informed pt's mother of reasoning.

## 2025-01-08 ENCOUNTER — APPOINTMENT (OUTPATIENT)
Dept: BEHAVIORAL HEALTH | Facility: CLINIC | Age: 50
End: 2025-01-08
Payer: COMMERCIAL

## 2025-01-16 ENCOUNTER — OFFICE VISIT (OUTPATIENT)
Dept: PRIMARY CARE | Facility: CLINIC | Age: 50
End: 2025-01-16
Payer: COMMERCIAL

## 2025-01-16 VITALS
HEART RATE: 100 BPM | DIASTOLIC BLOOD PRESSURE: 76 MMHG | TEMPERATURE: 98.4 F | SYSTOLIC BLOOD PRESSURE: 130 MMHG | BODY MASS INDEX: 47.89 KG/M2 | OXYGEN SATURATION: 97 % | WEIGHT: 279 LBS

## 2025-01-16 DIAGNOSIS — R91.1 LUNG NODULE: ICD-10-CM

## 2025-01-16 DIAGNOSIS — R68.89 FLU-LIKE SYMPTOMS: Primary | ICD-10-CM

## 2025-01-16 DIAGNOSIS — Z00.00 ANNUAL PHYSICAL EXAM: ICD-10-CM

## 2025-01-16 DIAGNOSIS — J01.40 ACUTE NON-RECURRENT PANSINUSITIS: ICD-10-CM

## 2025-01-16 DIAGNOSIS — R06.02 SOB (SHORTNESS OF BREATH) ON EXERTION: ICD-10-CM

## 2025-01-16 LAB — POC SARS-COV-2 AG BINAX: NORMAL

## 2025-01-16 PROCEDURE — 4010F ACE/ARB THERAPY RXD/TAKEN: CPT | Performed by: NURSE PRACTITIONER

## 2025-01-16 PROCEDURE — 87811 SARS-COV-2 COVID19 W/OPTIC: CPT | Performed by: NURSE PRACTITIONER

## 2025-01-16 PROCEDURE — 1036F TOBACCO NON-USER: CPT | Performed by: NURSE PRACTITIONER

## 2025-01-16 PROCEDURE — 3078F DIAST BP <80 MM HG: CPT | Performed by: NURSE PRACTITIONER

## 2025-01-16 PROCEDURE — 3075F SYST BP GE 130 - 139MM HG: CPT | Performed by: NURSE PRACTITIONER

## 2025-01-16 PROCEDURE — 99214 OFFICE O/P EST MOD 30 MIN: CPT | Performed by: NURSE PRACTITIONER

## 2025-01-16 RX ORDER — BROMPHENIRAMINE MALEATE, PSEUDOEPHEDRINE HYDROCHLORIDE, AND DEXTROMETHORPHAN HYDROBROMIDE 2; 30; 10 MG/5ML; MG/5ML; MG/5ML
5 SYRUP ORAL 4 TIMES DAILY PRN
Qty: 120 ML | Refills: 0 | Status: SHIPPED | OUTPATIENT
Start: 2025-01-16 | End: 2025-01-26

## 2025-01-16 RX ORDER — ALBUTEROL SULFATE 90 UG/1
2 INHALANT RESPIRATORY (INHALATION) EVERY 6 HOURS PRN
Qty: 6.7 G | Refills: 0 | Status: SHIPPED | OUTPATIENT
Start: 2025-01-16 | End: 2026-01-16

## 2025-01-16 ASSESSMENT — ENCOUNTER SYMPTOMS
FLU SYMPTOMS: 1
FATIGUE: 1
COUGH: 1
DEPRESSION: 0
HEADACHES: 1

## 2025-01-16 NOTE — PROGRESS NOTES
Subjective   Patient ID: Patricia Gibbs is a 49 y.o. female who presents for Follow-up (From er), Shortness of Breath, Cough, Sinusitis, and Conjunctivitis.    Flu Symptoms  This is a new problem. The current episode started in the past 7 days. The problem occurs constantly. The problem has been gradually worsening. Associated symptoms include congestion, coughing, fatigue and headaches. The symptoms are aggravated by exertion and coughing. She has tried drinking and sleep (otc cold) for the symptoms. The treatment provided mild relief.    Presented to the ED with same symptoms, chest tightness. Diagnosed with URI, chest xray with possible nodules  Repeat images when current viral illness resolved, d/w patient  EXAMINATION: XR CHEST PA+LAT 2 VIEWS 01/11/2025 09:54 PM   IMPRESSION:    Linear opacity within the right middle lobe which may represent scarring or atelectasis.   Three nodular opacities within the mid to right lower lung largest measuring 3 mm not definitively seen on the lateral image. Suggest comparison with outside imaging, short-term follow-up imaging or further evaluation with a nonemergent outpatient chest CT.       Anxiety- endorses feels it is getting worse, wakes her from sleep, 4 x in the last year. Discussed management, CBT      Review of Systems   Constitutional:  Positive for fatigue.   HENT:  Positive for congestion.    Respiratory:  Positive for cough.    Neurological:  Positive for headaches.       Objective   /76 (BP Location: Right arm, Patient Position: Sitting)   Pulse 100   Temp 36.9 °C (98.4 °F)   Wt 127 kg (279 lb)   SpO2 97%   BMI 47.89 kg/m²     Physical Exam  Vitals reviewed.   Constitutional:       Appearance: Normal appearance.   HENT:      Head: Normocephalic and atraumatic.   Cardiovascular:      Rate and Rhythm: Normal rate and regular rhythm.   Pulmonary:      Effort: Pulmonary effort is normal. No respiratory distress.      Breath sounds: Normal breath  sounds.   Neurological:      Mental Status: She is alert.         Assessment/Plan   Problem List Items Addressed This Visit             ICD-10-CM    Acute non-recurrent pansinusitis J01.40    SOB (shortness of breath) on exertion R06.02    Relevant Medications    albuterol (ProAir HFA) 90 mcg/actuation inhaler    Annual physical exam Z00.00    Relevant Orders    Magnesium    Flu-like symptoms - Primary R68.89    Relevant Medications    albuterol (ProAir HFA) 90 mcg/actuation inhaler    brompheniramine-pseudoeph-DM 2-30-10 mg/5 mL syrup    Other Relevant Orders    POCT BinaxNOW Covid-19 Ag Card manually resulted (Completed)    XR chest 2 views    Lung nodule R91.1    Relevant Orders    XR chest 2 views

## 2025-01-17 ENCOUNTER — LAB (OUTPATIENT)
Dept: LAB | Facility: LAB | Age: 50
End: 2025-01-17
Payer: COMMERCIAL

## 2025-01-17 DIAGNOSIS — Z00.00 ANNUAL PHYSICAL EXAM: ICD-10-CM

## 2025-01-17 LAB — MAGNESIUM SERPL-MCNC: 1.43 MG/DL (ref 1.6–2.4)

## 2025-01-17 PROCEDURE — 83735 ASSAY OF MAGNESIUM: CPT

## 2025-01-20 DIAGNOSIS — Z00.00 ANNUAL PHYSICAL EXAM: Primary | ICD-10-CM

## 2025-01-20 DIAGNOSIS — E83.42 HYPOMAGNESEMIA: ICD-10-CM

## 2025-01-27 ENCOUNTER — NUTRITION (OUTPATIENT)
Dept: SURGERY | Facility: CLINIC | Age: 50
End: 2025-01-27
Payer: COMMERCIAL

## 2025-01-27 VITALS — BODY MASS INDEX: 45.07 KG/M2 | HEIGHT: 64 IN | WEIGHT: 264 LBS

## 2025-01-27 NOTE — PROGRESS NOTES
S:    pt states that she has not been able to eat much lately.  She has been sick with an upper  respiratory issue.   Pt states that she is practicing the 30-30-30 rule.  She takes about 20-30 min to complete a meal.  She is unsure if she is chewing well or not.        Diet  recall:   B:  protein bar  S:  L:   turkey sandwich  S:  D:   chicken and dumplings, or soup or   S:  Beverages:  >64 oz water/day, 1 c coffee     O:    Wt:  264.0     Ht:  64.0 in             BMI: 45.3    Goal: 5% body weight loss over the course of program    Dietary recommendation:   1. Eliminate caffeinated beverages  2. Continue to practice the 30-30-30 rule by drinking between meals.  3. Have 3 meals and 1 snack daily.  4. Aim for 3-4 oz of protein at each meal.   5. Increase intake of non-starchy vegetables.  Have 5 servings fruits and vegetables daily.   6. Exercise when you are feeling better.     A/P:  pt has maintained her weight from he visit in Nov.  She has not been feeling well in the past 5 weeks. Recommend that she work on exercise when she is feeling better.     Exercise:  none   in past 5 weeks.      Tamia Antonio RD, LD

## 2025-02-04 ENCOUNTER — TELEPHONE (OUTPATIENT)
Dept: SURGERY | Facility: CLINIC | Age: 50
End: 2025-02-04
Payer: COMMERCIAL

## 2025-02-14 DIAGNOSIS — K21.9 GASTROESOPHAGEAL REFLUX DISEASE WITHOUT ESOPHAGITIS: ICD-10-CM

## 2025-02-14 RX ORDER — PANTOPRAZOLE SODIUM 40 MG/1
40 TABLET, DELAYED RELEASE ORAL DAILY
Qty: 90 TABLET | Refills: 1 | Status: SHIPPED | OUTPATIENT
Start: 2025-02-14

## 2025-02-19 ENCOUNTER — HOSPITAL ENCOUNTER (OUTPATIENT)
Dept: RADIOLOGY | Facility: CLINIC | Age: 50
Discharge: HOME | End: 2025-02-19
Payer: COMMERCIAL

## 2025-02-19 ENCOUNTER — APPOINTMENT (OUTPATIENT)
Dept: ORTHOPEDIC SURGERY | Facility: CLINIC | Age: 50
End: 2025-02-19
Payer: COMMERCIAL

## 2025-02-19 VITALS — HEIGHT: 64 IN | BODY MASS INDEX: 45.41 KG/M2 | WEIGHT: 266 LBS

## 2025-02-19 DIAGNOSIS — M21.619 BUNION: ICD-10-CM

## 2025-02-19 DIAGNOSIS — M79.671 RIGHT FOOT PAIN: ICD-10-CM

## 2025-02-19 DIAGNOSIS — E11.610 CHARCOT'S ARTHROPATHY, DIABETIC: ICD-10-CM

## 2025-02-19 DIAGNOSIS — M19.071 ARTHRITIS OF RIGHT MIDFOOT: ICD-10-CM

## 2025-02-19 DIAGNOSIS — E11.42 TYPE 2 DIABETES MELLITUS WITH DIABETIC POLYNEUROPATHY, UNSPECIFIED WHETHER LONG TERM INSULIN USE: Primary | ICD-10-CM

## 2025-02-19 PROCEDURE — 4010F ACE/ARB THERAPY RXD/TAKEN: CPT | Performed by: ORTHOPAEDIC SURGERY

## 2025-02-19 PROCEDURE — 3008F BODY MASS INDEX DOCD: CPT | Performed by: ORTHOPAEDIC SURGERY

## 2025-02-19 PROCEDURE — 73630 X-RAY EXAM OF FOOT: CPT | Mod: RIGHT SIDE | Performed by: RADIOLOGY

## 2025-02-19 PROCEDURE — 1036F TOBACCO NON-USER: CPT | Performed by: ORTHOPAEDIC SURGERY

## 2025-02-19 PROCEDURE — 99204 OFFICE O/P NEW MOD 45 MIN: CPT | Performed by: ORTHOPAEDIC SURGERY

## 2025-02-19 PROCEDURE — 73630 X-RAY EXAM OF FOOT: CPT | Mod: RT

## 2025-02-19 ASSESSMENT — PAIN SCALES - GENERAL: PAINLEVEL_OUTOF10: 4

## 2025-02-19 ASSESSMENT — PAIN DESCRIPTION - DESCRIPTORS: DESCRIPTORS: SHARP;STABBING;SORE

## 2025-02-19 ASSESSMENT — PAIN - FUNCTIONAL ASSESSMENT: PAIN_FUNCTIONAL_ASSESSMENT: 0-10

## 2025-02-19 NOTE — PROGRESS NOTES
49-year-old female here for right foot pain.  Longstanding issue with pain in the right foot for many years.  She works as a banker.  She has had type 2 diabetes for the past 26 years since pregnancy.  She has fallen multiple times over the years.  Has never had surgery.  Has never had custom orthotics or diabetic shoes.  She has difficulty finding shoes that support her foot.  Most of her pains over the lateral border of the foot and over her large bunion deformity.  Non-smoker.    On exam:  WD/WN obese female BMI 45  A+O X3  NAD  No lymphedema  Inspection of both feet and ankles show asymmetric arches.  Palpable spurs dorsal midfoot bilaterally.  Increased heel varus and hallux valgus on the right foot compared to left.  Tender along fifth metatarsal shaft on the right foot.  Elevated second MTP joint.  5/5 strength in all 4 planes.   Sensation grossly intact to LT.   Good pulses.   Stable anterior drawer.  No peroneal subluxation.    (-) Xiomara.     I personally reviewed the following radiographic exams: Weightbearing x-rays of right foot today shows severe midfoot Charcot arthropathy with multiple chronic metatarsal fracture/deformity.  Sclerosis and chronic fracture around fifth metatarsal shaft.  Severe hallux valgus deformity.  Significant second MTP arthrosis with probable [place].    Assessment: Chronic midfoot diabetic Charcot arthropathy right foot with hallux valgus/second claw toe.    Plan: Discussed nonoperative and operative options in detail.   Risk and benefits discussed in detail. All questions answered today.  Recovery timeline and expectations discussed in detail.  Recommend CT scan of the foot for better bony evaluation.  Discussed possible surgical options of midfoot osteotomy/fusion.  Discussed custom orthotics with diabetic shoes as a conservative option to see if we can get her foot in a more supportive environment to decrease her pain nonsurgically.  These will need to be custom  orthotics due to her neuropathic foot and Charcot changes.

## 2025-02-20 LAB
25(OH)D3+25(OH)D2 SERPL-MCNC: 44 NG/ML (ref 30–100)
ALBUMIN SERPL-MCNC: 4.1 G/DL (ref 3.6–5.1)
ALP SERPL-CCNC: 68 U/L (ref 31–125)
ALT SERPL-CCNC: 16 U/L (ref 6–29)
ANION GAP SERPL CALCULATED.4IONS-SCNC: 10 MMOL/L (CALC) (ref 7–17)
AST SERPL-CCNC: 16 U/L (ref 10–35)
BILIRUB SERPL-MCNC: 0.4 MG/DL (ref 0.2–1.2)
BUN SERPL-MCNC: 25 MG/DL (ref 7–25)
CALCIUM SERPL-MCNC: 9.1 MG/DL (ref 8.6–10.2)
CHLORIDE SERPL-SCNC: 103 MMOL/L (ref 98–110)
CO2 SERPL-SCNC: 26 MMOL/L (ref 20–32)
CREAT SERPL-MCNC: 1.12 MG/DL (ref 0.5–0.99)
EGFRCR SERPLBLD CKD-EPI 2021: 60 ML/MIN/1.73M2
ERYTHROCYTE [DISTWIDTH] IN BLOOD BY AUTOMATED COUNT: 14.3 % (ref 11–15)
EST. AVERAGE GLUCOSE BLD GHB EST-MCNC: 186 MG/DL
EST. AVERAGE GLUCOSE BLD GHB EST-SCNC: 10.3 MMOL/L
GLUCOSE SERPL-MCNC: 178 MG/DL (ref 65–99)
HBA1C MFR BLD: 8.1 % OF TOTAL HGB
HCT VFR BLD AUTO: 42 % (ref 35–45)
HGB BLD-MCNC: 13.2 G/DL (ref 11.7–15.5)
MCH RBC QN AUTO: 26.8 PG (ref 27–33)
MCHC RBC AUTO-ENTMCNC: 31.4 G/DL (ref 32–36)
MCV RBC AUTO: 85.2 FL (ref 80–100)
PLATELET # BLD AUTO: 247 THOUSAND/UL (ref 140–400)
PMV BLD REES-ECKER: 11.2 FL (ref 7.5–12.5)
POTASSIUM SERPL-SCNC: 4.9 MMOL/L (ref 3.5–5.3)
PROT SERPL-MCNC: 6.9 G/DL (ref 6.1–8.1)
RBC # BLD AUTO: 4.93 MILLION/UL (ref 3.8–5.1)
SODIUM SERPL-SCNC: 139 MMOL/L (ref 135–146)
TSH SERPL-ACNC: 1.98 MIU/L
WBC # BLD AUTO: 7.8 THOUSAND/UL (ref 3.8–10.8)

## 2025-02-24 ENCOUNTER — TELEPHONE (OUTPATIENT)
Dept: SURGERY | Facility: CLINIC | Age: 50
End: 2025-02-24
Payer: COMMERCIAL

## 2025-02-24 NOTE — TELEPHONE ENCOUNTER
Pt no showed for apt today in virtual apt or by phone call.   LVM for pt to call back to reschedule.

## 2025-03-03 ENCOUNTER — TELEPHONE (OUTPATIENT)
Dept: SURGERY | Facility: CLINIC | Age: 50
End: 2025-03-03
Payer: COMMERCIAL

## 2025-03-04 ENCOUNTER — TELEPHONE (OUTPATIENT)
Dept: SURGERY | Facility: CLINIC | Age: 50
End: 2025-03-04
Payer: COMMERCIAL

## 2025-03-04 NOTE — TELEPHONE ENCOUNTER
Outbound call in an attempt to connect with the patient about her continued interest int he bariatric surgery program.  Patient has had several outbound communication attempted without return communication. Patient has been a NSH for many RD visits. Left a voicemail for the patient expressing our desire to help get her to surgery, but she has a few items left to complete to ensure insurance will pay the claim.  Advised the clearances she does have completed  after 1 year.  Advised that if we do not hear back from her by this time next month we will be under the assumption she is no longer interested.

## 2025-03-06 ENCOUNTER — HOSPITAL ENCOUNTER (OUTPATIENT)
Dept: RADIOLOGY | Facility: HOSPITAL | Age: 50
Discharge: HOME | End: 2025-03-06
Payer: COMMERCIAL

## 2025-03-06 ENCOUNTER — APPOINTMENT (OUTPATIENT)
Dept: RADIOLOGY | Facility: HOSPITAL | Age: 50
End: 2025-03-06
Payer: COMMERCIAL

## 2025-03-06 DIAGNOSIS — M79.671 RIGHT FOOT PAIN: ICD-10-CM

## 2025-03-06 DIAGNOSIS — E11.610 CHARCOT'S ARTHROPATHY, DIABETIC: ICD-10-CM

## 2025-03-06 DIAGNOSIS — M19.071 ARTHRITIS OF RIGHT MIDFOOT: ICD-10-CM

## 2025-03-06 DIAGNOSIS — E11.42 TYPE 2 DIABETES MELLITUS WITH DIABETIC POLYNEUROPATHY, UNSPECIFIED WHETHER LONG TERM INSULIN USE: ICD-10-CM

## 2025-03-06 DIAGNOSIS — M21.619 BUNION: ICD-10-CM

## 2025-03-06 PROCEDURE — 73700 CT LOWER EXTREMITY W/O DYE: CPT | Mod: RT

## 2025-03-10 DIAGNOSIS — E11.9 TYPE 2 DIABETES MELLITUS WITHOUT COMPLICATION, WITHOUT LONG-TERM CURRENT USE OF INSULIN (MULTI): ICD-10-CM

## 2025-03-10 RX ORDER — METFORMIN HYDROCHLORIDE 500 MG/1
1000 TABLET, EXTENDED RELEASE ORAL 2 TIMES DAILY
Qty: 360 TABLET | Refills: 11 | Status: SHIPPED | OUTPATIENT
Start: 2025-03-10

## 2025-05-27 ENCOUNTER — OFFICE VISIT (OUTPATIENT)
Dept: PRIMARY CARE | Facility: CLINIC | Age: 50
End: 2025-05-27
Payer: COMMERCIAL

## 2025-05-27 VITALS
DIASTOLIC BLOOD PRESSURE: 80 MMHG | BODY MASS INDEX: 47.82 KG/M2 | TEMPERATURE: 97.3 F | HEART RATE: 102 BPM | SYSTOLIC BLOOD PRESSURE: 124 MMHG | WEIGHT: 278.6 LBS | OXYGEN SATURATION: 99 %

## 2025-05-27 DIAGNOSIS — Z79.4 TYPE 2 DIABETES MELLITUS WITH HYPERGLYCEMIA, WITH LONG-TERM CURRENT USE OF INSULIN: ICD-10-CM

## 2025-05-27 DIAGNOSIS — E11.65 TYPE 2 DIABETES MELLITUS WITH HYPERGLYCEMIA, WITH LONG-TERM CURRENT USE OF INSULIN: ICD-10-CM

## 2025-05-27 DIAGNOSIS — E11.9 TYPE 2 DIABETES MELLITUS WITHOUT COMPLICATION, WITHOUT LONG-TERM CURRENT USE OF INSULIN: ICD-10-CM

## 2025-05-27 DIAGNOSIS — L97.519 DIABETIC ULCER OF RIGHT GREAT TOE (MULTI): ICD-10-CM

## 2025-05-27 DIAGNOSIS — J40 BRONCHITIS: Primary | ICD-10-CM

## 2025-05-27 DIAGNOSIS — J06.9 UPPER RESPIRATORY TRACT INFECTION, UNSPECIFIED TYPE: ICD-10-CM

## 2025-05-27 DIAGNOSIS — H61.23 EXCESSIVE CERUMEN IN BOTH EAR CANALS: ICD-10-CM

## 2025-05-27 DIAGNOSIS — E11.621 DIABETIC ULCER OF RIGHT GREAT TOE (MULTI): ICD-10-CM

## 2025-05-27 PROCEDURE — 1036F TOBACCO NON-USER: CPT | Performed by: NURSE PRACTITIONER

## 2025-05-27 PROCEDURE — 99214 OFFICE O/P EST MOD 30 MIN: CPT | Performed by: NURSE PRACTITIONER

## 2025-05-27 PROCEDURE — 4010F ACE/ARB THERAPY RXD/TAKEN: CPT | Performed by: NURSE PRACTITIONER

## 2025-05-27 PROCEDURE — 3074F SYST BP LT 130 MM HG: CPT | Performed by: NURSE PRACTITIONER

## 2025-05-27 PROCEDURE — 3079F DIAST BP 80-89 MM HG: CPT | Performed by: NURSE PRACTITIONER

## 2025-05-27 RX ORDER — ALBUTEROL SULFATE 0.83 MG/ML
2.5 SOLUTION RESPIRATORY (INHALATION) ONCE
Status: SHIPPED | OUTPATIENT
Start: 2025-05-27

## 2025-05-27 RX ORDER — AMOXICILLIN AND CLAVULANATE POTASSIUM 875; 125 MG/1; MG/1
875 TABLET, FILM COATED ORAL 2 TIMES DAILY
Qty: 20 TABLET | Refills: 0 | Status: ON HOLD | OUTPATIENT
Start: 2025-05-27 | End: 2025-06-06

## 2025-05-27 RX ORDER — ALBUTEROL SULFATE 90 UG/1
2 INHALANT RESPIRATORY (INHALATION) EVERY 4 HOURS PRN
Qty: 8 G | Refills: 5 | Status: ON HOLD | OUTPATIENT
Start: 2025-05-27 | End: 2026-05-27

## 2025-05-27 RX ORDER — BROMPHENIRAMINE MALEATE, PSEUDOEPHEDRINE HYDROCHLORIDE, AND DEXTROMETHORPHAN HYDROBROMIDE 2; 30; 10 MG/5ML; MG/5ML; MG/5ML
5 SYRUP ORAL 4 TIMES DAILY PRN
Qty: 120 ML | Refills: 0 | Status: ON HOLD | OUTPATIENT
Start: 2025-05-27 | End: 2025-06-06

## 2025-05-27 ASSESSMENT — PATIENT HEALTH QUESTIONNAIRE - PHQ9
1. LITTLE INTEREST OR PLEASURE IN DOING THINGS: NOT AT ALL
SUM OF ALL RESPONSES TO PHQ9 QUESTIONS 1 AND 2: 0
2. FEELING DOWN, DEPRESSED OR HOPELESS: NOT AT ALL

## 2025-05-27 ASSESSMENT — ENCOUNTER SYMPTOMS
DIARRHEA: 0
SINUS PAIN: 1
RHINORRHEA: 1
DEPRESSION: 0
WHEEZING: 1

## 2025-05-27 ASSESSMENT — PAIN SCALES - GENERAL: PAINLEVEL_OUTOF10: 0-NO PAIN

## 2025-05-27 NOTE — PATIENT INSTRUCTIONS
HPI: This is a pleasant 68 yr old male with no major PMH here for evaluation of cardiac history.     Father - had bypass and then  57 yrs old of MI  Brother - had bypass   Mother - lived to 93   P. Uncle  60s of MI  P. Grandmother  of MI  Multiple cousins with heart attacks     Had some CHNUG and had ziopatch with SVT, longest run 14 minutes. Some ventricular ectopy. Had stress echocardiogram, with lots of ventricular ectopy, no chest pain. Borderline size of the aorta at 3.8 cm.     On ROS: no lens dislocation, normal eye-width, normal uvula, normal palate, hypermobility in thumb joints, no skin translucency, normal skin, no vaginal prolapse or hernias, no abnormal wound healing, easy bruising or bleeding, no chest wall deformities, no dental crowding, normal stature.     Has history of chest pressure in the past, especially at night when having to do the firewood. Was having winded sensation as well.     Has hobby farm and does at least 3-4 hours activity in winter and more in the summer     Neuropathy of the feet. Cutting out carbs and sugar as prediabetic. Had ABIs in the past in 2016 that were normal.     Has bulging veins. H/o vein ablation.     ASSESSMENT/PLAN:     1. Chest pressure: CT angiogram of the chest -will look at aorta as well - eval for premature CAD, if positive then invasive cor angiogram was discussed     2. Echocardiogram (stress echo only did limited views) - evaluate for underlying cardiomyopathy given history     3. Chronic vein insufficiency: obtain vein comp study     4. Claudication vs neuropathic pain - DARINEL with exercise for leg pain, do not suspect severe PAD. OK with compression stockings    5. SVT - monitior, likely start beta blocker in future if sxs and depending on results above     Follow up with ARISTIDES in 2 months     Abbey Ortega MD MSC  M [facility]      PAST MEDICAL HISTORY  No major cardiac PMH    CURRENT MEDICATIONS  Current Outpatient Medications  A prescription was sent to your pharmacy. Your pharmacist can answer any questions or concerns you may have or you may call the office.    Wound Clinic [phone]    Common Cold  Usually starts 3-5 days after exposure and lasts about 10 days with day 3-5 the worst.   The cough may linger a little longer  Symptoms include Sneezing, Stuffy or Runny Nose, Sore Throat, Cough from post nasal drip, Watery Eyes, Sometimes fever    Treatment  Tylenol for aches and pains, fever  Children under 3 months should not take Tylenol, under 6 months should not take Ibuprofen or if dehydrated  Warm salt water gargles for throat discomfort  Lots of Fluids such as tea with honey, soup, broth, water, Electrolyte replacement drinks  Rest      Preventing Infection    Wash your hands. Wash your hands thoroughly and often with soap and water for at least 20 seconds. If soap and water aren't available, use an alcohol-based hand  that contains at least 60% alcohol. Teach your children the importance of hand-washing. Avoid touching your eyes, nose or mouth with unwashed hands.    Disinfect your stuff. Clean and disinfect high-touch surfaces, such as doorknobs, light switches, electronics, and kitchen and bathroom countertops daily. This is especially important when someone in your family has a cold. Wash children's toys periodically.    Cover your cough. Sneeze and cough into tissues. Throw away used tissues right away, then wash your hands thoroughly. If you don't have a tissue, sneeze or cough into the bend of your elbow and then wash your hands.    Don't share. Don't share drinking glasses or eating utensils with other family members. Use your own glass or disposable cups when you or someone else is sick. Label the cup or glass with the name of the person using it.    Stay away from people with colds. Avoid close contact with anyone who has a cold. Stay out of crowds, when possible. Avoid touching your eyes, nose and    Medication Sig Dispense Refill     albuterol (PROAIR HFA/PROVENTIL HFA/VENTOLIN HFA) 108 (90 Base) MCG/ACT inhaler Inhale 2 puffs into the lungs every 6 hours as needed for shortness of breath, wheezing or cough 18 g 3     chlorthalidone (HYGROTON) 25 MG tablet Take 1 tablet (25 mg) by mouth daily 90 tablet 2     clobetasol (TEMOVATE) 0.05 % external ointment Apply topically daily as needed For itching 60 g 0     cyclobenzaprine (FLEXERIL) 10 MG tablet Take 1 tablet (10 mg) by mouth 3 times daily as needed for muscle spasms 15 tablet 0     FLUoxetine (PROZAC) 20 MG capsule Take 1 capsule (20 mg) by mouth daily 90 capsule 0     gabapentin (NEURONTIN) 300 MG capsule TAKE 1 CAPSULE THREE TIMES A  capsule 3     losartan (COZAAR) 25 MG tablet Take 1 tablet (25 mg) by mouth daily Hold until patient calls for refill. 90 tablet 3     omeprazole (PRILOSEC) 40 MG DR capsule TAKE 1 CAPSULE DAILY 30 TO 60 MINUTES BEFORE A MEAL 90 capsule 3     simvastatin (ZOCOR) 20 MG tablet Take 1 tablet (20 mg) by mouth At Bedtime Hold until patient calls for refill. 90 tablet 3       PAST SURGICAL HISTORY:  Past Surgical History:   Procedure Laterality Date     ARTHROSCOPY KNEE Bilateral     both knees with arthroscopy in the past.      ARTHROSCOPY KNEE       ARTHROSCOPY SHOULDER ROTATOR CUFF REPAIR       AS TOTAL KNEE ARTHROPLASTY Bilateral     Both total knees.      LENGTHEN TENDON ACHILLES Left      REPAIR TENDON ACHILLES      1980s two separate surgeries.     ROTATOR CUFF REPAIR RT/LT Left      [facility] TOTAL KNEE ARTHROPLASTY Right 3/17/2015    Procedure: RIGHT KNEE TOTAL ARTHROPLASTY;  Surgeon: Jaiden Barnes MD;  Location: [facility] OR;  Service: Orthopedics     [facility] TOTAL KNEE ARTHROPLASTY Left 6/30/2015    Procedure: LEFT KNEE TOTAL ARTHROPLASTY;  Surgeon: Jaiden Barnes MD;  Location: [facility] OR;  Service: Orthopedics       ALLERGIES     Allergies   Allergen Reactions     Amlodipine Swelling     Cefzil  mouth.  Review your  center's policies. Look for a  setting with good hygiene practices and clear policies about keeping sick children at home.    Take care of yourself. Eating well and getting exercise and enough sleep is good for your overall health.     [Cefprozil] Hives and Itching     Darvocet [Propoxyphene N-Apap] Hives     Penicillins Hives       FAMILY HISTORY  Family History   Problem Relation Age of Onset     Coronary Artery Disease Father      Hyperlipidemia Mother      Prostate Cancer No family hx of      Colon Cancer No family hx of      Hypertension Mother      Heart Disease Mother      Heart Disease Father      Hypertension Sister      Cancer Brother      No Known Problems Daughter      No Known Problems Son      Cancer Maternal Grandmother      Vision Loss Maternal Grandmother      No Known Problems Sister      Cancer Brother      Hypertension Brother      Hypertension Brother      Cancer Brother      Sleep Apnea Brother      No Known Problems Brother      No Known Problems Daughter            SOCIAL HISTORY  Social History     Socioeconomic History     Marital status: Single     Spouse name: Not on file     Number of children: Not on file     Years of education: Not on file     Highest education level: Not on file   Occupational History     Not on file   Tobacco Use     Smoking status: Former     Packs/day: 1.00     Years: 30.00     Pack years: 30.00     Types: Cigarettes     Quit date: 2011     Years since quittin.1     Smokeless tobacco: Never     Tobacco comments:     started at age 22   Vaping Use     Vaping status: Never Used   Substance and Sexual Activity     Alcohol use: Yes     Drug use: No     Sexual activity: Yes     Partners: Female   Other Topics Concern     Parent/sibling w/ CABG, MI or angioplasty before 65F 55M? Not Asked   Social History Narrative     Not on file     Social Determinants of Health     Financial Resource Strain: Not on file   Food Insecurity: Not on file   Transportation Needs: Not on file   Physical Activity: Not on file   Stress: Not on file   Social Connections: Not on file   Intimate Partner Violence: Not on file   Housing Stability: Not on file       ROS:   Constitutional: No fever, chills, or sweats. No  weight gain/loss   ENT: No visual disturbance, ear ache, epistaxis, sore throat  Allergies/Immunologic: Negative  Respiratory: No cough, hemoptysia  Cardiovascular: As per HPI  GI: No nausea, vomiting, hematemesis, melena, or hematochezia  : No urinary frequency, dysuria, or hematuria  Integument: Negative  Psychiatric: Negative  Neuro: Negative  Endocrinology: Negative   Musculoskeletal: Negative  Vascular: No walking impairment, claudication, ischemic rest pain or nonhealing wounds    EXAM:  /73 (BP Location: Right arm, Patient Position: Sitting, Cuff Size: Adult Large)   Pulse 56   Wt 138.8 kg (306 lb)   SpO2 97%   BMI 44.22 kg/m    In general, the patient is a pleasant male in no apparent distress.    HEENT: NC/AT.  PERRLA.  EOMI.  Sclerae white, not injected.  Nares clear.  Pharynx without erythema or exudate.  Dentition intact.    Neck: No adenopathy.  No thyromegaly. Carotids +2/2 bilaterally without bruits.  No jugular venous distension.   Heart: RRR. Normal S1, S2 splits physiologically. No murmur, rub, click, or gallop. The PMI is in the 5th ICS in the midclavicular line. There is no heave.    Lungs: CTA.  No ronchi, wheezes, rales.  No dullness to percussion.   Abdomen: Soft, nontender, nondistended. No organomegaly. No AAA.  No bruits.   Extremities: No clubbing, cyanosis, or edema.  No wounds. No varicose veins signs of chronic venous insufficiency.   Vascular: No bruits are noted.    Labs:  LIPID RESULTS:  Lab Results   Component Value Date    CHOL 160 01/21/2023    CHOL 158 02/18/2021    HDL 46 01/21/2023    HDL 46 02/18/2021    LDL 89 01/21/2023    LDL 76 02/18/2021    TRIG 125 01/21/2023    TRIG 181 (H) 02/18/2021    NHDL 114 01/21/2023    NHDL 112 02/18/2021       LIVER ENZYME RESULTS:  Lab Results   Component Value Date    AST 32 12/28/2022    AST 28 06/08/2018    ALT 38 12/28/2022    ALT 45 06/08/2018       CBC RESULTS:  Lab Results   Component Value Date    WBC 7.3 12/28/2022    WBC  6.4 05/08/2017    RBC 4.84 12/28/2022    RBC 4.97 05/08/2017    HGB 14.7 12/28/2022    HGB 14.9 05/08/2017    HCT 43.1 12/28/2022    HCT 43.1 05/08/2017    MCV 89 12/28/2022    MCV 87 05/08/2017    MCH 30.4 12/28/2022    MCH 30.0 05/08/2017    MCHC 34.1 12/28/2022    MCHC 34.6 05/08/2017    RDW 12.8 12/28/2022    RDW 13.0 05/08/2017     12/28/2022     05/08/2017       BMP RESULTS:  Lab Results   Component Value Date     (L) 12/28/2022     02/18/2021    POTASSIUM 3.5 12/28/2022    POTASSIUM 3.2 (L) 02/23/2022    POTASSIUM 3.3 (L) 02/18/2021    CHLORIDE 99 12/28/2022    CHLORIDE 101 02/23/2022    CHLORIDE 104 02/18/2021    CO2 25 12/28/2022    CO2 32 02/23/2022    CO2 28 02/18/2021    ANIONGAP 10 12/28/2022    ANIONGAP 2 (L) 02/23/2022    ANIONGAP 6 02/18/2021    GLC 99 12/28/2022     (H) 02/23/2022    GLC 91 02/18/2021    BUN 14.7 12/28/2022    BUN 16 02/23/2022    BUN 18 02/18/2021    CR 0.75 12/28/2022    CR 0.79 02/18/2021    GFRESTIMATED >90 12/28/2022    GFRESTIMATED >90 02/18/2021    GFRESTBLACK >90 02/18/2021    KIMBERLY 9.5 12/28/2022    KIMBERLY 9.1 02/18/2021        A1C RESULTS:  Lab Results   Component Value Date    A1C 5.6 03/07/2017

## 2025-05-27 NOTE — PROGRESS NOTES
"Subjective   Patient ID: Patricia Gibbs is a 49 y.o. female who presents for Sick Visit.    URI   This is a new problem. The current episode started 1 to 4 weeks ago (day 8). The problem has been gradually worsening. There has been no fever. Associated symptoms include a plugged ear sensation, rhinorrhea, sinus pain and wheezing. Pertinent negatives include no diarrhea, ear pain or sneezing. She has tried inhaler use, antihistamine and NSAIDs (DayQuil) for the symptoms. The treatment provided mild relief.      Wound- right great toe noted blister 2 weeks ago after \"wearing the wrong shoes\". Now with open area, draining pus, tender with mild swelling. Able to bear weight. Has been treating with Neosporin. She is a diabetic    Review of Systems   HENT:  Positive for rhinorrhea and sinus pain. Negative for ear pain and sneezing.    Respiratory:  Positive for wheezing.    Gastrointestinal:  Negative for diarrhea.       Objective   /80 (BP Location: Left arm, Patient Position: Sitting)   Pulse 102   Temp 36.3 °C (97.3 °F) (Temporal)   Wt 126 kg (278 lb 9.6 oz)   SpO2 99%   BMI 47.82 kg/m²     Physical Exam  Vitals reviewed.   Constitutional:       Appearance: She is obese.   HENT:      Head: Normocephalic and atraumatic.      Ears:      Comments: Excess cerumen bilateral     Nose:      Right Turbinates: Enlarged and swollen.      Left Turbinates: Enlarged and swollen.      Right Sinus: Maxillary sinus tenderness present.      Left Sinus: Maxillary sinus tenderness present.      Mouth/Throat:      Pharynx: Uvula midline. Posterior oropharyngeal erythema and postnasal drip present. No uvula swelling.   Cardiovascular:      Rate and Rhythm: Normal rate and regular rhythm.   Pulmonary:      Effort: Respiratory distress present.      Breath sounds: Rhonchi present. No wheezing.   Musculoskeletal:      Cervical back: Normal range of motion and neck supple.   Skin:     Findings: Erythema present.      Comments: " Diabetic ulcer approx 1inch round, medial right great toe at bunion   Neurological:      Mental Status: She is alert.         Assessment/Plan   Problem List Items Addressed This Visit           ICD-10-CM    Diabetes mellitus (Multi) E11.9    Bronchitis - Primary J40    Relevant Medications    albuterol 2.5 mg /3 mL (0.083 %) nebulizer solution 2.5 mg    amoxicillin-clavulanate (Augmentin) 875-125 mg tablet    albuterol (Ventolin HFA) 90 mcg/actuation inhaler    brompheniramine-pseudoeph-DM 2-30-10 mg/5 mL syrup    Diabetic ulcer of right great toe (Multi) E11.621, L97.519    Relevant Orders    Referral to Wound Clinic     Other Visit Diagnoses         Codes      Upper respiratory tract infection, unspecified type     J06.9    Relevant Medications    amoxicillin-clavulanate (Augmentin) 875-125 mg tablet    albuterol (Ventolin HFA) 90 mcg/actuation inhaler    brompheniramine-pseudoeph-DM 2-30-10 mg/5 mL syrup      Excessive cerumen in both ear canals     H61.23    Relevant Medications    carbamide peroxide (Debrox) 6.5 % otic solution

## 2025-05-28 ENCOUNTER — OFFICE VISIT (OUTPATIENT)
Dept: WOUND CARE | Facility: CLINIC | Age: 50
End: 2025-05-28
Payer: COMMERCIAL

## 2025-05-28 ENCOUNTER — APPOINTMENT (OUTPATIENT)
Dept: RADIOLOGY | Facility: HOSPITAL | Age: 50
DRG: 629 | End: 2025-05-28
Payer: COMMERCIAL

## 2025-05-28 ENCOUNTER — HOSPITAL ENCOUNTER (INPATIENT)
Facility: HOSPITAL | Age: 50
End: 2025-05-28
Attending: EMERGENCY MEDICINE | Admitting: INTERNAL MEDICINE
Payer: COMMERCIAL

## 2025-05-28 DIAGNOSIS — M86.9 OSTEOMYELITIS OF FIFTH TOE OF RIGHT FOOT (MULTI): ICD-10-CM

## 2025-05-28 DIAGNOSIS — L03.031 CELLULITIS OF GREAT TOE OF RIGHT FOOT: Primary | ICD-10-CM

## 2025-05-28 DIAGNOSIS — L03.90 CELLULITIS: Primary | ICD-10-CM

## 2025-05-28 DIAGNOSIS — J40 BRONCHITIS: ICD-10-CM

## 2025-05-28 DIAGNOSIS — E11.621 DIABETIC ULCER OF TOE OF RIGHT FOOT ASSOCIATED WITH TYPE 2 DIABETES MELLITUS, WITH FAT LAYER EXPOSED: ICD-10-CM

## 2025-05-28 DIAGNOSIS — L97.512 DIABETIC ULCER OF TOE OF RIGHT FOOT ASSOCIATED WITH TYPE 2 DIABETES MELLITUS, WITH FAT LAYER EXPOSED: ICD-10-CM

## 2025-05-28 DIAGNOSIS — M86.171 ACUTE OSTEOMYELITIS OF RIGHT FOOT (MULTI): ICD-10-CM

## 2025-05-28 LAB
ALBUMIN SERPL BCP-MCNC: 4.1 G/DL (ref 3.4–5)
ALP SERPL-CCNC: 77 U/L (ref 33–110)
ALT SERPL W P-5'-P-CCNC: 12 U/L (ref 7–45)
ANION GAP SERPL CALC-SCNC: 17 MMOL/L (ref 10–20)
AST SERPL W P-5'-P-CCNC: 11 U/L (ref 9–39)
BASOPHILS # BLD AUTO: 0.03 X10*3/UL (ref 0–0.1)
BASOPHILS NFR BLD AUTO: 0.2 %
BILIRUB SERPL-MCNC: 0.5 MG/DL (ref 0–1.2)
BUN SERPL-MCNC: 20 MG/DL (ref 6–23)
CALCIUM SERPL-MCNC: 9.5 MG/DL (ref 8.6–10.3)
CHLORIDE SERPL-SCNC: 100 MMOL/L (ref 98–107)
CO2 SERPL-SCNC: 23 MMOL/L (ref 21–32)
CREAT SERPL-MCNC: 1.45 MG/DL (ref 0.5–1.05)
CRP SERPL-MCNC: 29.35 MG/DL
EGFRCR SERPLBLD CKD-EPI 2021: 44 ML/MIN/1.73M*2
EOSINOPHIL # BLD AUTO: 0.07 X10*3/UL (ref 0–0.7)
EOSINOPHIL NFR BLD AUTO: 0.5 %
ERYTHROCYTE [DISTWIDTH] IN BLOOD BY AUTOMATED COUNT: 14.2 % (ref 11.5–14.5)
ERYTHROCYTE [SEDIMENTATION RATE] IN BLOOD BY WESTERGREN METHOD: 70 MM/H (ref 0–20)
GLUCOSE SERPL-MCNC: 187 MG/DL (ref 74–99)
HCT VFR BLD AUTO: 41.7 % (ref 36–46)
HGB BLD-MCNC: 12.9 G/DL (ref 12–16)
IMM GRANULOCYTES # BLD AUTO: 0.06 X10*3/UL (ref 0–0.7)
IMM GRANULOCYTES NFR BLD AUTO: 0.4 % (ref 0–0.9)
LACTATE SERPL-SCNC: 0.9 MMOL/L (ref 0.4–2)
LYMPHOCYTES # BLD AUTO: 1.43 X10*3/UL (ref 1.2–4.8)
LYMPHOCYTES NFR BLD AUTO: 10.3 %
MCH RBC QN AUTO: 26.9 PG (ref 26–34)
MCHC RBC AUTO-ENTMCNC: 30.9 G/DL (ref 32–36)
MCV RBC AUTO: 87 FL (ref 80–100)
MONOCYTES # BLD AUTO: 0.88 X10*3/UL (ref 0.1–1)
MONOCYTES NFR BLD AUTO: 6.4 %
NEUTROPHILS # BLD AUTO: 11.36 X10*3/UL (ref 1.2–7.7)
NEUTROPHILS NFR BLD AUTO: 82.2 %
NRBC BLD-RTO: 0 /100 WBCS (ref 0–0)
PLATELET # BLD AUTO: 228 X10*3/UL (ref 150–450)
POTASSIUM SERPL-SCNC: 4 MMOL/L (ref 3.5–5.3)
PROT SERPL-MCNC: 8.1 G/DL (ref 6.4–8.2)
RBC # BLD AUTO: 4.8 X10*6/UL (ref 4–5.2)
SODIUM SERPL-SCNC: 136 MMOL/L (ref 136–145)
WBC # BLD AUTO: 13.8 X10*3/UL (ref 4.4–11.3)

## 2025-05-28 PROCEDURE — 85652 RBC SED RATE AUTOMATED: CPT

## 2025-05-28 PROCEDURE — 96375 TX/PRO/DX INJ NEW DRUG ADDON: CPT

## 2025-05-28 PROCEDURE — 85025 COMPLETE CBC W/AUTO DIFF WBC: CPT

## 2025-05-28 PROCEDURE — 99214 OFFICE O/P EST MOD 30 MIN: CPT

## 2025-05-28 PROCEDURE — 2500000004 HC RX 250 GENERAL PHARMACY W/ HCPCS (ALT 636 FOR OP/ED): Mod: JZ | Performed by: EMERGENCY MEDICINE

## 2025-05-28 PROCEDURE — 73660 X-RAY EXAM OF TOE(S): CPT | Mod: RT

## 2025-05-28 PROCEDURE — 73660 X-RAY EXAM OF TOE(S): CPT | Mod: RIGHT SIDE | Performed by: RADIOLOGY

## 2025-05-28 PROCEDURE — 86140 C-REACTIVE PROTEIN: CPT

## 2025-05-28 PROCEDURE — 1200000002 HC GENERAL ROOM WITH TELEMETRY DAILY

## 2025-05-28 PROCEDURE — 99285 EMERGENCY DEPT VISIT HI MDM: CPT | Mod: 25 | Performed by: EMERGENCY MEDICINE

## 2025-05-28 PROCEDURE — 96365 THER/PROPH/DIAG IV INF INIT: CPT

## 2025-05-28 PROCEDURE — 83605 ASSAY OF LACTIC ACID: CPT

## 2025-05-28 PROCEDURE — 36415 COLL VENOUS BLD VENIPUNCTURE: CPT

## 2025-05-28 PROCEDURE — 73630 X-RAY EXAM OF FOOT: CPT | Mod: RIGHT SIDE | Performed by: RADIOLOGY

## 2025-05-28 PROCEDURE — 73630 X-RAY EXAM OF FOOT: CPT | Mod: RT

## 2025-05-28 PROCEDURE — 80053 COMPREHEN METABOLIC PANEL: CPT

## 2025-05-28 PROCEDURE — 2500000005 HC RX 250 GENERAL PHARMACY W/O HCPCS: Performed by: EMERGENCY MEDICINE

## 2025-05-28 PROCEDURE — 71046 X-RAY EXAM CHEST 2 VIEWS: CPT | Performed by: STUDENT IN AN ORGANIZED HEALTH CARE EDUCATION/TRAINING PROGRAM

## 2025-05-28 PROCEDURE — 87040 BLOOD CULTURE FOR BACTERIA: CPT | Mod: PARLAB

## 2025-05-28 PROCEDURE — 71046 X-RAY EXAM CHEST 2 VIEWS: CPT

## 2025-05-28 RX ORDER — POLYETHYLENE GLYCOL 3350 17 G/17G
17 POWDER, FOR SOLUTION ORAL DAILY PRN
Status: ACTIVE | OUTPATIENT
Start: 2025-05-28

## 2025-05-28 RX ORDER — ENOXAPARIN SODIUM 100 MG/ML
40 INJECTION SUBCUTANEOUS EVERY 12 HOURS SCHEDULED
Status: DISCONTINUED | OUTPATIENT
Start: 2025-05-29 | End: 2025-05-30

## 2025-05-28 RX ADMIN — VANCOMYCIN HYDROCHLORIDE 2000 MG: 10 INJECTION, POWDER, LYOPHILIZED, FOR SOLUTION INTRAVENOUS at 23:38

## 2025-05-28 RX ADMIN — PIPERACILLIN SODIUM AND TAZOBACTAM SODIUM 3.38 G: 3; .375 INJECTION, SOLUTION INTRAVENOUS at 22:43

## 2025-05-28 ASSESSMENT — PAIN DESCRIPTION - FREQUENCY: FREQUENCY: CONSTANT/CONTINUOUS

## 2025-05-28 ASSESSMENT — COLUMBIA-SUICIDE SEVERITY RATING SCALE - C-SSRS
2. HAVE YOU ACTUALLY HAD ANY THOUGHTS OF KILLING YOURSELF?: NO
6. HAVE YOU EVER DONE ANYTHING, STARTED TO DO ANYTHING, OR PREPARED TO DO ANYTHING TO END YOUR LIFE?: NO
1. IN THE PAST MONTH, HAVE YOU WISHED YOU WERE DEAD OR WISHED YOU COULD GO TO SLEEP AND NOT WAKE UP?: NO

## 2025-05-28 ASSESSMENT — PAIN DESCRIPTION - DESCRIPTORS: DESCRIPTORS: ACHING

## 2025-05-28 ASSESSMENT — PAIN DESCRIPTION - ORIENTATION: ORIENTATION: RIGHT

## 2025-05-28 ASSESSMENT — PAIN DESCRIPTION - LOCATION: LOCATION: FOOT

## 2025-05-28 ASSESSMENT — PAIN - FUNCTIONAL ASSESSMENT: PAIN_FUNCTIONAL_ASSESSMENT: 0-10

## 2025-05-28 ASSESSMENT — PAIN SCALES - GENERAL: PAINLEVEL_OUTOF10: 5 - MODERATE PAIN

## 2025-05-28 ASSESSMENT — PAIN DESCRIPTION - PAIN TYPE: TYPE: ACUTE PAIN

## 2025-05-28 NOTE — ED NOTES
Pt states that 3 weeks ago she had a blister that got infected on her right foot. Pt was seen by wound care today and was told to come and get checked out and IV abx. Denies any fevers. States that the blister is from wearing the wrong shoes and is on her bunion. Pt has hx of diabetes with neuropathy.     Virginia Escobedo RN  05/28/25 1926

## 2025-05-28 NOTE — ED TRIAGE NOTES
Continued Stay SW/CM Assessment/Plan of Care Note       Active Substitute Decision Maker (SDM)       Zena Chase Surrogate Primary Contact - Spouse   Primary Phone: [phone] (Work)                     Progress note:  [place] to see once pt off vent and stable. CM/SW to screen once off vent     See SW/CM flowsheets for other objective data.    Disposition Recommendations:  Preliminary discharge destination: Planned Discharge Destination: Location not determined at this time  SW/CM recommendation for discharge:      Barriers to Discharge  Identified Barriers to Discharge/Transition Planning: Medical necessity for acute care (vent managment-withdrawal protocol, potassium, phenobarb, restraints)                   The patient was seen and examined in triage.    History of Present Illness: The patient is a 49-year-old female presenting to the emergency department from home for wound evaluation. She was sent by Dr. Eden from the wound clinic for possible admission and IV antibiotics due to right foot wound infection/cellulitis.  Patient reports approximately 3 weeks ago she developed a blister on her right foot from wearing the wrong shoes with her bunion.  She has a history of diabetes with peripheral neuropathy.  Denies fevers, chills, nausea, or vomiting.  States she was also seen by her PCP yesterday and started on amoxicillin, given albuterol inhaler, and received a DuoNeb in office for suspected bronchitis as she has had a nonproductive cough and shortness of breath for the past week as well.    Brief Physical Exam:  Exam is limited by the patient sitting in a chair in triage.   Heart: Tachycardic and regular rhythm.   Lungs: Clear to auscultation bilaterally.   Abdomen: Soft, nondistended, nontender.     Plan: Appropriate labs and diagnostic imaging were ordered.      For the remainder of the patient's workup and ED course, please refer to the main ED provider note. We discussed need for diagnostic testing including laboratory studies and imaging.  We also discussed that they may be asked to wait in the waiting room while these tests are pending.  They understand that if they choose to leave without having the testing completed or resulted that we cannot rule out acute life threatening illnesses and the risks involved could lead to worsening condition, permanent disability or even death.      Disclaimer: This note was dictated by speech recognition. Minor errors in transcription may be present. Please call if questions.

## 2025-05-29 ENCOUNTER — SURGERY (OUTPATIENT)
Age: 50
End: 2025-05-29
Payer: COMMERCIAL

## 2025-05-29 ENCOUNTER — APPOINTMENT (OUTPATIENT)
Dept: RADIOLOGY | Facility: HOSPITAL | Age: 50
DRG: 629 | End: 2025-05-29
Payer: COMMERCIAL

## 2025-05-29 VITALS — OXYGEN SATURATION: 97 % | SYSTOLIC BLOOD PRESSURE: 139 MMHG | DIASTOLIC BLOOD PRESSURE: 75 MMHG | TEMPERATURE: 96.3 F

## 2025-05-29 PROBLEM — D72.829 LEUKOCYTOSIS: Status: ACTIVE | Noted: 2025-05-29

## 2025-05-29 PROBLEM — M86.171 ACUTE OSTEOMYELITIS OF RIGHT FOOT (MULTI): Status: ACTIVE | Noted: 2025-05-28

## 2025-05-29 PROBLEM — E11.9 DIABETES (MULTI): Status: ACTIVE | Noted: 2025-05-29

## 2025-05-29 PROBLEM — R79.82 ELEVATED C-REACTIVE PROTEIN (CRP): Status: ACTIVE | Noted: 2025-05-29

## 2025-05-29 PROBLEM — N17.9 AKI (ACUTE KIDNEY INJURY): Status: ACTIVE | Noted: 2025-05-29

## 2025-05-29 LAB
ANION GAP SERPL CALC-SCNC: 14 MMOL/L (ref 10–20)
BUN SERPL-MCNC: 23 MG/DL (ref 6–23)
CALCIUM SERPL-MCNC: 9 MG/DL (ref 8.6–10.3)
CHLORIDE SERPL-SCNC: 103 MMOL/L (ref 98–107)
CO2 SERPL-SCNC: 24 MMOL/L (ref 21–32)
CREAT SERPL-MCNC: 1.37 MG/DL (ref 0.5–1.05)
EGFRCR SERPLBLD CKD-EPI 2021: 47 ML/MIN/1.73M*2
ERYTHROCYTE [DISTWIDTH] IN BLOOD BY AUTOMATED COUNT: 14.4 % (ref 11.5–14.5)
GLUCOSE BLD MANUAL STRIP-MCNC: 164 MG/DL (ref 74–99)
GLUCOSE BLD MANUAL STRIP-MCNC: 165 MG/DL (ref 74–99)
GLUCOSE BLD MANUAL STRIP-MCNC: 177 MG/DL (ref 74–99)
GLUCOSE BLD MANUAL STRIP-MCNC: 189 MG/DL (ref 74–99)
GLUCOSE BLD MANUAL STRIP-MCNC: 253 MG/DL (ref 74–99)
GLUCOSE SERPL-MCNC: 240 MG/DL (ref 74–99)
HCG UR QL IA.RAPID: NEGATIVE
HCT VFR BLD AUTO: 39 % (ref 36–46)
HGB BLD-MCNC: 12.4 G/DL (ref 12–16)
MCH RBC QN AUTO: 27.5 PG (ref 26–34)
MCHC RBC AUTO-ENTMCNC: 31.8 G/DL (ref 32–36)
MCV RBC AUTO: 87 FL (ref 80–100)
NRBC BLD-RTO: 0 /100 WBCS (ref 0–0)
PLATELET # BLD AUTO: 218 X10*3/UL (ref 150–450)
POTASSIUM SERPL-SCNC: 4.2 MMOL/L (ref 3.5–5.3)
RBC # BLD AUTO: 4.51 X10*6/UL (ref 4–5.2)
SODIUM SERPL-SCNC: 137 MMOL/L (ref 136–145)
WBC # BLD AUTO: 8.4 X10*3/UL (ref 4.4–11.3)

## 2025-05-29 PROCEDURE — 87205 SMEAR GRAM STAIN: CPT | Mod: PARLAB

## 2025-05-29 PROCEDURE — 85027 COMPLETE CBC AUTOMATED: CPT | Performed by: INTERNAL MEDICINE

## 2025-05-29 PROCEDURE — 2500000001 HC RX 250 WO HCPCS SELF ADMINISTERED DRUGS (ALT 637 FOR MEDICARE OP)

## 2025-05-29 PROCEDURE — A9575 INJ GADOTERATE MEGLUMI 0.1ML: HCPCS | Performed by: INTERNAL MEDICINE

## 2025-05-29 PROCEDURE — 87070 CULTURE OTHR SPECIMN AEROBIC: CPT | Mod: PARLAB

## 2025-05-29 PROCEDURE — 73720 MRI LWR EXTREMITY W/O&W/DYE: CPT | Mod: RT

## 2025-05-29 PROCEDURE — 1200000002 HC GENERAL ROOM WITH TELEMETRY DAILY

## 2025-05-29 PROCEDURE — 36415 COLL VENOUS BLD VENIPUNCTURE: CPT | Performed by: INTERNAL MEDICINE

## 2025-05-29 PROCEDURE — 82947 ASSAY GLUCOSE BLOOD QUANT: CPT

## 2025-05-29 PROCEDURE — 2550000001 HC RX 255 CONTRASTS: Performed by: INTERNAL MEDICINE

## 2025-05-29 PROCEDURE — 87077 CULTURE AEROBIC IDENTIFY: CPT | Mod: PARLAB

## 2025-05-29 PROCEDURE — 81025 URINE PREGNANCY TEST: CPT

## 2025-05-29 PROCEDURE — 94640 AIRWAY INHALATION TREATMENT: CPT

## 2025-05-29 PROCEDURE — 2500000002 HC RX 250 W HCPCS SELF ADMINISTERED DRUGS (ALT 637 FOR MEDICARE OP, ALT 636 FOR OP/ED)

## 2025-05-29 PROCEDURE — 73720 MRI LWR EXTREMITY W/O&W/DYE: CPT | Mod: RIGHT SIDE | Performed by: STUDENT IN AN ORGANIZED HEALTH CARE EDUCATION/TRAINING PROGRAM

## 2025-05-29 PROCEDURE — 2500000004 HC RX 250 GENERAL PHARMACY W/ HCPCS (ALT 636 FOR OP/ED): Mod: JZ | Performed by: INTERNAL MEDICINE

## 2025-05-29 PROCEDURE — 2500000004 HC RX 250 GENERAL PHARMACY W/ HCPCS (ALT 636 FOR OP/ED)

## 2025-05-29 PROCEDURE — 80048 BASIC METABOLIC PNL TOTAL CA: CPT | Performed by: INTERNAL MEDICINE

## 2025-05-29 RX ORDER — TRAMADOL HYDROCHLORIDE 50 MG/1
50 TABLET, FILM COATED ORAL EVERY 6 HOURS PRN
Status: DISCONTINUED | OUTPATIENT
Start: 2025-05-29 | End: 2025-05-30

## 2025-05-29 RX ORDER — DAPAGLIFLOZIN 5 MG/1
5 TABLET, FILM COATED ORAL DAILY
Status: DISPENSED | OUTPATIENT
Start: 2025-05-29

## 2025-05-29 RX ORDER — LISINOPRIL 10 MG/1
10 TABLET ORAL DAILY
Status: DISPENSED | OUTPATIENT
Start: 2025-05-29

## 2025-05-29 RX ORDER — KETOROLAC TROMETHAMINE 30 MG/ML
15 INJECTION, SOLUTION INTRAMUSCULAR; INTRAVENOUS ONCE
Status: COMPLETED | OUTPATIENT
Start: 2025-05-29 | End: 2025-05-29

## 2025-05-29 RX ORDER — PANTOPRAZOLE SODIUM 40 MG/1
40 TABLET, DELAYED RELEASE ORAL DAILY
Status: DISPENSED | OUTPATIENT
Start: 2025-05-29

## 2025-05-29 RX ORDER — CELECOXIB 100 MG/1
100 CAPSULE ORAL ONCE
Status: COMPLETED | OUTPATIENT
Start: 2025-05-29 | End: 2025-05-29

## 2025-05-29 RX ORDER — CELECOXIB 50 MG/1
50 CAPSULE ORAL ONCE
Status: DISCONTINUED | OUTPATIENT
Start: 2025-05-29 | End: 2025-05-29

## 2025-05-29 RX ORDER — PANTOPRAZOLE SODIUM 40 MG/1
40 TABLET, DELAYED RELEASE ORAL DAILY
Status: DISCONTINUED | OUTPATIENT
Start: 2025-05-29 | End: 2025-05-29

## 2025-05-29 RX ORDER — ACETAMINOPHEN 325 MG/1
650 TABLET ORAL EVERY 6 HOURS PRN
Status: DISCONTINUED | OUTPATIENT
Start: 2025-05-29 | End: 2025-05-29

## 2025-05-29 RX ORDER — ALBUTEROL SULFATE 90 UG/1
2 INHALANT RESPIRATORY (INHALATION) EVERY 4 HOURS PRN
Status: ACTIVE | OUTPATIENT
Start: 2025-05-29

## 2025-05-29 RX ORDER — DEXTROSE 50 % IN WATER (D50W) INTRAVENOUS SYRINGE
12.5
Status: ACTIVE | OUTPATIENT
Start: 2025-05-29

## 2025-05-29 RX ORDER — METOPROLOL SUCCINATE 25 MG/1
25 TABLET, EXTENDED RELEASE ORAL NIGHTLY
Status: DISPENSED | OUTPATIENT
Start: 2025-05-29

## 2025-05-29 RX ORDER — CLOPIDOGREL BISULFATE 75 MG/1
75 TABLET ORAL DAILY
Status: DISCONTINUED | OUTPATIENT
Start: 2025-05-29 | End: 2025-05-30

## 2025-05-29 RX ORDER — CETIRIZINE HYDROCHLORIDE 10 MG/1
10 TABLET ORAL EVERY MORNING
Status: DISPENSED | OUTPATIENT
Start: 2025-05-29

## 2025-05-29 RX ORDER — ICOSAPENT ETHYL 1 G/1
2 CAPSULE ORAL
Status: DISPENSED | OUTPATIENT
Start: 2025-05-29

## 2025-05-29 RX ORDER — GADOTERATE MEGLUMINE 376.9 MG/ML
19 INJECTION INTRAVENOUS
Status: COMPLETED | OUTPATIENT
Start: 2025-05-29 | End: 2025-05-29

## 2025-05-29 RX ORDER — INSULIN LISPRO 100 [IU]/ML
0-10 INJECTION, SOLUTION INTRAVENOUS; SUBCUTANEOUS
Status: DISPENSED | OUTPATIENT
Start: 2025-05-29

## 2025-05-29 RX ORDER — ASPIRIN 81 MG/1
81 TABLET ORAL DAILY
Status: DISCONTINUED | OUTPATIENT
Start: 2025-05-29 | End: 2025-05-30

## 2025-05-29 RX ORDER — ACETAMINOPHEN 325 MG/1
650 TABLET ORAL EVERY 6 HOURS PRN
Status: DISPENSED | OUTPATIENT
Start: 2025-05-29

## 2025-05-29 RX ORDER — ALBUTEROL SULFATE 0.83 MG/ML
2.5 SOLUTION RESPIRATORY (INHALATION) ONCE
Status: COMPLETED | OUTPATIENT
Start: 2025-05-29 | End: 2025-05-29

## 2025-05-29 RX ORDER — METOPROLOL SUCCINATE 25 MG/1
25 TABLET, EXTENDED RELEASE ORAL DAILY
Status: DISCONTINUED | OUTPATIENT
Start: 2025-05-29 | End: 2025-05-29

## 2025-05-29 RX ORDER — DEXTROSE 50 % IN WATER (D50W) INTRAVENOUS SYRINGE
25
Status: ACTIVE | OUTPATIENT
Start: 2025-05-29

## 2025-05-29 RX ADMIN — ACETAMINOPHEN 650 MG: 325 TABLET ORAL at 04:26

## 2025-05-29 RX ADMIN — GADOTERATE MEGLUMINE 19 ML: 376.9 INJECTION INTRAVENOUS at 11:53

## 2025-05-29 RX ADMIN — INSULIN LISPRO 2 UNITS: 100 INJECTION, SOLUTION INTRAVENOUS; SUBCUTANEOUS at 17:44

## 2025-05-29 RX ADMIN — DAPAGLIFLOZIN 5 MG: 5 TABLET, FILM COATED ORAL at 08:31

## 2025-05-29 RX ADMIN — SODIUM CHLORIDE, SODIUM LACTATE, POTASSIUM CHLORIDE, AND CALCIUM CHLORIDE 500 ML: .6; .31; .03; .02 INJECTION, SOLUTION INTRAVENOUS at 04:21

## 2025-05-29 RX ADMIN — KETOROLAC TROMETHAMINE 15 MG: 30 INJECTION, SOLUTION INTRAMUSCULAR at 01:20

## 2025-05-29 RX ADMIN — METOPROLOL SUCCINATE 25 MG: 25 TABLET, EXTENDED RELEASE ORAL at 21:00

## 2025-05-29 RX ADMIN — LISINOPRIL 10 MG: 10 TABLET ORAL at 08:29

## 2025-05-29 RX ADMIN — TRAMADOL HYDROCHLORIDE 50 MG: 50 TABLET, COATED ORAL at 13:50

## 2025-05-29 RX ADMIN — INSULIN LISPRO 2 UNITS: 100 INJECTION, SOLUTION INTRAVENOUS; SUBCUTANEOUS at 08:29

## 2025-05-29 RX ADMIN — ICOSAPENT ETHYL 2 G: 1 CAPSULE ORAL at 08:29

## 2025-05-29 RX ADMIN — CETIRIZINE HYDROCHLORIDE 10 MG: 10 TABLET, FILM COATED ORAL at 08:29

## 2025-05-29 RX ADMIN — ICOSAPENT ETHYL 2 G: 1 CAPSULE ORAL at 17:44

## 2025-05-29 RX ADMIN — CELECOXIB 100 MG: 100 CAPSULE ORAL at 21:00

## 2025-05-29 RX ADMIN — ENOXAPARIN SODIUM 40 MG: 40 INJECTION SUBCUTANEOUS at 08:50

## 2025-05-29 RX ADMIN — ALBUTEROL SULFATE 2.5 MG: 2.5 SOLUTION RESPIRATORY (INHALATION) at 01:13

## 2025-05-29 RX ADMIN — PANTOPRAZOLE SODIUM 40 MG: 40 TABLET, DELAYED RELEASE ORAL at 20:59

## 2025-05-29 RX ADMIN — METOPROLOL SUCCINATE 25 MG: 25 TABLET, EXTENDED RELEASE ORAL at 01:25

## 2025-05-29 RX ADMIN — PANTOPRAZOLE SODIUM 40 MG: 40 TABLET, DELAYED RELEASE ORAL at 01:25

## 2025-05-29 SDOH — ECONOMIC STABILITY: HOUSING INSECURITY: IN THE PAST 12 MONTHS, HOW MANY TIMES HAVE YOU MOVED WHERE YOU WERE LIVING?: 0

## 2025-05-29 SDOH — ECONOMIC STABILITY: FOOD INSECURITY: HOW HARD IS IT FOR YOU TO PAY FOR THE VERY BASICS LIKE FOOD, HOUSING, MEDICAL CARE, AND HEATING?: NOT HARD AT ALL

## 2025-05-29 SDOH — ECONOMIC STABILITY: FOOD INSECURITY: WITHIN THE PAST 12 MONTHS, THE FOOD YOU BOUGHT JUST DIDN'T LAST AND YOU DIDN'T HAVE MONEY TO GET MORE.: NEVER TRUE

## 2025-05-29 SDOH — ECONOMIC STABILITY: FOOD INSECURITY: WITHIN THE PAST 12 MONTHS, YOU WORRIED THAT YOUR FOOD WOULD RUN OUT BEFORE YOU GOT THE MONEY TO BUY MORE.: NEVER TRUE

## 2025-05-29 SDOH — SOCIAL STABILITY: SOCIAL INSECURITY: HAS ANYONE EVER THREATENED TO HURT YOUR FAMILY OR YOUR PETS?: NO

## 2025-05-29 SDOH — SOCIAL STABILITY: SOCIAL INSECURITY
WITHIN THE LAST YEAR, HAVE YOU BEEN RAPED OR FORCED TO HAVE ANY KIND OF SEXUAL ACTIVITY BY YOUR PARTNER OR EX-PARTNER?: NO

## 2025-05-29 SDOH — SOCIAL STABILITY: SOCIAL INSECURITY: WITHIN THE LAST YEAR, HAVE YOU BEEN HUMILIATED OR EMOTIONALLY ABUSED IN OTHER WAYS BY YOUR PARTNER OR EX-PARTNER?: NO

## 2025-05-29 SDOH — ECONOMIC STABILITY: HOUSING INSECURITY: IN THE LAST 12 MONTHS, WAS THERE A TIME WHEN YOU WERE NOT ABLE TO PAY THE MORTGAGE OR RENT ON TIME?: NO

## 2025-05-29 SDOH — ECONOMIC STABILITY: INCOME INSECURITY: IN THE PAST 12 MONTHS HAS THE ELECTRIC, GAS, OIL, OR WATER COMPANY THREATENED TO SHUT OFF SERVICES IN YOUR HOME?: NO

## 2025-05-29 SDOH — HEALTH STABILITY: PHYSICAL HEALTH: ON AVERAGE, HOW MANY DAYS PER WEEK DO YOU ENGAGE IN MODERATE TO STRENUOUS EXERCISE (LIKE A BRISK WALK)?: 3 DAYS

## 2025-05-29 SDOH — HEALTH STABILITY: PHYSICAL HEALTH: ON AVERAGE, HOW MANY MINUTES DO YOU ENGAGE IN EXERCISE AT THIS LEVEL?: 20 MIN

## 2025-05-29 SDOH — SOCIAL STABILITY: SOCIAL INSECURITY: HAVE YOU HAD ANY THOUGHTS OF HARMING ANYONE ELSE?: NO

## 2025-05-29 SDOH — SOCIAL STABILITY: SOCIAL INSECURITY: WITHIN THE LAST YEAR, HAVE YOU BEEN AFRAID OF YOUR PARTNER OR EX-PARTNER?: NO

## 2025-05-29 SDOH — SOCIAL STABILITY: SOCIAL INSECURITY: ABUSE: ADULT

## 2025-05-29 SDOH — SOCIAL STABILITY: SOCIAL INSECURITY: ARE YOU OR HAVE YOU BEEN THREATENED OR ABUSED PHYSICALLY, EMOTIONALLY, OR SEXUALLY BY ANYONE?: NO

## 2025-05-29 SDOH — SOCIAL STABILITY: SOCIAL INSECURITY: DO YOU FEEL ANYONE HAS EXPLOITED OR TAKEN ADVANTAGE OF YOU FINANCIALLY OR OF YOUR PERSONAL PROPERTY?: NO

## 2025-05-29 SDOH — SOCIAL STABILITY: SOCIAL INSECURITY
WITHIN THE LAST YEAR, HAVE YOU BEEN KICKED, HIT, SLAPPED, OR OTHERWISE PHYSICALLY HURT BY YOUR PARTNER OR EX-PARTNER?: NO

## 2025-05-29 SDOH — ECONOMIC STABILITY: HOUSING INSECURITY: AT ANY TIME IN THE PAST 12 MONTHS, WERE YOU HOMELESS OR LIVING IN A SHELTER (INCLUDING NOW)?: NO

## 2025-05-29 SDOH — SOCIAL STABILITY: SOCIAL INSECURITY: WERE YOU ABLE TO COMPLETE ALL THE BEHAVIORAL HEALTH SCREENINGS?: YES

## 2025-05-29 SDOH — ECONOMIC STABILITY: TRANSPORTATION INSECURITY: IN THE PAST 12 MONTHS, HAS LACK OF TRANSPORTATION KEPT YOU FROM MEDICAL APPOINTMENTS OR FROM GETTING MEDICATIONS?: NO

## 2025-05-29 SDOH — SOCIAL STABILITY: SOCIAL INSECURITY: DOES ANYONE TRY TO KEEP YOU FROM HAVING/CONTACTING OTHER FRIENDS OR DOING THINGS OUTSIDE YOUR HOME?: NO

## 2025-05-29 SDOH — SOCIAL STABILITY: SOCIAL INSECURITY: ARE THERE ANY APPARENT SIGNS OF INJURIES/BEHAVIORS THAT COULD BE RELATED TO ABUSE/NEGLECT?: NO

## 2025-05-29 SDOH — SOCIAL STABILITY: SOCIAL INSECURITY: DO YOU FEEL UNSAFE GOING BACK TO THE PLACE WHERE YOU ARE LIVING?: NO

## 2025-05-29 SDOH — SOCIAL STABILITY: SOCIAL INSECURITY: HAVE YOU HAD THOUGHTS OF HARMING ANYONE ELSE?: NO

## 2025-05-29 ASSESSMENT — LIFESTYLE VARIABLES
HOW OFTEN DO YOU HAVE A DRINK CONTAINING ALCOHOL: NEVER
PRESCIPTION_ABUSE_PAST_12_MONTHS: NO
SKIP TO QUESTIONS 9-10: 1
SUBSTANCE_ABUSE_PAST_12_MONTHS: NO
HOW MANY STANDARD DRINKS CONTAINING ALCOHOL DO YOU HAVE ON A TYPICAL DAY: PATIENT DOES NOT DRINK
AUDIT-C TOTAL SCORE: 0
HOW OFTEN DO YOU HAVE 6 OR MORE DRINKS ON ONE OCCASION: NEVER
AUDIT-C TOTAL SCORE: 0

## 2025-05-29 ASSESSMENT — COGNITIVE AND FUNCTIONAL STATUS - GENERAL
WALKING IN HOSPITAL ROOM: A LITTLE
MOBILITY SCORE: 22
MOBILITY SCORE: 24
TOILETING: A LITTLE
DAILY ACTIVITIY SCORE: 23
DAILY ACTIVITIY SCORE: 24
PATIENT BASELINE BEDBOUND: NO
CLIMB 3 TO 5 STEPS WITH RAILING: A LITTLE

## 2025-05-29 ASSESSMENT — ACTIVITIES OF DAILY LIVING (ADL)
TOILETING: INDEPENDENT
ADEQUATE_TO_COMPLETE_ADL: YES
GROOMING: INDEPENDENT
JUDGMENT_ADEQUATE_SAFELY_COMPLETE_DAILY_ACTIVITIES: YES
HEARING - RIGHT EAR: FUNCTIONAL
PATIENT'S MEMORY ADEQUATE TO SAFELY COMPLETE DAILY ACTIVITIES?: YES
HEARING - LEFT EAR: FUNCTIONAL
WALKS IN HOME: INDEPENDENT
FEEDING YOURSELF: INDEPENDENT
BATHING: INDEPENDENT
LACK_OF_TRANSPORTATION: NO
DRESSING YOURSELF: INDEPENDENT

## 2025-05-29 ASSESSMENT — PAIN - FUNCTIONAL ASSESSMENT: PAIN_FUNCTIONAL_ASSESSMENT: 0-10

## 2025-05-29 ASSESSMENT — PATIENT HEALTH QUESTIONNAIRE - PHQ9
2. FEELING DOWN, DEPRESSED OR HOPELESS: NOT AT ALL
SUM OF ALL RESPONSES TO PHQ9 QUESTIONS 1 & 2: 0
1. LITTLE INTEREST OR PLEASURE IN DOING THINGS: NOT AT ALL

## 2025-05-29 ASSESSMENT — PAIN SCALES - GENERAL
PAINLEVEL_OUTOF10: 3
PAINLEVEL_OUTOF10: 0 - NO PAIN
PAINLEVEL_OUTOF10: 0 - NO PAIN

## 2025-05-29 NOTE — H&P
History Of Present Illness  Patricia Gibbs is a 49 y.o. female with a past medical history of DM2, CAD/STEMI s/p stent, HTN, HLD, lung nodule, obesity, who presented to [facility] ED today with right foot wound. Patient was sent in by Dr. Eden's office for MRI and IV antibiotics with concern for osteomyelitis. Wound culture obtained in office and wound was mechanically debrided with saline and gauze in office. Patient states the wound started off as a blister about 3 weeks ago from wearing poorly fitted shoes that irritated her bunion. States the blister opened and then continued to get worse. She saw Dr. Romo in office yesterday and showed her the wound, and she sent her to the wound care clinic for management. She was also treated in the office for an URI/bronchitis and started on Amoxicillin. She states she woke up today and the foot wound was worse with swelling and redness and tender. States she has a headache and has had some diarrhea, mainly during coughing fits. Denies fever, chills, chest pain, shortness of breath, abdominal pain, nausea, vomiting, urinary symptoms, or constipation. PCP: Dr. Romo. Podiatrist: Dr. Eden.    ED course: Hemodynamically stable. Afebrile. /79 with , RR 20, 99% RA. EKG unavailable for my review. Labs: glucose 187. Creatinine 1.45, GFR 44. WBC 13.8, neutrophils 11.36. ESR 70, CRP 29.35. Blood cultures ordered. See imaging results. Zosyn, vancomycin given in ED. Pt is being admitted to Dr. Cecil Ibanez who will continue to follow pt. I was asked to do the H&P and initial assessment.     Past Medical History  Medical History[1]    Surgical History  Surgical History[2]     Social History  She reports that she quit smoking about 21 years ago. Her smoking use included cigarettes. She started smoking about 31 years ago. She has never used smokeless tobacco. She reports current alcohol use. She reports current drug use. Drug:  "Marijuana.    Family History  Family History[3]     Allergies  Bee venom protein (honey bee), Codeine, and Montelukast    Review of Systems  10 Point review of systems was performed and is negative except for what is stated in the HPI above.   Physical Exam  Constitutional:       Appearance: Normal appearance. She is obese.   HENT:      Head: Normocephalic and atraumatic.      Nose: Nose normal.      Mouth/Throat:      Mouth: Mucous membranes are moist.      Pharynx: Oropharynx is clear.   Eyes:      Extraocular Movements: Extraocular movements intact.      Conjunctiva/sclera: Conjunctivae normal.      Pupils: Pupils are equal, round, and reactive to light.   Cardiovascular:      Rate and Rhythm: Normal rate and regular rhythm.      Pulses: Normal pulses.      Heart sounds: Normal heart sounds.   Pulmonary:      Effort: Pulmonary effort is normal.      Breath sounds: Normal breath sounds.   Abdominal:      General: Bowel sounds are normal.      Palpations: Abdomen is soft.   Musculoskeletal:         General: Normal range of motion.      Cervical back: Normal range of motion and neck supple.   Skin:     General: Skin is warm and dry.      Capillary Refill: Capillary refill takes 2 to 3 seconds.      Comments: Right 1st digit with significant erythema and edema with serosanguineoious drainage noted.  Fat layer exposed, no tunneling. Erythema extends to 4th digit and extends up partial forefoot. MSP's intact.   Neurological:      General: No focal deficit present.      Mental Status: She is alert and oriented to person, place, and time.   Psychiatric:         Mood and Affect: Mood normal.         Behavior: Behavior normal.         Thought Content: Thought content normal.         Judgment: Judgment normal.          Last Recorded Vitals  Blood pressure 129/56, pulse 105, temperature 36.7 °C (98.1 °F), resp. rate 16, height 1.626 m (5' 4\"), weight 125 kg (275 lb), SpO2 96%.    Relevant Results  Results for orders placed " or performed during the hospital encounter of 05/28/25 (from the past 24 hours)   Sedimentation Rate   Result Value Ref Range    Sedimentation Rate 70 (H) 0 - 20 mm/h   C-Reactive Protein   Result Value Ref Range    C-Reactive Protein 29.35 (H) <1.00 mg/dL   Comprehensive Metabolic Panel   Result Value Ref Range    Glucose 187 (H) 74 - 99 mg/dL    Sodium 136 136 - 145 mmol/L    Potassium 4.0 3.5 - 5.3 mmol/L    Chloride 100 98 - 107 mmol/L    Bicarbonate 23 21 - 32 mmol/L    Anion Gap 17 10 - 20 mmol/L    Urea Nitrogen 20 6 - 23 mg/dL    Creatinine 1.45 (H) 0.50 - 1.05 mg/dL    eGFR 44 (L) >60 mL/min/1.73m*2    Calcium 9.5 8.6 - 10.3 mg/dL    Albumin 4.1 3.4 - 5.0 g/dL    Alkaline Phosphatase 77 33 - 110 U/L    Total Protein 8.1 6.4 - 8.2 g/dL    AST 11 9 - 39 U/L    Bilirubin, Total 0.5 0.0 - 1.2 mg/dL    ALT 12 7 - 45 U/L   CBC and Auto Differential   Result Value Ref Range    WBC 13.8 (H) 4.4 - 11.3 x10*3/uL    nRBC 0.0 0.0 - 0.0 /100 WBCs    RBC 4.80 4.00 - 5.20 x10*6/uL    Hemoglobin 12.9 12.0 - 16.0 g/dL    Hematocrit 41.7 36.0 - 46.0 %    MCV 87 80 - 100 fL    MCH 26.9 26.0 - 34.0 pg    MCHC 30.9 (L) 32.0 - 36.0 g/dL    RDW 14.2 11.5 - 14.5 %    Platelets 228 150 - 450 x10*3/uL    Neutrophils % 82.2 40.0 - 80.0 %    Immature Granulocytes %, Automated 0.4 0.0 - 0.9 %    Lymphocytes % 10.3 13.0 - 44.0 %    Monocytes % 6.4 2.0 - 10.0 %    Eosinophils % 0.5 0.0 - 6.0 %    Basophils % 0.2 0.0 - 2.0 %    Neutrophils Absolute 11.36 (H) 1.20 - 7.70 x10*3/uL    Immature Granulocytes Absolute, Automated 0.06 0.00 - 0.70 x10*3/uL    Lymphocytes Absolute 1.43 1.20 - 4.80 x10*3/uL    Monocytes Absolute 0.88 0.10 - 1.00 x10*3/uL    Eosinophils Absolute 0.07 0.00 - 0.70 x10*3/uL    Basophils Absolute 0.03 0.00 - 0.10 x10*3/uL   Lactate   Result Value Ref Range    Lactate 0.9 0.4 - 2.0 mmol/L   POCT GLUCOSE   Result Value Ref Range    POCT Glucose 253 (H) 74 - 99 mg/dL     XR foot right 3+ views  Result Date:  5/28/2025  Interpreted By:  Alexa Cline, STUDY: XR FOOT RIGHT 3+ VIEWS; ;  5/28/2025 10:44 pm   INDICATION: Signs/Symptoms:diabetic foot ulcer, medial aspect of the right first toe.   COMPARISON: Right foot radiographs 02/19/2025, right foot CT 03/06/2025   ACCESSION NUMBER(S): [id number]   ORDERING CLINICIAN: KAZ KELLY   FINDINGS: Three views right foot and two views right great toe: The hallux valgus with lateral subluxation of the 1st MTP joint again noted. There are chronic fracture deformities of the 4th and 5th metatarsals. Flattening and irregularity of the 2nd metatarsal head consistent with chronic Freiberg infraction. There is a periarticular erosion of the great toe, seen on prior studies. Severe midfoot osteoarthrosis noted. No osseous destruction. There is a plantar calcaneal spur.   Nonspecific soft tissue swelling of the dorsum of the foot and great toe. There appears to be an ulceration at the base of the great toe. No soft tissue gas or radiopaque foreign body.       Soft tissue swelling of the dorsum of the foot and great toe with ulceration at the level of the 1st MTP joint.   No radiographic evidence of osteomyelitis.  MRI is more sensitive for early changes of osteomyelitis and may be considered if clinically indicated.   Degenerative and posttraumatic changes as above.     MACRO: None   Signed by: Alexa Cline 5/28/2025 11:19 PM Dictation workstation:   [id number]    XR toe right 2+ views  Result Date: 5/28/2025  Interpreted By:  Alexa Cline, STUDY: XR FOOT RIGHT 3+ VIEWS; ;  5/28/2025 10:44 pm   INDICATION: Signs/Symptoms:diabetic foot ulcer, medial aspect of the right first toe.   COMPARISON: Right foot radiographs 02/19/2025, right foot CT 03/06/2025   ACCESSION NUMBER(S): [id number]   ORDERING CLINICIAN: KAZ KELLY   FINDINGS: Three views right foot and two views right great toe: The hallux valgus with lateral subluxation of the 1st MTP joint again noted. There are  chronic fracture deformities of the 4th and 5th metatarsals. Flattening and irregularity of the 2nd metatarsal head consistent with chronic Freiberg infraction. There is a periarticular erosion of the great toe, seen on prior studies. Severe midfoot osteoarthrosis noted. No osseous destruction. There is a plantar calcaneal spur.   Nonspecific soft tissue swelling of the dorsum of the foot and great toe. There appears to be an ulceration at the base of the great toe. No soft tissue gas or radiopaque foreign body.       Soft tissue swelling of the dorsum of the foot and great toe with ulceration at the level of the 1st MTP joint.   No radiographic evidence of osteomyelitis.  MRI is more sensitive for early changes of osteomyelitis and may be considered if clinically indicated.   Degenerative and posttraumatic changes as above.     MACRO: None   Signed by: Alexa Cline 5/28/2025 11:19 PM Dictation workstation:   [id number]    XR chest 2 views  Result Date: 5/28/2025  Interpreted By:  Quinton Abdalla, STUDY: XR CHEST 2 VIEWS;  5/28/2025 7:48 pm   INDICATION: Signs/Symptoms:Cough, SOB.     COMPARISON: 10/03/2024   ACCESSION NUMBER(S): [id number]   ORDERING CLINICIAN: JAVIER BAUMANN   FINDINGS:     The cardiomediastinal silhouette and pulmonary vasculature are within normal limits. Right coronary stent noted.   No consolidation, pleural effusion or pneumothorax.         No acute cardiopulmonary process.     MACRO: None.   Signed by: Quinton Abdalla 5/28/2025 7:51 PM Dictation workstation:   [id number]     Assessment & Plan  Cellulitis    Diabetes (Multi)    SAMANTHA (acute kidney injury)    Elevated C-reactive protein (CRP)    Leukocytosis    49 year old female who presented to ED today with infected right foot. Podiatry consulted. Concern for osteomyelitis. Continue antibiotics. Wound culture ordered. Patient to be admitted to hospital for further medical management.    Cellulitis of right great toe  Diabetic  ulcer  SAMANTHA  Elevated inflammatory markers  Leukocytosis  Severe Charcot deformity of right foot  Admit to inpatient/telemetry to Dr. Cecil Ibanez  Podiatry consult and appreciate recs  See imaging results  Wound culture obtained in podiatry office  Follow blood cultures  Continue Zosyn and vancomycin  SSI with hypoglycemic protocol  Repeat labs in AM    Chronic conditions  #CAD, HTN, HLD, lung nodule  Continue home meds as appropriate when nursing completes home med rec.   Full code    DVT prophylaxis  Lovenox SQ  SCD's, as tolerated    I spent 40 minutes in the professional and overall care of this patient.    Kayley Flores, APRN-CNP         [1]   Past Medical History:  Diagnosis Date    Bunion     Coronary artery disease     Diabetes mellitus type 2 in obese     Essential hypertension, benign     High arches     HLD (hyperlipidemia)     Obesity     STEMI (ST elevation myocardial infarction) (Multi)    [2]   Past Surgical History:  Procedure Laterality Date     SECTION, LOW TRANSVERSE  [id number]    CORONARY STENT PLACEMENT  2019    on plavix    OTHER SURGICAL HISTORY  2022    Tonsillectomy    OTHER SURGICAL HISTORY  2022     section    TOENAIL EXCISION     [3]   Family History  Problem Relation Name Age of Onset    Other (cva [Other] Depression, [place]) Mother Bushra Ibanez     Coronary artery disease Mother Bushra Ibanez     Arthritis Mother Bushra Ibanez     COPD Mother Bushra Ibanez     Depression Mother Bushra Ibanez     Mental illness Mother Bushra Ibanez     Miscarriages / Stillbirths Mother Bushra Ibanez     Stroke Mother Bushra Ibanez     Hypertension Mother Bushra Ibanez     Blood clot Mother Bushra Ibanez     Heart disease Mother Bushra Ibanez     Hernia Mother Bushra Ibanez     Osteoporosis Mother Bushra Ibanez     No Known Problems Father      No Known Problems Sister      Other (Diabetes,HTN) Brother      No Known Problems Son      No Known Problems Maternal Grandmother       Other (Cardiac) Maternal Grandfather      No Known Problems Paternal Grandmother      No Known Problems Paternal Grandfather      Diabetes Brother Laci Ibanez     Hearing loss Brother Laci Ibanez     Diabetes Brother Laci Ibanez     Hearing loss Brother Laci Ibanez

## 2025-05-29 NOTE — CONSULTS
"Nutrition Initial Assessment:   Nutrition Assessment    Reason for Assessment: Admission nursing screening (wound; MST=0 (however unsure was marked on screen))    Patient is a 49 y.o. female presenting with cellulitis R foot     Pmhx: T2DM, CAD, STEMI, HTN, HLD, lung nodule, obesity     Nutrition History:  Energy Intake:  (not established yet)  Pain affecting nutrition status: N/A  Food and Nutrient History: Pt was unavailable x2 attempts today (pt care x1, on phone x 2nd attempt). Per review of chart, Pt was being followed by bariatric RD for potential bariatric surgery, however last appointment in hx was in January.  Pt will be NPO after midnight for R foot I&D and bone resection.   this RD ordered Edward BID to support wound healing  Vitamin/Herbal Supplement Use: none noted       Anthropometrics:  Height: 162.6 cm (5' 4.02\")   Weight: 125 kg (275 lb 9.2 oz) (stated)   BMI (Calculated): 47.28  IBW/kg (Dietitian Calculated): 54.5 kg  Percent of IBW: 229 %      Weight History:     Weight Change %:  Weight History / % Weight Change: 2/19/25 121kg, 1/27/25 120kg, 11/23/24 120kg, 10/16/24 122kg, 9/18/24 122kg, 8/18/24 122kg, 7/24/24 125kg, 6/27/24 117kg  Significant Weight Loss: No    Nutrition Focused Physical Exam Findings:    Subcutaneous Fat Loss:   Defer Subcutaneous Fat Loss Assessment: Defer all  Defer All Reason: unavailable  Muscle Wasting:  Defer Muscle Wasting Assessment: Defer all  Defer All Reason: unavailable  Edema:  Edema: none  Physical Findings:  Skin: Positive (R great toe diabetic ulcer)  Positive Skin Findings: Impaired wound healing    Nutrition Significant Labs:  CBC Trend:   Results from last 7 days   Lab Units 05/29/25  0911 05/28/25 1943   WBC AUTO x10*3/uL 8.4 13.8*   RBC AUTO x10*6/uL 4.51 4.80   HEMOGLOBIN g/dL 12.4 12.9   HEMATOCRIT % 39.0 41.7   MCV fL 87 87   PLATELETS AUTO x10*3/uL 218 228    , BMP Trend:   Results from last 7 days   Lab Units 05/29/25  0911 05/28/25 1943   GLUCOSE " mg/dL 240* 187*   CALCIUM mg/dL 9.0 9.5   SODIUM mmol/L 137 136   POTASSIUM mmol/L 4.2 4.0   CO2 mmol/L 24 23   CHLORIDE mmol/L 103 100   BUN mg/dL 23 20   CREATININE mg/dL 1.37* 1.45*    , A1C:  Lab Results   Component Value Date    HGBA1C 8.1 (H) 02/19/2025   , BG POCT trend:   Results from last 7 days   Lab Units 05/29/25  0633 05/29/25  0114   POCT GLUCOSE mg/dL 177* 253*        Nutrition Specific Medications:  Reviewed     I/O:    ;      Dietary Orders (From admission, onward)       Start     Ordered    05/30/25 0001  NPO Diet Except: Sips with meds; Effective midnight  Diet effective midnight        Question:  Except:  Answer:  Sips with meds    05/29/25 0910    05/29/25 1114  Oral nutritional supplements  Until discontinued        Question Answer Comment   Deliver with Dinner    Deliver with Lunch    Select supplement: Edward        05/29/25 1113    05/29/25 0911  Adult diet Consistent Carb; CCD 60 gm/meal  Diet effective now        Question Answer Comment   Diet type Consistent Carb    Carb diet selection: CCD 60 gm/meal        05/29/25 0910    05/29/25 0109  May Participate in Room Service  ( ROOM SERVICE MAY PARTICIPATE)  Once        Question:  .  Answer:  Yes    05/29/25 0108                     Estimated Needs:      Method for Estimating Needs: 1470-1635kcals (27-30kcals/kg IBW)     Method for Estimating 24 Hour Protein Needs: 65-82g (1.2-1.5g/kg IBW)     Method for Estimating 24 Hour Fluid Needs: 1 mL/kcal or as per MD  Patient on Order Fluid Restriction: No        Nutrition Diagnosis   Malnutrition Diagnosis  Patient has Malnutrition Diagnosis:  (unable to determine at this emeterio e)    Nutrition Diagnosis  Patient has Nutrition Diagnosis: Yes  Diagnosis Status (1): New  Nutrition Diagnosis 1: Increased nutrient needs  Related to (1): physiological causes increasing nutrient needs  As Evidenced by (1): wound healing needs       Nutrition Interventions/Recommendations   Nutrition prescription for oral  nutrition    Nutrition Recommendations:  Individualized Nutrition Prescription Provided for : 60gm carb controlled diet with Edward BID  (will order ONS if meal intakes avg <75% )    Nutrition Interventions/Goals:   Meals and Snacks: Carbohydrate-modified diet  Goal: consume 3 meals per day  Medical Food Supplement: Commercial beverage medical food supplement therapy  Goal: consume Edward BID (for an additional 90 kcals, 2.5 gm protein, supplement glutamine and arginine)    Education Documentation   Will reassess education needs at follow up      Nutrition Monitoring and Evaluation   Food/Nutrient Related History Monitoring  Monitoring and Evaluation Plan: Intake / amount of food  Intake / Amount of food: Consumes at least 75% or more of meals/snacks/supplements, Meets > 75% estimated energy needs    Anthropometric Measurements  Monitoring and Evaluation Plan: Body weight  Body Weight: Body weight - Maintain stable weight (therapeutic weight loss)    Biochemical Data, Medical Tests and Procedures  Monitoring and Evaluation Plan: Electrolyte/renal panel, Glucose/endocrine profile  Electrolyte and Renal Panel: Electrolytes within normal limits  Glucose/Endocrine Profile: Glucose within normal limits ( mg/dL)    Physical Exam Findings  Monitoring and Evaluation Plan: Skin  Skin Finding: Impaired wound healing - Improved wound healing, Impaired wound healing - Skin to heal    Goal Status: New goal(s) identified    Time Spent (min): 45 minutes

## 2025-05-29 NOTE — CONSULTS
PODIATRY CONSULT NOTE    SERVICE DATE: 5/29/2025   SERVICE TIME:  0841    REASON FOR CONSULT: Right foot wound, cellulitis  REQUESTING PHYSICIAN: SEBASTIEN Azevedo  PRIMARY CARE PHYSICIAN: SEBASTIEN Olivas    Subjective   HPI:  Ms. Gibbs is a 49 y.o. female with PMHx of T2DM c/b peripheral polyneuropathy, Charcot neuroarthropathy of the right foot, STEMI s/p PCI, CAD, HTN, HLD, lung nodule, obesity, who presented to [facility] ED today with right foot wound. Patient was sent in from the wound center by Dr. Eden for MRI and IV antibiotics with concern for osteomyelitis. Wound culture obtained in office and wound was mechanically debrided with saline and gauze in office. Patient states the wound started off as a blister about 3 weeks ago from wearing poorly fitted shoes that irritated her bunion. States the blister opened and then continued to get worse. She saw Dr. Romo in office yesterday and showed her the wound, and she sent her to the wound care clinic for management. She was also treated in the office for an URI/bronchitis and started on Amoxicillin. She states she woke up today and the foot wound was worse with swelling and redness and tender. States she has a headache and has had some diarrhea, mainly during coughing fits. There has been intermittent dyspnea during coughing fits, which is quickly relieved. Patient denies any fever, chills, lightheadedness, chest pain, abdominal pain, nausea, vomiting, or dysuria at this time.     ED course: Hemodynamically stable. Afebrile. /79 with , RR 20, 99% RA. EKG unavailable for my review. Labs: glucose 187. Creatinine 1.45, GFR 44. WBC 13.8, neutrophils 11.36. ESR 70, CRP 29.35. Blood cultures ordered. See imaging results. Zosyn, vancomycin given in ED. Pt is being admitted to Dr. Cecil Ibanez who will continue to follow pt.     Podiatry was consulted for management of the right foot infection. Dr. Eden had  hoped to get STAT MRI and take to OR today, but patient ate breakfast this morning. Will continue with MRI and plan for OR tomorrow. Patient reports being told she has foot deformity and charcot. Despite this, she has been ambulating in normal shoe gear. She reports being on her feet a lot outside over Memorial day, but denies walking around barefoot or having her foot submerged in water. She reports the foot is already looking and feeling a bit better, and the erythema has receded since starting IV abx. Agreeable to OR tomorrow.    ROS: 10-point review of systems was performed and is otherwise negative except as noted in HPI.  PMH: Reviewed/documented below.  PSH:  Noncontributory except per HPI   FH: Reviewed and noncontributory   SOCIAL:  Reviewed/documented below.  ALLERGIES: Reviewed/documented below.  MEDS: Reviewed/documented below.  VS: Reviewed/documented below.    Medical History[1]  Surgical History[2]  Family History[3]  Social History[4]   Prescriptions Prior to Admission[5]     Medications:  Scheduled Meds: Scheduled Medications[6]  Continuous Infusions: Continuous Medications[7]  PRN Meds: PRN Medications[8]    Allergies as of 05/28/2025 - Reviewed 05/28/2025   Allergen Reaction Noted    Bee venom protein (honey bee) Anaphylaxis 04/17/2023    Codeine Hives 04/17/2023    Montelukast Unknown 08/09/2023            Objective   PHYSICAL EXAM:  Physical Exam Performed:  Vitals:    05/29/25 0749   BP: 141/82   Pulse: 87   Resp:    Temp: 36.1 °C (97 °F)   SpO2: 95%     Body mass index is 47.2 kg/m².    Patient is AOx3 and in no acute distress. Patient is alert and cooperative. Sitting comfortably in bed with dressing clean, dry and intact. Unaccompanied.     Right Lower Extremity Focused Examination:  Vascular: Palpable DP/PT pulses. Moderate edema noted. Hair growth absent. CFT<5 to hallux. Temperature is warm to warm from tibial tuberosity to distal digits with focal callor over 1st MTPJ and great  toe.    Musculoskeletal: There is mild tenderness to the wound and periwound tissues, extending 4cm proximal from wound dorsal and medially, but no plantar tenderness. There is pain with passive or active ROM of the 1st MTPJ. EHL and FHL grossly intact. Gross active and passive ROM intact to age and activity level. Moves all extremities spontaneously. No pain with calf compression. Palpable nodule noted lateral to 5th metatarsal, corresponds with malunited shaft fracture.    Neurological: Intact light touch sensation. Pain stimuli diminished.     Dermatologic: Wound as noted below. There is erythema and callor extending distally from the wound into the great toe, and extending proximally to the dorsal midfoot. Nails 1-5 are within normal limits for thickness and length. Skin appears diffusely xerotic. Web spaces 1-4 are clean, dry and intact.         Wound: Right medial forefoot  Measurements: 2.0 x 1.4 x 0.3 cm  Mixed wound base of fibrotic and granular tissue.   Hyperkeratotic and macerated borders.  Mild serosanguinous drainage with significant fibrotic slough.   Moderate lesa-wound maceration.   Significant lesa-wound erythema.   Mild evidence of ascending cellulitis or lymphangitis.  No palpable fluctuance. Minimal malodor. Significant increased warmth.   Positive probe to tendon/capsule within the wound base, at dorsal margin.   There is undermining 1cm distal and 0.5cm at dorsal and proximal borders.    LABS:      Lab Results   Component Value Date    HGBA1C 8.1 (H) 02/19/2025      Lab Results   Component Value Date    CRP 29.35 (H) 05/28/2025      Lab Results   Component Value Date    SEDRATE 70 (H) 05/28/2025        Results from last 7 days   Lab Units 05/29/25  0911   WBC AUTO x10*3/uL 8.4   RBC AUTO x10*6/uL 4.51   HEMOGLOBIN g/dL 12.4   HEMATOCRIT % 39.0     Results from last 7 days   Lab Units 05/29/25  0911 05/28/25  1943   SODIUM mmol/L 137 136   POTASSIUM mmol/L 4.2 4.0   CHLORIDE mmol/L 103 100    CO2 mmol/L 24 23   BUN mg/dL 23 20   CREATININE mg/dL 1.37* 1.45*   CALCIUM mg/dL 9.0 9.5   BILIRUBIN TOTAL mg/dL  --  0.5   ALT U/L  --  12   AST U/L  --  11           IMAGING REVIEW:  Imaging  XR foot right 3+ views  Result Date: 5/28/2025  Soft tissue swelling of the dorsum of the foot and great toe with ulceration at the level of the 1st MTP joint.   No radiographic evidence of osteomyelitis.  MRI is more sensitive for early changes of osteomyelitis and may be considered if clinically indicated.   Degenerative and posttraumatic changes as above.     MACRO: None   Signed by: Alexa Cline 5/28/2025 11:19 PM Dictation workstation:   [id number]    XR toe right 2+ views  Result Date: 5/28/2025  Soft tissue swelling of the dorsum of the foot and great toe with ulceration at the level of the 1st MTP joint.   No radiographic evidence of osteomyelitis.  MRI is more sensitive for early changes of osteomyelitis and may be considered if clinically indicated.   Degenerative and posttraumatic changes as above.     MACRO: None   Signed by: Alexa Cline 5/28/2025 11:19 PM Dictation workstation:   [id number]    XR chest 2 views  Result Date: 5/28/2025  No acute cardiopulmonary process.     MACRO: None.   Signed by: Quinton Abdalla 5/28/2025 7:51 PM Dictation workstation:   [id number]      Cardiology, Vascular, and Other Imaging  No other imaging results found for the past 7 days            Assessment/Plan   ASSESSMENT & PLAN:    # Non-pressure ulcer of right medial forefoot with necrosis of muscle  # Cellulitis of right foot  # Likely osteomyelitis of right foot 1st metatarsal head  # Possible septic arthritis of right 1st MTPJ  # Charcot neuroarthropathy of right foot  # Type 2 DM c/b peripheral polyneuropathy    - Patient was seen and evaluated; all findings were discussed and all questions were answered to patient's satisfaction.  - Charts, labs, vitals and imaging all reviewed.   - Labs: WBC 8.4 (down from 13.8),  ESR 70, CRP 29.35,   - Imaging: Right foot MRI ordered and pending  Right foot XR with no acute findings. No osseous erosion. No soft tissue gas. Chronic findings: Non-acute appearance of tarsometatarsal joint destruction and collapse c/w charcot neuroarthropathy, healed malunions of 4th and 5th metatarsal diaphyses. Severe metatarsus adductus and hallux valgus deformities. 2nd metatarsal head collapse c/w [place] infraction  - Wound culture: collected and pending  - Blood cultures: collected and pending    Plan:  - Evaluated bedside on nursing floor. Discussed prognosis and plan for MRI and OR. Dressing changed as noted below.   - STAT MRI right foot ordered and pending.  - Will require podiatric surgical intervention this hospitalization, consisting of Right foot I&D with bone resection. Discussed this in detail with patient as well as risks and benefits   - Surgery scheduled for tomorrow Friday 5/30 at 1200. NPO after midnight.  - Aspirin, Plavix, and Lovenox held pending OR.  - Dressings: Betadine wet to dry with 4x4s, ABD, kerlix, Ace. To be changed daily.   - Nursing staff is able to change/reinforce dressing if & as necessary until next dressing change. Thank you.  - Abx: vanco + Zosyn, pending cultures per primary  - Pain regimen per primary  - Bowel regimen per primary  - Podiatry will continue to follow while in house. Thank you for the consult.  - Discussed with attending Dr. Eden    DVT ppx: held pending OR  Weightbearing: WBAT BLE  Discharge: Pt to follow up 1 week after discharge with Dr. Eden.    Case to be discussed with attending, A&P above reflects a tentative plan. Please await for the final signature from the attending physician on service.    This patient will be followed by the Podiatry service. Please Epic Chat the corresponding residents below with questions or concerns.     Khai Loja DPM PGY-3  Podiatric Medicine and Surgery   Epic Secure Chat Preferred       SIGNATURE:  Khai Loja DPM PATIENT NAME: Patricia Gibbs   DATE: May 29, 2025 MRN: [id number]   TIME: 9:36 AM CONTACT: Epic Chat            [1]   Past Medical History:  Diagnosis Date    Bunion     Coronary artery disease     Diabetes mellitus type 2 in obese     Essential hypertension, benign     High arches     HLD (hyperlipidemia)     Obesity     STEMI (ST elevation myocardial infarction) (Multi)    [2]   Past Surgical History:  Procedure Laterality Date     SECTION, LOW TRANSVERSE  [id number]    CORONARY STENT PLACEMENT  2019    on plavix    OTHER SURGICAL HISTORY  2022    Tonsillectomy    OTHER SURGICAL HISTORY  2022     section    TOENAIL EXCISION     [3]   Family History  Problem Relation Name Age of Onset    Other (cva [Other] Depression, Bilpoar) Mother Bushra Ibanez     Coronary artery disease Mother Bushra Ibanez     Arthritis Mother Bushra Ibanez     COPD Mother Bushra Ibanez     Depression Mother Bushra Ibanez     Mental illness Mother Bushra Shamar     Miscarriages / Stillbirths Mother Bushra Iabnez     Stroke Mother Bushra Shamar     Hypertension Mother Bushra Shamar     Blood clot Mother Bushra Ibanez     Heart disease Mother Bushra Ibanez     Hernia Mother Bushra Ibanez     Osteoporosis Mother Jasonne Shamar     No Known Problems Father      No Known Problems Sister      Other (Diabetes,HTN) Brother      No Known Problems Son      No Known Problems Maternal Grandmother      Other (Cardiac) Maternal Grandfather      No Known Problems Paternal Grandmother      No Known Problems Paternal Grandfather      Diabetes Brother Laci Ibanez     Hearing loss Brother Laci Ibanez     Diabetes Brother Laci Ibanez     Hearing loss Brother Laci Ibanez    [4]   Social History  Tobacco Use    Smoking status: Former     Current packs/day: 0.00     Types: Cigarettes     Start date:      Quit date: 2004     Years since quittin.4    Smokeless tobacco: Never   Substance Use Topics    Alcohol  use: Yes     Comment: Socially- 2 x month    Drug use: Yes     Types: Marijuana     Comment: Ocassinal   [5]   Facility-Administered Medications Prior to Admission   Medication Dose Route Frequency Provider Last Rate Last Admin    albuterol 2.5 mg /3 mL (0.083 %) nebulizer solution 2.5 mg  2.5 mg nebulization Once Lashay Romo, APRN-CNP         Medications Prior to Admission   Medication Sig Dispense Refill Last Dose/Taking    albuterol (ProAir HFA) 90 mcg/actuation inhaler Inhale 2 puffs every 6 hours if needed for wheezing or shortness of breath. 6.7 g 0 5/28/2025 Morning    albuterol (Ventolin HFA) 90 mcg/actuation inhaler Inhale 2 puffs every 4 hours if needed for wheezing or shortness of breath. 8 g 5 5/28/2025 Morning    amoxicillin-clavulanate (Augmentin) 875-125 mg tablet Take 1 tablet (875 mg) by mouth 2 times a day for 10 days. 20 tablet 0 5/28/2025 Morning    aspirin 81 mg EC tablet Take 1 tablet (81 mg) by mouth once daily.   5/28/2025 Morning    cetirizine (ZyrTEC) 10 mg tablet Take 1 tablet (10 mg) by mouth once daily in the morning.   5/28/2025 Morning    clopidogrel (Plavix) 75 mg tablet Take 1 tablet (75 mg) by mouth once daily.   5/28/2025 Morning    dapagliflozin propanediol (Farxiga) 5 mg Take 1 tablet (5 mg) by mouth once daily. 90 tablet 1 5/28/2025 Morning    icosapent ethyL (Vascepa) 1 gram capsule Take 2 capsules (2 g) by mouth 2 times daily (morning and late afternoon). 480 capsule 2 5/28/2025 Morning    lisinopril 10 mg tablet Take 1 tablet (10 mg) by mouth once daily.   5/28/2025 Morning    metFORMIN  mg 24 hr tablet Take 2 tablets (1,000 mg) by mouth 2 times a day. 360 tablet 11 5/28/2025 Morning    metoprolol succinate XL (Toprol-XL) 25 mg 24 hr tablet Take 1 tablet (25 mg) by mouth once daily. Do not crush or chew.   5/27/2025 Evening    multivitamin tablet Take 2 tablets by mouth 2 times a day.   5/27/2025 Evening    pantoprazole (ProtoNix) 40 mg EC tablet TAKE 1 TABLET  (40 MG) BY MOUTH DAILY DO NOT CRUSH, CHEW, ORSPLIT 90 tablet 1 5/27/2025 Evening    brompheniramine-pseudoeph-DM 2-30-10 mg/5 mL syrup Take 5 mL by mouth 4 times a day as needed for congestion or cough for up to 10 days. 120 mL 0     carbamide peroxide (Debrox) 6.5 % otic solution Administer 5 drops into each ear 2 times a day for 4 days. 15 mL 0     levonorgestrel (Mirena) 21 mcg/24 hours (8 yrs) 52 mg IUD by intrauterine route.       predniSONE (Deltasone) 20 mg tablet 3 tabs p.o. daily x3, 2 tabs  p.o. daily x3, 1 tab p.o. daily x3, one half tab p.o. daily x4 20 tablet 0     Repatha Syringe 140 mg/mL injection INJECT 1 ML UNDER THE SKIN EVERY 14 DAYS       semaglutide (Ozempic) 0.25 mg or 0.5 mg (2 mg/3 mL) pen injector INJECT 0.5 MG UNDER THE SKIN 1 TIME PER WEEK. 3 mL 2    [6] [Held by provider] aspirin, 81 mg, oral, Daily  cetirizine, 10 mg, oral, q AM  [Held by provider] clopidogrel, 75 mg, oral, Daily  dapagliflozin propanediol, 5 mg, oral, Daily  enoxaparin, 40 mg, subcutaneous, q12h PEDRO  icosapent ethyL, 2 g, oral, BID  insulin lispro, 0-10 Units, subcutaneous, TID AC  lisinopril, 10 mg, oral, Daily  metoprolol succinate XL, 25 mg, oral, Nightly  pantoprazole, 40 mg, oral, Daily  [7]    [8] PRN medications: acetaminophen, albuterol, dextrose, dextrose, glucagon, glucagon, polyethylene glycol

## 2025-05-29 NOTE — PROGRESS NOTES
05/29/25 1108   Discharge Planning   Living Arrangements Spouse/significant other;Children   Support Systems Spouse/significant other;Children   Type of Residence Private residence   Who is requesting discharge planning? Provider   Expected Discharge Disposition Home H   Stroke Family Assessment   Stroke Family Assessment Needed No   Intensity of Service   Intensity of Service 0-30 min     Met with pt this morning, introduced myself and role.  Pt admit for cellulitis: wound on rt great toe--ruling out OM, pt has hx of DM.  Podiatry following --plan is for MRI and surgery.  Will follow as pt may need home infusion with long term IV ABX.  Pt aware of possible home needs with IV ABX and wound care--preference for home care is [facility].  Home address, insurance and PCP  verified.    Vanessa Norton RN [facility]

## 2025-05-29 NOTE — CARE PLAN
RN notified NP that patient's BP was 95/48, HR 83. Patient asymptomatic and states she has not had much PO intake today. LR 500ml bolus ordered. Will continue to monitor. Attending to follow.

## 2025-05-29 NOTE — CARE PLAN
Problem: Pain - Adult  Goal: Verbalizes/displays adequate comfort level or baseline comfort level  Outcome: Progressing     Problem: Safety - Adult  Goal: Free from fall injury  Outcome: Progressing     Problem: Discharge Planning  Goal: Discharge to home or other facility with appropriate resources  Outcome: Progressing     Problem: Chronic Conditions and Co-morbidities  Goal: Patient's chronic conditions and co-morbidity symptoms are monitored and maintained or improved  Outcome: Progressing     Problem: Nutrition  Goal: Nutrient intake appropriate for maintaining nutritional needs  Outcome: Progressing   The patient's goals for the shift include      The clinical goals for the shift include iv antibiotics and wound care    Over the shift, the patient did not make progress toward the following goals. Barriers to progression include patient experiencing wound right foot right great toe. Recommendations to address these barriers include iv antibiotics and wound care.

## 2025-05-29 NOTE — ED PROVIDER NOTES
HPI   Chief Complaint   Patient presents with    Wound Infection     Pt states that 3 weeks ago she had a blister that got infected on her right foot. Pt was seen by wound care today and was told to come and get checked out and IV abx. Denies any fevers. States that the blister is from wearing the wrong shoes and is on her bunion. Pt has hx of diabetes with neuropathy.       This is a 49 years old very pleasant female patient presented to the emergency department with a chief complaint of right big toe redness, and pain.  Stated that the condition started 2 to 3 weeks ago.  She went to the wound care center and they referred her to the emergency department to start IV antibiotics.  Stated that she has been diabetic for over 20 years.  Stated that she has been on oral amoxicillin since yesterday.  Reported chills, body aches.  Denies any chest pain, shortness of breath, cough, weakness, numbness, abdominal pain, or urinary symptoms.    Review of system: As stated above in the HPI section.              Patient History   Medical History[1]  Surgical History[2]  Family History[3]  Social History[4]    Physical Exam   ED Triage Vitals   Temperature Heart Rate Respirations BP   05/28/25 1923 05/28/25 1923 05/28/25 1923 05/28/25 1923   36.2 °C (97.2 °F) (!) 113 20 149/79      Pulse Ox Temp Source Heart Rate Source Patient Position   05/28/25 1923 05/28/25 1923 05/28/25 2245 05/28/25 1923   99 % Temporal Monitor Sitting      BP Location FiO2 (%)     -- --             Physical Exam  Constitutional:       Appearance: Normal appearance. She is obese.   HENT:      Head: Normocephalic and atraumatic.      Nose: Nose normal.   Eyes:      Extraocular Movements: Extraocular movements intact.      Pupils: Pupils are equal, round, and reactive to light.   Cardiovascular:      Rate and Rhythm: Normal rate and regular rhythm.      Pulses: Normal pulses.      Heart sounds: Normal heart sounds. No murmur heard.  Pulmonary:      Effort:  Pulmonary effort is normal. No respiratory distress.      Breath sounds: Normal breath sounds. No stridor. No wheezing, rhonchi or rales.   Chest:      Chest wall: No tenderness.   Abdominal:      General: Abdomen is flat. There is no distension.      Tenderness: There is no abdominal tenderness. There is no guarding or rebound.   Musculoskeletal:         General: Swelling and tenderness present. Normal range of motion.      Comments: Redness, swelling, and tenderness at the medial aspect of right first metatarsophalangeal joint, surrounded by erythema, and induration concerning for cellulitis and deep infection.    Intact dorsalis pedis and posterior tibial pulse bilaterally   Skin:     General: Skin is warm and dry.   Neurological:      General: No focal deficit present.      Mental Status: She is alert and oriented to person, place, and time.           ED Course & MDM   Diagnoses as of 25   Cellulitis                 No data recorded     [place] Coma Scale Score: 15 (25 : Virginia Escobedo RN)                           Medical Decision Making  Patient is seen and examined, will obtain x-ray to the right foot as well as the right big toe.  Presentation is concerning for cellulitis versus osteomyelitis.  We initiated Zosyn, and vancomycin.  Patient require admission for IV antibiotics and further management/imaging.    Patient is admitted to medicine team in stable condition.        Procedure  Procedures       Primitivo Payan DO  [id number]         [1]   Past Medical History:  Diagnosis Date    Bunion     Coronary artery disease     Diabetes mellitus type 2 in obese     Essential hypertension, benign     High arches     HLD (hyperlipidemia)     Obesity     STEMI (ST elevation myocardial infarction) (Multi)    [2]   Past Surgical History:  Procedure Laterality Date     SECTION, LOW TRANSVERSE  [id number]    CORONARY STENT PLACEMENT  2019    on plavix    OTHER SURGICAL HISTORY   2022    Tonsillectomy    OTHER SURGICAL HISTORY  2022     section    TOENAIL EXCISION     [3]   Family History  Problem Relation Name Age of Onset    Other (cva [Other] Depression, Bilpoar) Mother Bushra Ibanez     Coronary artery disease Mother Bushra Ibanez     Arthritis Mother Bushra Ibanez     COPD Mother Bushra Ibanez     Depression Mother Bushra Ibanez     Mental illness Mother Bushra Ibanez     Miscarriages / Stillbirths Mother Bushra Ibanez     Stroke Mother Bushra Ibanez     Hypertension Mother Bushra Ibanez     Blood clot Mother Bushra Ibanez     Heart disease Mother Bushra Ibanez     Hernia Mother Bushra Ibanez     Osteoporosis Mother Bushra Ibanez     No Known Problems Father      No Known Problems Sister      Other (Diabetes,HTN) Brother      No Known Problems Son      No Known Problems Maternal Grandmother      Other (Cardiac) Maternal Grandfather      No Known Problems Paternal Grandmother      No Known Problems Paternal Grandfather      Diabetes Brother Laci Ibanez     Hearing loss Brother Laci Ibanez     Diabetes Brother Laci Ibanez     Hearing loss Brother Laci Ibanez    [4]   Social History  Tobacco Use    Smoking status: Former     Current packs/day: 0.00     Types: Cigarettes     Start date:      Quit date:      Years since quittin.4    Smokeless tobacco: Never   Substance Use Topics    Alcohol use: Yes     Comment: Socially- 2 x month    Drug use: Yes     Types: Marijuana     Comment: Ocyasmanyinal        Primitivo Payan DO  25 [phone]

## 2025-05-30 ENCOUNTER — ANESTHESIA EVENT (OUTPATIENT)
Dept: OPERATING ROOM | Facility: HOSPITAL | Age: 50
End: 2025-05-30
Payer: COMMERCIAL

## 2025-05-30 ENCOUNTER — ANESTHESIA (OUTPATIENT)
Dept: OPERATING ROOM | Facility: HOSPITAL | Age: 50
End: 2025-05-30
Payer: COMMERCIAL

## 2025-05-30 ENCOUNTER — APPOINTMENT (OUTPATIENT)
Dept: WOUND CARE | Facility: CLINIC | Age: 50
End: 2025-05-30
Payer: COMMERCIAL

## 2025-05-30 LAB
ABO GROUP (TYPE) IN BLOOD: NORMAL
ANION GAP SERPL CALC-SCNC: 12 MMOL/L (ref 10–20)
ANTIBODY SCREEN: NORMAL
BUN SERPL-MCNC: 23 MG/DL (ref 6–23)
CALCIUM SERPL-MCNC: 9 MG/DL (ref 8.6–10.3)
CHLORIDE SERPL-SCNC: 104 MMOL/L (ref 98–107)
CO2 SERPL-SCNC: 23 MMOL/L (ref 21–32)
CREAT SERPL-MCNC: 1.24 MG/DL (ref 0.5–1.05)
EGFRCR SERPLBLD CKD-EPI 2021: 53 ML/MIN/1.73M*2
ERYTHROCYTE [DISTWIDTH] IN BLOOD BY AUTOMATED COUNT: 14.2 % (ref 11.5–14.5)
GLUCOSE BLD MANUAL STRIP-MCNC: 129 MG/DL (ref 74–99)
GLUCOSE BLD MANUAL STRIP-MCNC: 165 MG/DL (ref 74–99)
GLUCOSE BLD MANUAL STRIP-MCNC: 178 MG/DL (ref 74–99)
GLUCOSE BLD MANUAL STRIP-MCNC: 210 MG/DL (ref 74–99)
GLUCOSE SERPL-MCNC: 176 MG/DL (ref 74–99)
HCT VFR BLD AUTO: 38.3 % (ref 36–46)
HGB BLD-MCNC: 12 G/DL (ref 12–16)
MCH RBC QN AUTO: 27.1 PG (ref 26–34)
MCHC RBC AUTO-ENTMCNC: 31.3 G/DL (ref 32–36)
MCV RBC AUTO: 87 FL (ref 80–100)
NRBC BLD-RTO: 0 /100 WBCS (ref 0–0)
PLATELET # BLD AUTO: 242 X10*3/UL (ref 150–450)
POTASSIUM SERPL-SCNC: 4.3 MMOL/L (ref 3.5–5.3)
RBC # BLD AUTO: 4.43 X10*6/UL (ref 4–5.2)
RH FACTOR (ANTIGEN D): NORMAL
SODIUM SERPL-SCNC: 135 MMOL/L (ref 136–145)
WBC # BLD AUTO: 6.3 X10*3/UL (ref 4.4–11.3)

## 2025-05-30 PROCEDURE — 2500000001 HC RX 250 WO HCPCS SELF ADMINISTERED DRUGS (ALT 637 FOR MEDICARE OP)

## 2025-05-30 PROCEDURE — 0752T DGTZ GLS MCRSCP SLD LVL III: CPT | Mod: TC,PARLAB | Performed by: INTERNAL MEDICINE

## 2025-05-30 PROCEDURE — 2720000007 HC OR 272 NO HCPCS: Performed by: PODIATRIST

## 2025-05-30 PROCEDURE — 1200000002 HC GENERAL ROOM WITH TELEMETRY DAILY

## 2025-05-30 PROCEDURE — 0QBN0ZZ EXCISION OF RIGHT METATARSAL, OPEN APPROACH: ICD-10-PCS | Performed by: PODIATRIST

## 2025-05-30 PROCEDURE — 86900 BLOOD TYPING SEROLOGIC ABO: CPT

## 2025-05-30 PROCEDURE — 82947 ASSAY GLUCOSE BLOOD QUANT: CPT

## 2025-05-30 PROCEDURE — 3600000003 HC OR TIME - INITIAL BASE CHARGE - PROCEDURE LEVEL THREE: Performed by: PODIATRIST

## 2025-05-30 PROCEDURE — 88304 TISSUE EXAM BY PATHOLOGIST: CPT | Performed by: PATHOLOGY

## 2025-05-30 PROCEDURE — 88311 DECALCIFY TISSUE: CPT | Performed by: PATHOLOGY

## 2025-05-30 PROCEDURE — 86901 BLOOD TYPING SEROLOGIC RH(D): CPT

## 2025-05-30 PROCEDURE — 3600000008 HC OR TIME - EACH INCREMENTAL 1 MINUTE - PROCEDURE LEVEL THREE: Performed by: PODIATRIST

## 2025-05-30 PROCEDURE — 2500000002 HC RX 250 W HCPCS SELF ADMINISTERED DRUGS (ALT 637 FOR MEDICARE OP, ALT 636 FOR OP/ED)

## 2025-05-30 PROCEDURE — 2500000002 HC RX 250 W HCPCS SELF ADMINISTERED DRUGS (ALT 637 FOR MEDICARE OP, ALT 636 FOR OP/ED): Mod: JZ | Performed by: ANESTHESIOLOGY

## 2025-05-30 PROCEDURE — 36415 COLL VENOUS BLD VENIPUNCTURE: CPT

## 2025-05-30 PROCEDURE — 2500000004 HC RX 250 GENERAL PHARMACY W/ HCPCS (ALT 636 FOR OP/ED): Mod: JZ

## 2025-05-30 PROCEDURE — 85027 COMPLETE CBC AUTOMATED: CPT | Performed by: INTERNAL MEDICINE

## 2025-05-30 PROCEDURE — 3700000002 HC GENERAL ANESTHESIA TIME - EACH INCREMENTAL 1 MINUTE: Performed by: PODIATRIST

## 2025-05-30 PROCEDURE — 3700000001 HC GENERAL ANESTHESIA TIME - INITIAL BASE CHARGE: Performed by: PODIATRIST

## 2025-05-30 PROCEDURE — 87205 SMEAR GRAM STAIN: CPT | Mod: PARLAB | Performed by: INTERNAL MEDICINE

## 2025-05-30 PROCEDURE — 87077 CULTURE AEROBIC IDENTIFY: CPT | Mod: PARLAB | Performed by: INTERNAL MEDICINE

## 2025-05-30 PROCEDURE — 7100000002 HC RECOVERY ROOM TIME - EACH INCREMENTAL 1 MINUTE: Performed by: PODIATRIST

## 2025-05-30 PROCEDURE — 2500000004 HC RX 250 GENERAL PHARMACY W/ HCPCS (ALT 636 FOR OP/ED): Performed by: PODIATRIST

## 2025-05-30 PROCEDURE — 2500000004 HC RX 250 GENERAL PHARMACY W/ HCPCS (ALT 636 FOR OP/ED): Performed by: ANESTHESIOLOGIST ASSISTANT

## 2025-05-30 PROCEDURE — 80048 BASIC METABOLIC PNL TOTAL CA: CPT | Performed by: INTERNAL MEDICINE

## 2025-05-30 PROCEDURE — 36415 COLL VENOUS BLD VENIPUNCTURE: CPT | Performed by: INTERNAL MEDICINE

## 2025-05-30 PROCEDURE — 7100000001 HC RECOVERY ROOM TIME - INITIAL BASE CHARGE: Performed by: PODIATRIST

## 2025-05-30 RX ORDER — SUCCINYLCHOLINE/SOD CL,ISO/PF 100 MG/5ML
SYRINGE (ML) INTRAVENOUS AS NEEDED
Status: DISCONTINUED | OUTPATIENT
Start: 2025-05-30 | End: 2025-05-30

## 2025-05-30 RX ORDER — LIDOCAINE HCL/PF 100 MG/5ML
SYRINGE (ML) INTRAVENOUS AS NEEDED
Status: DISCONTINUED | OUTPATIENT
Start: 2025-05-30 | End: 2025-05-30

## 2025-05-30 RX ORDER — MIDAZOLAM HYDROCHLORIDE 1 MG/ML
INJECTION, SOLUTION INTRAMUSCULAR; INTRAVENOUS AS NEEDED
Status: DISCONTINUED | OUTPATIENT
Start: 2025-05-30 | End: 2025-05-30

## 2025-05-30 RX ORDER — IPRATROPIUM BROMIDE AND ALBUTEROL SULFATE 2.5; .5 MG/3ML; MG/3ML
3 SOLUTION RESPIRATORY (INHALATION)
Status: DISCONTINUED | OUTPATIENT
Start: 2025-05-30 | End: 2025-05-30 | Stop reason: HOSPADM

## 2025-05-30 RX ORDER — HYDROMORPHONE HYDROCHLORIDE 1 MG/ML
1 INJECTION, SOLUTION INTRAMUSCULAR; INTRAVENOUS; SUBCUTANEOUS EVERY 5 MIN PRN
Status: DISCONTINUED | OUTPATIENT
Start: 2025-05-30 | End: 2025-05-30 | Stop reason: HOSPADM

## 2025-05-30 RX ORDER — PROPOFOL 10 MG/ML
INJECTION, EMULSION INTRAVENOUS AS NEEDED
Status: DISCONTINUED | OUTPATIENT
Start: 2025-05-30 | End: 2025-05-30

## 2025-05-30 RX ORDER — MEPERIDINE HYDROCHLORIDE 50 MG/ML
12.5 INJECTION INTRAMUSCULAR; INTRAVENOUS; SUBCUTANEOUS EVERY 10 MIN PRN
Status: DISCONTINUED | OUTPATIENT
Start: 2025-05-30 | End: 2025-05-30 | Stop reason: HOSPADM

## 2025-05-30 RX ORDER — SODIUM CHLORIDE, SODIUM LACTATE, POTASSIUM CHLORIDE, CALCIUM CHLORIDE 600; 310; 30; 20 MG/100ML; MG/100ML; MG/100ML; MG/100ML
100 INJECTION, SOLUTION INTRAVENOUS CONTINUOUS
Status: DISCONTINUED | OUTPATIENT
Start: 2025-05-30 | End: 2025-05-30 | Stop reason: HOSPADM

## 2025-05-30 RX ORDER — LIDOCAINE HYDROCHLORIDE 10 MG/ML
0.1 INJECTION, SOLUTION INFILTRATION; PERINEURAL ONCE
Status: DISCONTINUED | OUTPATIENT
Start: 2025-05-30 | End: 2025-05-30 | Stop reason: HOSPADM

## 2025-05-30 RX ORDER — CEFAZOLIN SODIUM 2 G/50ML
2 SOLUTION INTRAVENOUS EVERY 8 HOURS
Status: DISCONTINUED | OUTPATIENT
Start: 2025-05-30 | End: 2025-05-30

## 2025-05-30 RX ORDER — IPRATROPIUM BROMIDE AND ALBUTEROL SULFATE 2.5; .5 MG/3ML; MG/3ML
SOLUTION RESPIRATORY (INHALATION)
Status: DISPENSED
Start: 2025-05-30 | End: 2025-05-31

## 2025-05-30 RX ORDER — FENTANYL CITRATE 50 UG/ML
INJECTION, SOLUTION INTRAMUSCULAR; INTRAVENOUS AS NEEDED
Status: DISCONTINUED | OUTPATIENT
Start: 2025-05-30 | End: 2025-05-30

## 2025-05-30 RX ORDER — ONDANSETRON HYDROCHLORIDE 2 MG/ML
INJECTION, SOLUTION INTRAVENOUS AS NEEDED
Status: DISCONTINUED | OUTPATIENT
Start: 2025-05-30 | End: 2025-05-30

## 2025-05-30 RX ORDER — BUPIVACAINE HYDROCHLORIDE 5 MG/ML
INJECTION, SOLUTION PERINEURAL AS NEEDED
Status: DISCONTINUED | OUTPATIENT
Start: 2025-05-30 | End: 2025-05-30 | Stop reason: HOSPADM

## 2025-05-30 RX ORDER — ROCURONIUM BROMIDE 10 MG/ML
INJECTION, SOLUTION INTRAVENOUS AS NEEDED
Status: DISCONTINUED | OUTPATIENT
Start: 2025-05-30 | End: 2025-05-30

## 2025-05-30 RX ORDER — TRAMADOL HYDROCHLORIDE 50 MG/1
50 TABLET, FILM COATED ORAL EVERY 6 HOURS PRN
Status: DISCONTINUED | OUTPATIENT
Start: 2025-05-30 | End: 2025-05-31

## 2025-05-30 RX ORDER — LIDOCAINE HYDROCHLORIDE 20 MG/ML
INJECTION, SOLUTION INFILTRATION; PERINEURAL AS NEEDED
Status: DISCONTINUED | OUTPATIENT
Start: 2025-05-30 | End: 2025-05-30 | Stop reason: HOSPADM

## 2025-05-30 RX ADMIN — Medication 160 MG: at 12:27

## 2025-05-30 RX ADMIN — LISINOPRIL 10 MG: 10 TABLET ORAL at 08:42

## 2025-05-30 RX ADMIN — INSULIN LISPRO 2 UNITS: 100 INJECTION, SOLUTION INTRAVENOUS; SUBCUTANEOUS at 08:42

## 2025-05-30 RX ADMIN — TRAMADOL HYDROCHLORIDE 50 MG: 50 TABLET, COATED ORAL at 08:42

## 2025-05-30 RX ADMIN — ICOSAPENT ETHYL 2 G: 1 CAPSULE ORAL at 16:02

## 2025-05-30 RX ADMIN — CEFAZOLIN SODIUM 2 G: 2 SOLUTION INTRAVENOUS at 11:42

## 2025-05-30 RX ADMIN — ROCURONIUM BROMIDE 10 MG: 50 INJECTION, SOLUTION INTRAVENOUS at 12:27

## 2025-05-30 RX ADMIN — TRAMADOL HYDROCHLORIDE 50 MG: 50 TABLET, COATED ORAL at 22:11

## 2025-05-30 RX ADMIN — TRAMADOL HYDROCHLORIDE 50 MG: 50 TABLET, COATED ORAL at 16:02

## 2025-05-30 RX ADMIN — PANTOPRAZOLE SODIUM 40 MG: 40 TABLET, DELAYED RELEASE ORAL at 08:42

## 2025-05-30 RX ADMIN — PROPOFOL 100 MG: 10 INJECTION, EMULSION INTRAVENOUS at 12:45

## 2025-05-30 RX ADMIN — SODIUM CHLORIDE, SODIUM LACTATE, POTASSIUM CHLORIDE, AND CALCIUM CHLORIDE: .6; .31; .03; .02 INJECTION, SOLUTION INTRAVENOUS at 12:04

## 2025-05-30 RX ADMIN — IPRATROPIUM BROMIDE AND ALBUTEROL SULFATE 3 ML: .5; 3 SOLUTION RESPIRATORY (INHALATION) at 12:08

## 2025-05-30 RX ADMIN — METOPROLOL SUCCINATE 25 MG: 25 TABLET, EXTENDED RELEASE ORAL at 22:11

## 2025-05-30 RX ADMIN — CETIRIZINE HYDROCHLORIDE 10 MG: 10 TABLET, FILM COATED ORAL at 08:42

## 2025-05-30 RX ADMIN — PIPERACILLIN SODIUM AND TAZOBACTAM SODIUM 3.38 G: 3; .375 INJECTION, SOLUTION INTRAVENOUS at 16:18

## 2025-05-30 RX ADMIN — PROPOFOL 200 MG: 10 INJECTION, EMULSION INTRAVENOUS at 12:27

## 2025-05-30 RX ADMIN — FENTANYL CITRATE 100 MCG: 50 INJECTION, SOLUTION INTRAMUSCULAR; INTRAVENOUS at 12:27

## 2025-05-30 RX ADMIN — ONDANSETRON 4 MG: 2 INJECTION, SOLUTION INTRAMUSCULAR; INTRAVENOUS at 12:59

## 2025-05-30 RX ADMIN — MIDAZOLAM 2 MG: 1 INJECTION INTRAMUSCULAR; INTRAVENOUS at 12:25

## 2025-05-30 RX ADMIN — ICOSAPENT ETHYL 2 G: 1 CAPSULE ORAL at 08:43

## 2025-05-30 RX ADMIN — LIDOCAINE HYDROCHLORIDE 100 MG: 20 INJECTION, SOLUTION INTRAVENOUS at 12:27

## 2025-05-30 RX ADMIN — PIPERACILLIN SODIUM AND TAZOBACTAM SODIUM 3.38 G: 3; .375 INJECTION, SOLUTION INTRAVENOUS at 22:11

## 2025-05-30 RX ADMIN — DAPAGLIFLOZIN 5 MG: 5 TABLET, FILM COATED ORAL at 16:18

## 2025-05-30 SDOH — ECONOMIC STABILITY: GENERAL

## 2025-05-30 SDOH — ECONOMIC STABILITY: HOUSING INSECURITY
IN THE LAST 12 MONTHS, WAS THERE A TIME WHEN YOU DID NOT HAVE A STEADY PLACE TO SLEEP OR SLEPT IN A SHELTER (INCLUDING NOW)?: NO

## 2025-05-30 SDOH — ECONOMIC STABILITY: HOUSING INSECURITY

## 2025-05-30 SDOH — ECONOMIC STABILITY: FOOD INSECURITY: WITHIN THE PAST 12 MONTHS, YOU WORRIED THAT YOUR FOOD WOULD RUN OUT BEFORE YOU GOT MONEY TO BUY MORE.: PATIENT DECLINED

## 2025-05-30 SDOH — ECONOMIC STABILITY: HOUSING INSECURITY: IN THE LAST 12 MONTHS, HOW MANY PLACES HAVE YOU LIVED?: 1

## 2025-05-30 SDOH — ECONOMIC STABILITY: HOUSING INSECURITY: IN THE PAST 12 MONTHS HAS THE ELECTRIC, GAS, OIL, OR WATER COMPANY THREATENED TO SHUT OFF SERVICES IN YOUR HOME?: NO

## 2025-05-30 SDOH — HEALTH STABILITY: MENTAL HEALTH: CURRENT SMOKER: 0

## 2025-05-30 SDOH — ECONOMIC STABILITY: TRANSPORTATION INSECURITY
IN THE PAST 12 MONTHS, HAS THE LACK OF TRANSPORTATION KEPT YOU FROM MEDICAL APPOINTMENTS OR FROM GETTING MEDICATIONS?: NO

## 2025-05-30 SDOH — ECONOMIC STABILITY: TRANSPORTATION INSECURITY

## 2025-05-30 SDOH — ECONOMIC STABILITY: INCOME INSECURITY: IN THE LAST 12 MONTHS, WAS THERE A TIME WHEN YOU WERE NOT ABLE TO PAY THE MORTGAGE OR RENT ON TIME?: NO

## 2025-05-30 SDOH — ECONOMIC STABILITY: FOOD INSECURITY

## 2025-05-30 SDOH — ECONOMIC STABILITY: HOUSING INSECURITY: IN THE LAST 12 MONTHS, WAS THERE A TIME WHEN YOU WERE NOT ABLE TO PAY THE MORTGAGE OR RENT ON TIME?: NO

## 2025-05-30 SDOH — ECONOMIC STABILITY: FOOD INSECURITY: WITHIN THE PAST 12 MONTHS, THE FOOD YOU BOUGHT JUST DIDN'T LAST AND YOU DIDN'T HAVE MONEY TO GET MORE.: PATIENT DECLINED

## 2025-05-30 SDOH — ECONOMIC STABILITY: FOOD INSECURITY
WITHIN THE PAST 12 MONTHS, YOU WORRIED THAT YOUR FOOD WOULD RUN OUT BEFORE YOU GOT THE MONEY TO BUY MORE.: PATIENT DECLINED

## 2025-05-30 SDOH — ECONOMIC STABILITY: TRANSPORTATION INSECURITY
IN THE PAST 12 MONTHS, HAS LACK OF TRANSPORTATION KEPT YOU FROM MEETINGS, WORK, OR FROM GETTING THINGS NEEDED FOR DAILY LIVING?: NO

## 2025-05-30 SDOH — ECONOMIC STABILITY: TRANSPORTATION INSECURITY: IN THE PAST 12 MONTHS, HAS LACK OF TRANSPORTATION KEPT YOU FROM MEDICAL APPOINTMENTS OR FROM GETTING MEDICATIONS?: NO

## 2025-05-30 ASSESSMENT — COGNITIVE AND FUNCTIONAL STATUS - GENERAL
MOBILITY SCORE: 24
DAILY ACTIVITIY SCORE: 24

## 2025-05-30 ASSESSMENT — PAIN SCALES - GENERAL
PAINLEVEL_OUTOF10: 0 - NO PAIN
PAINLEVEL_OUTOF10: 3
PAINLEVEL_OUTOF10: 0 - NO PAIN
PAIN_LEVEL: 0

## 2025-05-30 ASSESSMENT — PAIN - FUNCTIONAL ASSESSMENT
PAIN_FUNCTIONAL_ASSESSMENT: 0-10

## 2025-05-30 ASSESSMENT — SOCIAL DETERMINANTS OF HEALTH (SDOH): IN THE PAST 12 MONTHS, HAS THE ELECTRIC, GAS, OIL, OR WATER COMPANY THREATENED TO SHUT OFF SERVICE IN YOUR HOME?: NO

## 2025-05-30 ASSESSMENT — PAIN DESCRIPTION - DESCRIPTORS: DESCRIPTORS: ACHING

## 2025-05-30 ASSESSMENT — ACTIVITIES OF DAILY LIVING (ADL): LACK_OF_TRANSPORTATION: NO

## 2025-05-30 NOTE — CARE PLAN
The patient's goals for the shift include      The clinical goals for the shift include iv antibiotics and wound care    Over the shift, the patient did not make progress toward the following goals. Barriers to progression include Pt has open diabetic ulcer to R big toe. Recommendations to address these barriers include Maintain safety; Maintain stable vitals.

## 2025-05-30 NOTE — H&P
History Of Present Illness  Patricia Gibbs is a 49 y.o. female with a past medical history of DM2, CAD/STEMI s/p stent, HTN, HLD, lung nodule, obesity, who presented to [facility] ED today with right foot wound. Patient was sent in by Dr. Eden's office for MRI and IV antibiotics with concern for osteomyelitis. Wound culture obtained in office and wound was mechanically debrided with saline and gauze in office. Patient states the wound started off as a blister about 3 weeks ago from wearing poorly fitted shoes that irritated her bunion. States the blister opened and then continued to get worse. She saw Dr. Romo in office yesterday and showed her the wound, and she sent her to the wound care clinic for management. She was also treated in the office for an URI/bronchitis and started on Amoxicillin. She states she woke up today and the foot wound was worse with swelling and redness and tender. States she has a headache and has had some diarrhea, mainly during coughing fits. Denies fever, chills, chest pain, shortness of breath, abdominal pain, nausea, vomiting, urinary symptoms, or constipation. PCP: Dr. Romo. Podiatrist: Dr. Eden.    ED course: Hemodynamically stable. Afebrile. /79 with , RR 20, 99% RA. EKG unavailable for my review. Labs: glucose 187. Creatinine 1.45, GFR 44. WBC 13.8, neutrophils 11.36. ESR 70, CRP 29.35. Blood cultures ordered. See imaging results. Zosyn, vancomycin given in ED. Pt is being admitted to Dr. Cecil Ibanez who will continue to follow pt. I was asked to do the H&P and initial assessment.     Past Medical History  Medical History[1]    Surgical History  Surgical History[2]     Social History  She reports that she quit smoking about 21 years ago. Her smoking use included cigarettes. She started smoking about 31 years ago. She has never used smokeless tobacco. She reports current alcohol use. She reports current drug use. Drug:  "Marijuana.    Family History  Family History[3]     Allergies  Bee venom protein (honey bee), Codeine, and Montelukast    Review of Systems  10 Point review of systems was performed and is negative except for what is stated in the HPI above.   Physical Exam  Constitutional:       Appearance: Normal appearance. She is obese.   HENT:      Head: Normocephalic and atraumatic.      Nose: Nose normal.      Mouth/Throat:      Mouth: Mucous membranes are moist.      Pharynx: Oropharynx is clear.   Eyes:      Extraocular Movements: Extraocular movements intact.      Conjunctiva/sclera: Conjunctivae normal.      Pupils: Pupils are equal, round, and reactive to light.   Cardiovascular:      Rate and Rhythm: Normal rate and regular rhythm.      Pulses: Normal pulses.      Heart sounds: Normal heart sounds.   Pulmonary:      Effort: Pulmonary effort is normal.      Breath sounds: Normal breath sounds.   Abdominal:      General: Bowel sounds are normal.      Palpations: Abdomen is soft.   Musculoskeletal:         General: Normal range of motion.      Cervical back: Normal range of motion and neck supple.   Skin:     General: Skin is warm and dry.      Capillary Refill: Capillary refill takes 2 to 3 seconds.      Comments: Right 1st digit with significant erythema and edema with serosanguineoious drainage noted.  Fat layer exposed, no tunneling. Erythema extends to 4th digit and extends up partial forefoot. MSP's intact.   Neurological:      General: No focal deficit present.      Mental Status: She is alert and oriented to person, place, and time.   Psychiatric:         Mood and Affect: Mood normal.         Behavior: Behavior normal.         Thought Content: Thought content normal.         Judgment: Judgment normal.          Last Recorded Vitals  Blood pressure 130/80, pulse 102, temperature 36.6 °C (97.9 °F), temperature source Temporal, resp. rate 16, height 1.626 m (5' 4.02\"), weight 125 kg (275 lb 9.2 oz), SpO2 " 95%.    Relevant Results  Results for orders placed or performed during the hospital encounter of 05/28/25 (from the past 24 hours)   POCT GLUCOSE   Result Value Ref Range    POCT Glucose 253 (H) 74 - 99 mg/dL   POCT GLUCOSE   Result Value Ref Range    POCT Glucose 177 (H) 74 - 99 mg/dL   CBC   Result Value Ref Range    WBC 8.4 4.4 - 11.3 x10*3/uL    nRBC 0.0 0.0 - 0.0 /100 WBCs    RBC 4.51 4.00 - 5.20 x10*6/uL    Hemoglobin 12.4 12.0 - 16.0 g/dL    Hematocrit 39.0 36.0 - 46.0 %    MCV 87 80 - 100 fL    MCH 27.5 26.0 - 34.0 pg    MCHC 31.8 (L) 32.0 - 36.0 g/dL    RDW 14.4 11.5 - 14.5 %    Platelets 218 150 - 450 x10*3/uL   Basic metabolic panel   Result Value Ref Range    Glucose 240 (H) 74 - 99 mg/dL    Sodium 137 136 - 145 mmol/L    Potassium 4.2 3.5 - 5.3 mmol/L    Chloride 103 98 - 107 mmol/L    Bicarbonate 24 21 - 32 mmol/L    Anion Gap 14 10 - 20 mmol/L    Urea Nitrogen 23 6 - 23 mg/dL    Creatinine 1.37 (H) 0.50 - 1.05 mg/dL    eGFR 47 (L) >60 mL/min/1.73m*2    Calcium 9.0 8.6 - 10.3 mg/dL   POCT GLUCOSE   Result Value Ref Range    POCT Glucose 165 (H) 74 - 99 mg/dL   POCT GLUCOSE   Result Value Ref Range    POCT Glucose 164 (H) 74 - 99 mg/dL   POCT GLUCOSE   Result Value Ref Range    POCT Glucose 189 (H) 74 - 99 mg/dL     XR foot right 3+ views  Result Date: 5/28/2025  Interpreted By:  Alexa Cline, STUDY: XR FOOT RIGHT 3+ VIEWS; ;  5/28/2025 10:44 pm   INDICATION: Signs/Symptoms:diabetic foot ulcer, medial aspect of the right first toe.   COMPARISON: Right foot radiographs 02/19/2025, right foot CT 03/06/2025   ACCESSION NUMBER(S): [id number]   ORDERING CLINICIAN: KAZ KELLY   FINDINGS: Three views right foot and two views right great toe: The hallux valgus with lateral subluxation of the 1st MTP joint again noted. There are chronic fracture deformities of the 4th and 5th metatarsals. Flattening and irregularity of the 2nd metatarsal head consistent with chronic Freiberg infraction. There is a  periarticular erosion of the great toe, seen on prior studies. Severe midfoot osteoarthrosis noted. No osseous destruction. There is a plantar calcaneal spur.   Nonspecific soft tissue swelling of the dorsum of the foot and great toe. There appears to be an ulceration at the base of the great toe. No soft tissue gas or radiopaque foreign body.       Soft tissue swelling of the dorsum of the foot and great toe with ulceration at the level of the 1st MTP joint.   No radiographic evidence of osteomyelitis.  MRI is more sensitive for early changes of osteomyelitis and may be considered if clinically indicated.   Degenerative and posttraumatic changes as above.     MACRO: None   Signed by: Alexa Cline 5/28/2025 11:19 PM Dictation workstation:   [id number]    XR toe right 2+ views  Result Date: 5/28/2025  Interpreted By:  Alexa Cline, STUDY: XR FOOT RIGHT 3+ VIEWS; ;  5/28/2025 10:44 pm   INDICATION: Signs/Symptoms:diabetic foot ulcer, medial aspect of the right first toe.   COMPARISON: Right foot radiographs 02/19/2025, right foot CT 03/06/2025   ACCESSION NUMBER(S): [id number]   ORDERING CLINICIAN: KAZ KELLY   FINDINGS: Three views right foot and two views right great toe: The hallux valgus with lateral subluxation of the 1st MTP joint again noted. There are chronic fracture deformities of the 4th and 5th metatarsals. Flattening and irregularity of the 2nd metatarsal head consistent with chronic Freiberg infraction. There is a periarticular erosion of the great toe, seen on prior studies. Severe midfoot osteoarthrosis noted. No osseous destruction. There is a plantar calcaneal spur.   Nonspecific soft tissue swelling of the dorsum of the foot and great toe. There appears to be an ulceration at the base of the great toe. No soft tissue gas or radiopaque foreign body.       Soft tissue swelling of the dorsum of the foot and great toe with ulceration at the level of the 1st MTP joint.   No radiographic  evidence of osteomyelitis.  MRI is more sensitive for early changes of osteomyelitis and may be considered if clinically indicated.   Degenerative and posttraumatic changes as above.     MACRO: None   Signed by: Alexa Cline 5/28/2025 11:19 PM Dictation workstation:   [id number]    XR chest 2 views  Result Date: 5/28/2025  Interpreted By:  Quinton Abdalla, STUDY: XR CHEST 2 VIEWS;  5/28/2025 7:48 pm   INDICATION: Signs/Symptoms:Cough, SOB.     COMPARISON: 10/03/2024   ACCESSION NUMBER(S): [id number]   ORDERING CLINICIAN: JAVIER BAUMANN   FINDINGS:     The cardiomediastinal silhouette and pulmonary vasculature are within normal limits. Right coronary stent noted.   No consolidation, pleural effusion or pneumothorax.         No acute cardiopulmonary process.     MACRO: None.   Signed by: Quinton Abdalla 5/28/2025 7:51 PM Dictation workstation:   [id number]     Assessment & Plan  Cellulitis    Diabetes (Multi)    SAMANTHA (acute kidney injury)    Elevated C-reactive protein (CRP)    Leukocytosis    Acute osteomyelitis of right foot (Multi)    49 year old female who presented to ED today with infected right foot. Podiatry consulted. Concern for osteomyelitis. Continue antibiotics. Wound culture ordered. Patient to be admitted to hospital for further medical management.    Cellulitis of right great toe  Diabetic ulcer  SAMANTHA  Elevated inflammatory markers  Leukocytosis  Severe Charcot deformity of right foot  Admit to inpatient/telemetry to Dr. Cecil Ibanez  Podiatry consult and appreciate recs  See imaging results  Wound culture obtained in podiatry office  Follow blood cultures  Continue Zosyn and vancomycin  SSI with hypoglycemic protocol  Repeat labs in AM    Chronic conditions  #CAD, HTN, HLD, lung nodule  Continue home meds as appropriate when nursing completes home med rec.   Full code    DVT prophylaxis  Lovenox SQ  SCD's, as tolerated    I spent 40 minutes in the professional and overall care of this  patient.    Cecil Ibanez DO         [1]   Past Medical History:  Diagnosis Date    Bunion     Coronary artery disease     Diabetes mellitus type 2 in obese     Essential hypertension, benign     High arches     HLD (hyperlipidemia)     Obesity     STEMI (ST elevation myocardial infarction) (Multi)    [2]   Past Surgical History:  Procedure Laterality Date     SECTION, LOW TRANSVERSE  [id number]    CORONARY STENT PLACEMENT  2019    on plavix    OTHER SURGICAL HISTORY  2022    Tonsillectomy    OTHER SURGICAL HISTORY  2022     section    TOENAIL EXCISION     [3]   Family History  Problem Relation Name Age of Onset    Other (cva [Other] Depression, [place]) Mother Bushra Ibanez     Coronary artery disease Mother Bushra Ibanez     Arthritis Mother Bushra Ibanez     COPD Mother Bushra Ibanez     Depression Mother Bushra Ibanez     Mental illness Mother Bushra Ibaenz     Miscarriages / Stillbirths Mother Bushra Ibanez     Stroke Mother Bushra Ibanez     Hypertension Mother Bushra Ibanez     Blood clot Mother Bushra Ibanez     Heart disease Mother Bushra Ibanez     Hernia Mother Bushra Ibanez     Osteoporosis Mother Bushra Ibanez     No Known Problems Father      No Known Problems Sister      Other (Diabetes,HTN) Brother      No Known Problems Son      No Known Problems Maternal Grandmother      Other (Cardiac) Maternal Grandfather      No Known Problems Paternal Grandmother      No Known Problems Paternal Grandfather      Diabetes Brother Laci Ibanez     Hearing loss Brother Laci Ibanez     Diabetes Brother Laci Ibanez     Hearing loss Brother Laci Ibanez

## 2025-05-30 NOTE — PROGRESS NOTES
Patricia Gibbs is a 49 y.o. female on day 2 of admission presenting with Cellulitis.      Subjective   No events overnight. Patient getting surgery today.        Objective     Last Recorded Vitals  /66   Pulse 79   Temp 35.2 °C (95.4 °F)   Resp 18   Wt 125 kg (275 lb 9.2 oz)   SpO2 96%   Intake/Output last 3 Shifts:    Intake/Output Summary (Last 24 hours) at 5/30/2025 1813  Last data filed at 5/30/2025 1343  Gross per 24 hour   Intake 950 ml   Output --   Net 950 ml       Admission Weight  Weight: 125 kg (275 lb) (05/28/25 1923)    Daily Weight  05/30/25 : 125 kg (275 lb 9.2 oz)    Image Results  MR foot right w and wo IV contrast  Narrative: Interpreted By:  Jose Saxena,   STUDY:  MRI of the right foot without and with IV contrast;      INDICATION:  Signs/Symptoms:concern for osteomyelitis          COMPARISON:  05/28/2025      ACCESSION NUMBER(S):  [id number]      ORDERING CLINICIAN:  JESS BERMUDEZ      TECHNIQUE:  Multiplanar multisequence MRI of the  right foot was performed  without and with IV contrast.      FINDINGS:  Soft tissues: There is a soft tissue ulcer adjacent to the 1st  metatarsophalangeal joint medially. No discrete rim enhancing fluid  collection to suggest abscess. Moderate volume 1st intermetatarsal  bursal fluid.      Bones:  Severe degenerative changes of the midfoot with fragmentation  and disorganization, likely reflecting sequela of neuropathic  arthropathy. There is associated bone marrow edema like signal. There  are subacute to remote fracture deformities of the 4th and 5th  metatarsal shafts.      Hallux valgus. There is marrow edema of the 1st metatarsal head and  neck without T1 signal loss. There is associated contrast  enhancement. Small 1st metatarsophalangeal joint effusion.      Severe 2nd metatarsophalangeal degenerative change.      Muscles:  Edema and atrophy of the plantar foot musculature,  compatible with diabetic myopathy and/or myositis.       Miscellaneous:  Visualized tendons and Lisfranc ligament are grossly  intact.      Impression: Hallux valgus and soft tissue ulcer/cellulitis with high likelihood  of osteomyelitis involving the 1st metatarsal head and neck.      Findings of the midfoot suggestive of neuropathic arthropathy.      Subacute to chronic fracture deformities of the 4th and 5th  metatarsal shafts.      Additional ancillary findings, as above.      MACRO:  None      Signed by: Jose Saxena 5/29/2025 1:41 PM  Dictation workstation:   [id number]      Physical Exam  Constitutional:       Appearance: Normal appearance. She is obese.   HENT:      Head: Normocephalic and atraumatic.      Nose: Nose normal.      Mouth/Throat:      Mouth: Mucous membranes are moist.      Pharynx: Oropharynx is clear.   Eyes:      Extraocular Movements: Extraocular movements intact.      Conjunctiva/sclera: Conjunctivae normal.      Pupils: Pupils are equal, round, and reactive to light.   Cardiovascular:      Rate and Rhythm: Normal rate and regular rhythm.      Pulses: Normal pulses.      Heart sounds: Normal heart sounds.   Pulmonary:      Effort: Pulmonary effort is normal.      Breath sounds: Normal breath sounds.   Abdominal:      General: Bowel sounds are normal.      Palpations: Abdomen is soft.   Musculoskeletal:         General: Normal range of motion.      Cervical back: Normal range of motion and neck supple.   Skin:     General: Skin is warm and dry.      Capillary Refill: Capillary refill takes 2 to 3 seconds.      Comments: Right 1st digit with significant erythema and edema with serosanguineoious drainage noted.  Fat layer exposed, no tunneling. Erythema extends to 4th digit and extends up partial forefoot. MSP's intact.   Neurological:      General: No focal deficit present.      Mental Status: She is alert and oriented to person, place, and time.   Psychiatric:         Mood and Affect: Mood normal.         Behavior: Behavior normal.          Thought Content: Thought content normal.         Judgment: Judgment normal.      Relevant Results               This patient currently has cardiac telemetry ordered; if you would like to modify or discontinue the telemetry order, click here to go to the orders activity to modify/discontinue the order.              Assessment & Plan  Cellulitis    Diabetes (Multi)    SAMANTHA (acute kidney injury)    Elevated C-reactive protein (CRP)    Leukocytosis    Acute osteomyelitis of right foot (Multi)    49 year old female who presented to ED today with infected right foot. Podiatry consulted. Concern for osteomyelitis. Continue antibiotics. Wound culture ordered. Patient to be admitted to hospital for further medical management.     Cellulitis of right great toe  Diabetic ulcer  SAMANTHA  Elevated inflammatory markers  Leukocytosis  Severe Charcot deformity of right foot  Admit to inpatient/telemetry to Dr. Cecil Ibanez  Podiatry consult and appreciate recs  See imaging results  Wound culture obtained in podiatry office  Follow blood cultures  Continue Zosyn and vancomycin  SSI with hypoglycemic protocol  Repeat labs in AM     Chronic conditions  #CAD, HTN, HLD, lung nodule  Continue home meds as appropriate when nursing completes home med rec.         Full code     DVT prophylaxis  Lovenox SQ  SCD's, as tolerated     5/30: MRI showed high likelihood of OM of right great toe. Patient underwent I&D with bone resection of right great toe. Continue IV antibiotics for now. Will discuss antibiotic regimen for discharge with podiatry.            Cecil Ibanez DO

## 2025-05-30 NOTE — OP NOTE
RIGHT FOOT INCISION AND DRAINAGE WITH BONE (R) Operative Note     Date: 2025 - 2025  OR Location: [facility] OR    Name: Patricia Gibbs, : 1975, Age: 49 y.o., MRN: [id number], Sex: female    Diagnosis  Pre-op Diagnosis      * Acute osteomyelitis of right foot (Multi) [M86.171] Post-op Diagnosis     * Acute osteomyelitis of right foot (Multi) [M86.171]     Procedures  RIGHT FOOT INCISION AND DRAINAGE WITH BONE  78082 - ME INCISION BONE CORTEX FOOT      Surgeons      * Carla Eden - Primary    Resident/Fellow/Other Assistant:  Surgeons and Role:  * No surgeons found with a matching role *    Staff:   Circulator: Ingrid Villalpando Person: Leif    Anesthesia Staff: Anesthesiologist: Miguel Singh MD  C-AA: GEORGES Coy    Procedure Summary  Anesthesia: General  ASA: III  Estimated Blood Loss: 0mL  Intra-op Medications:   Administrations occurring from 1200 to 1310 on 25:   Medication Name Total Dose   lidocaine (Xylocaine) 20 mg/mL (2 %) injection 5 mL   BUPivacaine HCl (Marcaine) 0.5 % (5 mg/mL) injection 5 mL   fentaNYL (Sublimaze) injection 50 mcg/mL 100 mcg   ipratropium-albuteroL (Duo-Neb) 0.5-2.5 mg/3 mL nebulizer solution 3 mL 3 mL   LR bolus Cannot be calculated   lidocaine (cardiac) injection 2% prefilled syringe 100 mg   midazolam (Versed) injection 1 mg/mL 2 mg   ondansetron (Zofran) 2 mg/mL injection 4 mg   propofol (Diprivan) injection 10 mg/mL 300 mg   rocuronium (ZeMuron) 50 mg/5 mL injection 10 mg   succinylcholine PF in sodium chloride IV syringe 160 mg              Anesthesia Record               Intraprocedure I/O Totals       None           Specimen:   ID Type Source Tests Collected by Time   1 : RIGHT 1ST METATARSAL BONE & TISSUE Tissue DIGIT FIRST, RIGHT FOOT SURGICAL PATHOLOGY EXAM Carla Eden DPM 2025 1255   A : RIGHT 1ST MPJ FOR CULTURE Swab DIGIT, ACCESSORY RIGHT FOOT TISSUE/WOUND CULTURE/SMEAR Carla Eden DPM 2025 1249   B : RIGHT 1ST  METATARSAL BONE FOR CULTURE Tissue DIGIT, ACCESSORY RIGHT FOOT TISSUE/WOUND CULTURE/SMEAR Carla Eden DPM 5/30/2025 1252                 Drains and/or Catheters: * None in log *    Tourniquet Times:     Total Tourniquet Time Documented:  area (laterality) - 19 minutes  Total: area (laterality) - 19 minutes      Implants:     Findings:  MRI positive, osteo 1st MPJ right foot DFU right foot    Indications: Patricia Gibbs is an 49 y.o. female who is having surgery for Acute osteomyelitis of right foot (Multi) [M86.171].     The patient was seen in the preoperative area. The risks, benefits, complications, treatment options, non-operative alternatives, expected recovery and outcomes were discussed with the patient. The possibilities of reaction to medication, pulmonary aspiration, injury to surrounding structures, bleeding, recurrent infection, the need for additional procedures, failure to diagnose a condition, and creating a complication requiring transfusion or operation were discussed with the patient. The patient concurred with the proposed plan, giving informed consent.  The site of surgery was properly noted/marked if necessary per policy. The patient has been actively warmed in preoperative area. Preoperative antibiotics have been ordered and given within 1 hours of incision. Venous thrombosis prophylaxis are not indicated.    Procedure Details: 49-year-old female admitted through the ER yesterday I saw her in the wound center several days ago for a severe diabetic foot infection right foot MRI confirms osteomyelitis.  Significant Charcot deformity peripheral neuropathy morbid obesity uncontrolled diabetes.  Patient is currently on Ozempic which is causing some anesthesia issues she will be done under general.  Clinically she has significantly swollen right forefoot with erythema purulence and a grade 3 DFU right foot along the first MPJ.  Significant hallux valgus deformity and Charcot fractures are  noted.  Consent for review signed and witnessed have explained the patient she needs an urgent I&D with possible amputation in the future for limb salvage.  Will try all efforts at trying to salvage the hallux.  Patient agreeable huddle and timeout completed.    Taken the OR placed on table supine position administered general anesthesia by the anesthesiologist right foot was then prepped draped usual aseptic manner.  Attention then directed right foot overlying the first MPJ ulcer identified which probes to bone.  Semielliptical fishmouth incision made 3:1 ratio overlying the ulcer skin and also excised in toto deep dissection performed into the capsule.  Significant   purulence was noted within the joint which culture was taken further dissection completed the lateral interspace was dissected the medial eminence which was soft and friable consistent with osteo the medial eminence was resected with sagittal saw specimen sent for culture and set specimen sent for bone pathology 6 L of normal saline pulse lavage irrigation for complete evacuation of the joint the wound was then packed open with quarter inch Nu Gauze soaked in saline retention sutures.  Betadine dressing applied.    Taken recovery via stable good condition Toller procedure anesthesia well should be transferred back to room for continued IV antibiotics she will be brought back to surgery in several days for delayed primary closure as long as the wound is viable.  Will get infectious disease on consult and she will probably most likely need a PICC line long-term IV antibiotics.  Evidence of Infection: Yes; Abscess Below the level of the fascia (organ/space)  Complications:  None; patient tolerated the procedure well.    Disposition: PACU - hemodynamically stable.  Condition: stable                 Additional Details:     Attending Attestation: I was present and scrubbed for the entire procedure.    Carla Eden  Phone Number: [phone]

## 2025-05-30 NOTE — PROGRESS NOTES
MRI confirms OM--plan today is for OR per podiatry---I&D with bone resection.  Will have nursing ask for therapy order.  Pt may need wound vac and /or long term IV ABX--if so [facility] home infusion is of preference---unable to reach out as not sure if pt will need home infusion, also will need ID consult.    Vanessa Norton RN [facility]

## 2025-05-30 NOTE — ANESTHESIA PREPROCEDURE EVALUATION
Patient: Patricia Gibbs    Procedure Information       Date/Time: 05/30/25 1200    Procedure: RIGHT FOOT INCISION AND DRAINAGE WITH BONE (Right)    Location: [facility] 07 / [facility]    Surgeons: Carla Eden DPM            Relevant Problems   Anesthesia (within normal limits)      Cardiac   (+) Coronary artery disease involving native coronary artery of native heart with unstable angina pectoris   (+) Hypertension   (+) Mixed hyperlipidemia   (+) STEMI (ST elevation myocardial infarction) (Multi)   (+) Stented coronary artery      Pulmonary   (+) SOB (shortness of breath) on exertion      Endocrine   (+) Controlled type 2 diabetes mellitus without complication      HEENT   (+) Acute non-recurrent pansinusitis   (+) Sinus congestion      ID   (+) Acute osteomyelitis of right foot (Multi)      GYN   (+) Menorrhagia       Clinical information reviewed:    Allergies  Meds  Problems    OB Status           NPO Detail:  NPO/Void Status  Date of Last Liquid: 05/30/25  Time of Last Liquid: 0930  Date of Last Solid: 05/29/25  Time of Last Solid: 1800         Physical Exam    Airway  Mallampati: II  TM distance: >3 FB  Neck ROM: full  Mouth opening: 3 or more finger widths     Cardiovascular - normal exam   Dental - normal exam     Pulmonary (+) wheezes     Abdominal (+) obese             Anesthesia Plan    History of general anesthesia?: yes  History of complications of general anesthesia?: no    ASA 3     general     The patient is not a current smoker.  Patient was previously instructed to abstain from smoking on day of procedure.  Patient did not smoke on day of procedure.    intravenous induction   Postoperative pain plan includes opioids.  Trial extubation is planned.  Anesthetic plan and risks discussed with patient.  Use of blood products discussed with patient who consented to blood products.    Plan discussed with CAA.

## 2025-05-30 NOTE — SIGNIFICANT EVENT
S/P Right Foot Incision and Drainage with Bone Resection    - Please restart all medications including antibiotics, anticoagulants, and/or pain medication as per Medicine/ID.  - Restart prior appropriate diet.  - WBAT To Heel to surgical extremity.  - May elevate surgical extremity.  - Please keep dressing clean, dry and intact with post-op shoegear.  - May reinforce dressings with 4x4s, ABDs, Kerlix, and light ACE wrap for strikethrough bleeding.  - Podiatry to change dressing daily.    Olivier Perales DPM PGY-3  Podiatric Medicine & Surgery  [facility]

## 2025-05-30 NOTE — ANESTHESIA PROCEDURE NOTES
Airway  Date/Time: 5/30/2025 12:30 PM  Reason: elective    Airway not difficult    Staffing  Performed: GEORGES   Authorized by: Vikas Ferrera MD    Performed by: GEORGES Coy  Patient location during procedure: OR    Patient Condition  Indications for airway management: anesthesia  Patient position: sniffing  MILS maintained throughout  Planned trial extubation  Sedation level: deep     Final Airway Details   Preoxygenated: yes  Final airway type: endotracheal airway  Successful airway: ETT  Cuffed: yes   Successful intubation technique: video laryngoscopy  Adjuncts used in placement: intubating stylet and cricoid pressure  Endotracheal tube insertion site: oral  Blade: Nydia  Blade size: #4  ETT size (mm): 7.0  Cormack-Lehane Classification: grade I - full view of glottis  Placement verified by: chest auscultation and capnometry   Cuff volume (mL): 10  Measured from: lips  ETT to lips (cm): 22  Number of attempts at approach: 1  Number of other approaches attempted: 0    Additional Comments  Canchola mac4. RSI.

## 2025-05-30 NOTE — ANESTHESIA POSTPROCEDURE EVALUATION
Patient: Patricia Gibbs    Procedure Summary       Date: 05/30/25 Room / Location: [facility] / [facility]    Anesthesia Start: 1223 Anesthesia Stop: 1317    Procedure: RIGHT FOOT INCISION AND DRAINAGE WITH BONE (Right: Foot) Diagnosis:       Acute osteomyelitis of right foot (Multi)      (Acute osteomyelitis of right foot (Multi) [M86.171])    Surgeons: Carla Eden DPM Responsible Provider: Miguel Singh MD    Anesthesia Type: general ASA Status: 3            Anesthesia Type: general    Vitals Value Taken Time   /66 05/30/25 14:00   Temp 36.4 °C (97.5 °F) 05/30/25 14:00   Pulse 83 05/30/25 14:11   Resp 18 05/30/25 14:00   SpO2 100 % 05/30/25 14:11   Vitals shown include unfiled device data.    Anesthesia Post Evaluation    Patient location during evaluation: PACU  Patient participation: complete - patient participated  Level of consciousness: awake and alert  Pain score: 0  Pain management: adequate  Airway patency: patent  Cardiovascular status: acceptable  Respiratory status: acceptable  Hydration status: acceptable  Postoperative Nausea and Vomiting: none        No notable events documented.

## 2025-05-31 ENCOUNTER — APPOINTMENT (OUTPATIENT)
Dept: RADIOLOGY | Facility: HOSPITAL | Age: 50
DRG: 629 | End: 2025-05-31
Payer: COMMERCIAL

## 2025-05-31 LAB
ANION GAP SERPL CALC-SCNC: 12 MMOL/L (ref 10–20)
BACTERIA SPEC AEROBE CULT: ABNORMAL
BACTERIA SPEC ANAEROBE CULT: ABNORMAL
BACTERIA SPEC CULT: ABNORMAL
BUN SERPL-MCNC: 26 MG/DL (ref 6–23)
CALCIUM SERPL-MCNC: 8.7 MG/DL (ref 8.6–10.3)
CHLORIDE SERPL-SCNC: 105 MMOL/L (ref 98–107)
CO2 SERPL-SCNC: 24 MMOL/L (ref 21–32)
CREAT SERPL-MCNC: 1.53 MG/DL (ref 0.5–1.05)
EGFRCR SERPLBLD CKD-EPI 2021: 42 ML/MIN/1.73M*2
ERYTHROCYTE [DISTWIDTH] IN BLOOD BY AUTOMATED COUNT: 13.9 % (ref 11.5–14.5)
GLUCOSE BLD MANUAL STRIP-MCNC: 125 MG/DL (ref 74–99)
GLUCOSE BLD MANUAL STRIP-MCNC: 140 MG/DL (ref 74–99)
GLUCOSE BLD MANUAL STRIP-MCNC: 183 MG/DL (ref 74–99)
GLUCOSE BLD MANUAL STRIP-MCNC: 278 MG/DL (ref 74–99)
GLUCOSE SERPL-MCNC: 180 MG/DL (ref 74–99)
GRAM STN SPEC: ABNORMAL
GRAM STN SPEC: ABNORMAL
HCT VFR BLD AUTO: 38.6 % (ref 36–46)
HGB BLD-MCNC: 11.7 G/DL (ref 12–16)
MCH RBC QN AUTO: 26.7 PG (ref 26–34)
MCHC RBC AUTO-ENTMCNC: 30.3 G/DL (ref 32–36)
MCV RBC AUTO: 88 FL (ref 80–100)
NRBC BLD-RTO: 0 /100 WBCS (ref 0–0)
PLATELET # BLD AUTO: 272 X10*3/UL (ref 150–450)
POTASSIUM SERPL-SCNC: 4.7 MMOL/L (ref 3.5–5.3)
RBC # BLD AUTO: 4.38 X10*6/UL (ref 4–5.2)
SODIUM SERPL-SCNC: 136 MMOL/L (ref 136–145)
WBC # BLD AUTO: 7.6 X10*3/UL (ref 4.4–11.3)

## 2025-05-31 PROCEDURE — 2500000004 HC RX 250 GENERAL PHARMACY W/ HCPCS (ALT 636 FOR OP/ED): Mod: JZ | Performed by: PHYSICIAN ASSISTANT

## 2025-05-31 PROCEDURE — 2500000001 HC RX 250 WO HCPCS SELF ADMINISTERED DRUGS (ALT 637 FOR MEDICARE OP): Performed by: INTERNAL MEDICINE

## 2025-05-31 PROCEDURE — 85027 COMPLETE CBC AUTOMATED: CPT

## 2025-05-31 PROCEDURE — 2500000001 HC RX 250 WO HCPCS SELF ADMINISTERED DRUGS (ALT 637 FOR MEDICARE OP)

## 2025-05-31 PROCEDURE — 80048 BASIC METABOLIC PNL TOTAL CA: CPT

## 2025-05-31 PROCEDURE — 87081 CULTURE SCREEN ONLY: CPT | Mod: PARLAB | Performed by: INTERNAL MEDICINE

## 2025-05-31 PROCEDURE — 2500000002 HC RX 250 W HCPCS SELF ADMINISTERED DRUGS (ALT 637 FOR MEDICARE OP, ALT 636 FOR OP/ED)

## 2025-05-31 PROCEDURE — 82947 ASSAY GLUCOSE BLOOD QUANT: CPT

## 2025-05-31 PROCEDURE — 2500000004 HC RX 250 GENERAL PHARMACY W/ HCPCS (ALT 636 FOR OP/ED): Performed by: INTERNAL MEDICINE

## 2025-05-31 PROCEDURE — 2500000004 HC RX 250 GENERAL PHARMACY W/ HCPCS (ALT 636 FOR OP/ED): Mod: JZ

## 2025-05-31 PROCEDURE — 36415 COLL VENOUS BLD VENIPUNCTURE: CPT

## 2025-05-31 PROCEDURE — 1200000002 HC GENERAL ROOM WITH TELEMETRY DAILY

## 2025-05-31 RX ORDER — CEFAZOLIN SODIUM 1 G/50ML
1 SOLUTION INTRAVENOUS EVERY 8 HOURS
Status: DISPENSED | OUTPATIENT
Start: 2025-05-31

## 2025-05-31 RX ORDER — LIDOCAINE HYDROCHLORIDE 10 MG/ML
5 INJECTION, SOLUTION INFILTRATION; PERINEURAL ONCE
Status: ACTIVE | OUTPATIENT
Start: 2025-05-31

## 2025-05-31 RX ORDER — BENZONATATE 100 MG/1
100 CAPSULE ORAL 3 TIMES DAILY
Status: DISPENSED | OUTPATIENT
Start: 2025-05-31

## 2025-05-31 RX ORDER — MORPHINE SULFATE 4 MG/ML
4 INJECTION, SOLUTION INTRAMUSCULAR; INTRAVENOUS EVERY 4 HOURS PRN
Status: ACTIVE | OUTPATIENT
Start: 2025-05-31

## 2025-05-31 RX ORDER — MORPHINE SULFATE 2 MG/ML
2 INJECTION, SOLUTION INTRAMUSCULAR; INTRAVENOUS EVERY 4 HOURS PRN
Status: DISPENSED | OUTPATIENT
Start: 2025-05-31

## 2025-05-31 RX ADMIN — CEFAZOLIN SODIUM 1 G: 1 INJECTION, SOLUTION INTRAVENOUS at 21:25

## 2025-05-31 RX ADMIN — INSULIN LISPRO 6 UNITS: 100 INJECTION, SOLUTION INTRAVENOUS; SUBCUTANEOUS at 08:35

## 2025-05-31 RX ADMIN — CETIRIZINE HYDROCHLORIDE 10 MG: 10 TABLET, FILM COATED ORAL at 08:37

## 2025-05-31 RX ADMIN — TRAMADOL HYDROCHLORIDE 50 MG: 50 TABLET, COATED ORAL at 15:46

## 2025-05-31 RX ADMIN — PANTOPRAZOLE SODIUM 40 MG: 40 TABLET, DELAYED RELEASE ORAL at 08:37

## 2025-05-31 RX ADMIN — METOPROLOL SUCCINATE 25 MG: 25 TABLET, EXTENDED RELEASE ORAL at 20:57

## 2025-05-31 RX ADMIN — DAPAGLIFLOZIN 5 MG: 5 TABLET, FILM COATED ORAL at 08:37

## 2025-05-31 RX ADMIN — ICOSAPENT ETHYL 2 G: 1 CAPSULE ORAL at 08:37

## 2025-05-31 RX ADMIN — MORPHINE SULFATE 2 MG: 2 INJECTION, SOLUTION INTRAMUSCULAR; INTRAVENOUS at 21:29

## 2025-05-31 RX ADMIN — PIPERACILLIN SODIUM AND TAZOBACTAM SODIUM 3.38 G: 3; .375 INJECTION, SOLUTION INTRAVENOUS at 08:37

## 2025-05-31 RX ADMIN — ICOSAPENT ETHYL 2 G: 1 CAPSULE ORAL at 16:44

## 2025-05-31 RX ADMIN — LISINOPRIL 10 MG: 10 TABLET ORAL at 08:37

## 2025-05-31 RX ADMIN — CEFAZOLIN SODIUM 1 G: 1 INJECTION, SOLUTION INTRAVENOUS at 14:24

## 2025-05-31 RX ADMIN — BENZONATATE 100 MG: 100 CAPSULE ORAL at 20:57

## 2025-05-31 RX ADMIN — TRAMADOL HYDROCHLORIDE 50 MG: 50 TABLET, COATED ORAL at 08:37

## 2025-05-31 RX ADMIN — BENZONATATE 100 MG: 100 CAPSULE ORAL at 15:44

## 2025-05-31 ASSESSMENT — PAIN SCALES - GENERAL
PAINLEVEL_OUTOF10: 6
PAINLEVEL_OUTOF10: 6
PAINLEVEL_OUTOF10: 4
PAINLEVEL_OUTOF10: 2
PAINLEVEL_OUTOF10: 4
PAINLEVEL_OUTOF10: 7

## 2025-05-31 ASSESSMENT — PAIN - FUNCTIONAL ASSESSMENT
PAIN_FUNCTIONAL_ASSESSMENT: 0-10

## 2025-05-31 ASSESSMENT — PAIN DESCRIPTION - DESCRIPTORS: DESCRIPTORS: THROBBING;SPASM;SHARP

## 2025-05-31 ASSESSMENT — COGNITIVE AND FUNCTIONAL STATUS - GENERAL: MOBILITY SCORE: 24

## 2025-05-31 ASSESSMENT — PAIN DESCRIPTION - ORIENTATION: ORIENTATION: RIGHT

## 2025-05-31 ASSESSMENT — PAIN DESCRIPTION - LOCATION: LOCATION: FOOT

## 2025-05-31 NOTE — CARE PLAN
The patient's goals for the shift include      The clinical goals for the shift include Maintain safety; Maintain stable vitals    Over the shift, the patient did not make progress toward the following goals. Barriers to progression include Pt has recent surgery to R big toe. Recommendations to address these barriers include Maintain safety; Maintain stable vitals.

## 2025-05-31 NOTE — PROGRESS NOTES
PODIATRY SERVICE CONSULT PROGRESS NOTE    SERVICE DATE: 5/31/2025   SERVICE TIME:  1:30PM    Subjective   INTERVAL HPI:   Pt was seen at bedside.  Pain well controlled.  Patient denies any constitutional symptoms.   No other pedal complaints.   Post-op dressing is clean, dry and intact.    Medications:  Scheduled Meds: Scheduled Medications[1]  Continuous Infusions: Continuous Medications[2]  PRN Meds: PRN Medications[3]         Objective   PHYSICAL EXAM:  Physical Exam Performed:  Vitals:    05/31/25 0742   BP: 124/66   Pulse: 76   Resp:    Temp: 35.3 °C (95.5 °F)   SpO2: 95%     Body mass index is 47.63 kg/m².    Patient is AOx3 and in no acute distress. Patient is alert and cooperative. Sitting comfortably in bed with dressing clean, dry and intact. Unaccompanied.      Right Lower Extremity Focused Examination:  Vascular: Palpable DP/PT pulses. Moderate edema noted. Hair growth absent. CFT<5 to hallux. Temperature is warm to warm from tibial tuberosity to distal digits with focal callor over 1st MTPJ and great toe.     Musculoskeletal: There is mild tenderness to the wound and periwound tissues, extending 4cm proximal from wound dorsal and medially, but no plantar tenderness. There is pain with passive or active ROM of the 1st MTPJ. EHL and FHL grossly intact. Gross active and passive ROM intact to age and activity level. Moves all extremities spontaneously. No pain with calf compression. Palpable nodule noted lateral to 5th metatarsal, corresponds with malunited shaft fracture.     Neurological: Intact light touch sensation. Pain stimuli diminished.      Dermatologic: Wound as noted below. There is erythema and callor extending distally from the wound into the great toe, and extending proximally to the dorsal midfoot. Nails 1-5 are within normal limits for thickness and length. Skin appears diffusely xerotic. Web spaces 1-4 are clean, dry and intact.           Surgical Wound: Right medial forefoot  Mixed wound  base of fibrotic and granular tissue including bone  Mild sanguinous drainage  No lesa-wound maceration.   Moderate lesa-wound erythema.   No palpable fluctuance. No malodor. Moderate increased warmth.     LABS:   Results for orders placed or performed during the hospital encounter of 05/28/25 (from the past 24 hours)   Type and screen   Result Value Ref Range    ABO TYPE O     Rh TYPE POS     ANTIBODY SCREEN NEG    POCT GLUCOSE   Result Value Ref Range    POCT Glucose 129 (H) 74 - 99 mg/dL   POCT GLUCOSE   Result Value Ref Range    POCT Glucose 210 (H) 74 - 99 mg/dL   CBC   Result Value Ref Range    WBC 7.6 4.4 - 11.3 x10*3/uL    nRBC 0.0 0.0 - 0.0 /100 WBCs    RBC 4.38 4.00 - 5.20 x10*6/uL    Hemoglobin 11.7 (L) 12.0 - 16.0 g/dL    Hematocrit 38.6 36.0 - 46.0 %    MCV 88 80 - 100 fL    MCH 26.7 26.0 - 34.0 pg    MCHC 30.3 (L) 32.0 - 36.0 g/dL    RDW 13.9 11.5 - 14.5 %    Platelets 272 150 - 450 x10*3/uL   Basic Metabolic Panel   Result Value Ref Range    Glucose 180 (H) 74 - 99 mg/dL    Sodium 136 136 - 145 mmol/L    Potassium 4.7 3.5 - 5.3 mmol/L    Chloride 105 98 - 107 mmol/L    Bicarbonate 24 21 - 32 mmol/L    Anion Gap 12 10 - 20 mmol/L    Urea Nitrogen 26 (H) 6 - 23 mg/dL    Creatinine 1.53 (H) 0.50 - 1.05 mg/dL    eGFR 42 (L) >60 mL/min/1.73m*2    Calcium 8.7 8.6 - 10.3 mg/dL   POCT GLUCOSE   Result Value Ref Range    POCT Glucose 278 (H) 74 - 99 mg/dL   POCT GLUCOSE   Result Value Ref Range    POCT Glucose 125 (H) 74 - 99 mg/dL      Lab Results   Component Value Date    HGBA1C 8.1 (H) 02/19/2025      Lab Results   Component Value Date    CRP 29.35 (H) 05/28/2025      Lab Results   Component Value Date    SEDRATE 70 (H) 05/28/2025        Results from last 7 days   Lab Units 05/31/25  0538   WBC AUTO x10*3/uL 7.6   RBC AUTO x10*6/uL 4.38   HEMOGLOBIN g/dL 11.7*   HEMATOCRIT % 38.6     Results from last 7 days   Lab Units 05/31/25  0539 05/29/25  0911 05/28/25  1943   SODIUM mmol/L 136   < > 136   POTASSIUM  mmol/L 4.7   < > 4.0   CHLORIDE mmol/L 105   < > 100   CO2 mmol/L 24   < > 23   BUN mg/dL 26*   < > 20   CREATININE mg/dL 1.53*   < > 1.45*   CALCIUM mg/dL 8.7   < > 9.5   BILIRUBIN TOTAL mg/dL  --   --  0.5   ALT U/L  --   --  12   AST U/L  --   --  11    < > = values in this interval not displayed.           IMAGING REVIEW:  Imaging  MR foot right w and wo IV contrast  Result Date: 5/29/2025  Hallux valgus and soft tissue ulcer/cellulitis with high likelihood of osteomyelitis involving the 1st metatarsal head and neck.   Findings of the midfoot suggestive of neuropathic arthropathy.   Subacute to chronic fracture deformities of the 4th and 5th metatarsal shafts.   Additional ancillary findings, as above.   MACRO: None   Signed by: Jose Saxena 5/29/2025 1:41 PM Dictation workstation:   [id number]    XR foot right 3+ views  Result Date: 5/28/2025  Soft tissue swelling of the dorsum of the foot and great toe with ulceration at the level of the 1st MTP joint.   No radiographic evidence of osteomyelitis.  MRI is more sensitive for early changes of osteomyelitis and may be considered if clinically indicated.   Degenerative and posttraumatic changes as above.     MACRO: None   Signed by: Alexa Cline 5/28/2025 11:19 PM Dictation workstation:   [id number]    XR toe right 2+ views  Result Date: 5/28/2025  Soft tissue swelling of the dorsum of the foot and great toe with ulceration at the level of the 1st MTP joint.   No radiographic evidence of osteomyelitis.  MRI is more sensitive for early changes of osteomyelitis and may be considered if clinically indicated.   Degenerative and posttraumatic changes as above.     MACRO: None   Signed by: Alexa Cline 5/28/2025 11:19 PM Dictation workstation:   [id number]    XR chest 2 views  Result Date: 5/28/2025  No acute cardiopulmonary process.     MACRO: None.   Signed by: Quinton Abdalla 5/28/2025 7:51 PM Dictation workstation:   [id number]      Cardiology, Vascular,  and Other Imaging  No other imaging results found for the past 7 days            Assessment/Plan   ASSESSMENT & PLAN:    #S/P Day 1 Right Foot Incision and Drainage with Bone Resection  # Non-pressure ulcer of right medial forefoot with necrosis of muscle  # Cellulitis of right foot  # Likely osteomyelitis of right foot 1st metatarsal head  # Septic arthritis of right 1st MTPJ  # Charcot neuroarthropathy of right foot  # Type 2 DM c/b peripheral polyneuropathy     - Patient was seen and evaluated; all findings were discussed and all questions were answered to patient's satisfaction.  - Charts, labs, vitals and imaging all reviewed.   - Labs: WBC 7.6, ESR 70, CRP 29.35,   - Imaging: Right foot MRI exhibited possible OM in the Head and Neck of the 1st Metatarsal  Right foot XR with no acute findings. No osseous erosion. No soft tissue gas. Chronic findings: Non-acute appearance of tarsometatarsal joint destruction and collapse c/w charcot neuroarthropathy, healed malunions of 4th and 5th metatarsal diaphyses. Severe metatarsus adductus and hallux valgus deformities. 2nd metatarsal head collapse c/w [place] infraction  - Wound culture: Surgical cx is final, positive for MSSA  - Blood cultures: NGTD Day 2      Plan:  - Evaluated bedside on nursing floor.   - Will return to OR on Monday, June 2nd. Discussed this in detail with patient as well as risks and benefits. NPO after midnight.  - Dressings: Nu-Gauze packing into the surgical wound, 4x4's, Kerlix and ACE.  - Nursing staff is able to change/reinforce dressing if & as necessary until next dressing change. Thank you.  - Abx: Per ID  - Pain regimen per primary  - Bowel regimen per primary  - Podiatry will continue to follow while in house.   - Discussed with attending Dr. Eden     Weightbearing: WBAT to heel in surgical shoe  Discharge: Pt to follow up 1 week after discharge with Dr. Eden.     Case to be discussed with attending, A&P above reflects a  tentative plan. Please await for the final signature from the attending physician on service.     This patient will be followed by the Podiatry service. Please Epic Chat the corresponding residents below with questions or concerns.     Olivier Perales DPM PGY-3  Podiatric Medicine & Surgery  Please Johnson[facility] message me with any questions or concerns.     SIGNATURE: Olivier Perales DPM PATIENT NAME: Patricia Gibbs   DATE: May 31, 2025 MRN: [id number]   TIME: 2:53 PM CONTACT: [facility] Message               [1] ceFAZolin, 1 g, intravenous, q8h  cetirizine, 10 mg, oral, q AM  dapagliflozin propanediol, 5 mg, oral, Daily  icosapent ethyL, 2 g, oral, BID  insulin lispro, 0-10 Units, subcutaneous, TID AC  lidocaine, 5 mL, infiltration, Once  lisinopril, 10 mg, oral, Daily  metoprolol succinate XL, 25 mg, oral, Nightly  pantoprazole, 40 mg, oral, Daily  [2]    [3] PRN medications: acetaminophen, albuterol, alteplase, dextrose, dextrose, glucagon, glucagon, polyethylene glycol, traMADol

## 2025-05-31 NOTE — CONSULTS
Consults  Referring physician Dr. Errol Ibanez  History of present illness    Is a 49-year-old levity obese female that came in with right foot wound.  She was seen by Dr. Wells in the office and there was a concern for osteomyelitis.  Wound cultures had grown out MSSA and she underwent surgery yesterday.  We are called for further antibiotic management.    Past medical history significant for diabetes mellitus, CAD/STEMI status post stent, hypertension, hyperlipidemia, lung nodule, obesity    Past surgical history , coronary stent placement, tonsillectomy, toenail excision    Social history quit smoking 21 years ago no drinking smoking drug use  Family history no sick contacts  Allergies no known antibiotic allergies  10 point review of pain with the patient denying any complaints outside of those listed in HPI above    Physical exam  HEENT PERRLA  Chest fair air entry bilaterally  CVS S1-S2 regular  Abdomen soft nontender positive bowel sounds  Extremities her right foot is wrapped from the OR so I will not remove.  I will wait till podiatry takes off the first dressing    Labs and radiology reviewed    Impression:  49-year-old female with a right foot abscess status post I&D along with a Charcot foot.  At this time we will recommend the following    Suggestion:  1.  I would give her a longer treatment course with antibiotics 4 to 6 weeks.  I would de-escalate the antibiotics to Ancef due to the sensitivities of the Staph aureus.  2.  Will put in for a PICC line for Monday  3.  Patient is going for closure  Thank for this consult follow with you as needed    I have reviewed and interpreted all lab tests imaging with IVs and documentations from other healthcare providers  I am monitoring antibiotics for side effects, toxicity

## 2025-05-31 NOTE — CARE PLAN
The patient's goals for the shift include      The clinical goals for the shift include Maintain safety; Maintain stable vitals

## 2025-05-31 NOTE — PROGRESS NOTES
Patricia Gibbs is a 49 y.o. female on day 3 of admission presenting with Cellulitis.      Subjective   No events overnight. Patient getting surgery today.        Objective     Last Recorded Vitals  /66   Pulse 76   Temp 35.3 °C (95.5 °F)   Resp 18   Wt 125 kg (275 lb 9.2 oz)   SpO2 95%   Intake/Output last 3 Shifts:  No intake or output data in the 24 hours ending 05/31/25 1523      Admission Weight  Weight: 125 kg (275 lb) (05/28/25 1923)    Daily Weight  05/30/25 : 125 kg (275 lb 9.2 oz)    Image Results  MR foot right w and wo IV contrast  Narrative: Interpreted By:  Jose Saxena,   STUDY:  MRI of the right foot without and with IV contrast;      INDICATION:  Signs/Symptoms:concern for osteomyelitis          COMPARISON:  05/28/2025      ACCESSION NUMBER(S):  [id number]      ORDERING CLINICIAN:  JESS BERMUDEZ      TECHNIQUE:  Multiplanar multisequence MRI of the  right foot was performed  without and with IV contrast.      FINDINGS:  Soft tissues: There is a soft tissue ulcer adjacent to the 1st  metatarsophalangeal joint medially. No discrete rim enhancing fluid  collection to suggest abscess. Moderate volume 1st intermetatarsal  bursal fluid.      Bones:  Severe degenerative changes of the midfoot with fragmentation  and disorganization, likely reflecting sequela of neuropathic  arthropathy. There is associated bone marrow edema like signal. There  are subacute to remote fracture deformities of the 4th and 5th  metatarsal shafts.      Hallux valgus. There is marrow edema of the 1st metatarsal head and  neck without T1 signal loss. There is associated contrast  enhancement. Small 1st metatarsophalangeal joint effusion.      Severe 2nd metatarsophalangeal degenerative change.      Muscles:  Edema and atrophy of the plantar foot musculature,  compatible with diabetic myopathy and/or myositis.      Miscellaneous:  Visualized tendons and Lisfranc ligament are grossly  intact.      Impression:  Hallux valgus and soft tissue ulcer/cellulitis with high likelihood  of osteomyelitis involving the 1st metatarsal head and neck.      Findings of the midfoot suggestive of neuropathic arthropathy.      Subacute to chronic fracture deformities of the 4th and 5th  metatarsal shafts.      Additional ancillary findings, as above.      MACRO:  None      Signed by: Jose Eziomaye 5/29/2025 1:41 PM  Dictation workstation:   [id number]      Physical Exam  Constitutional:       Appearance: Normal appearance. She is obese.   HENT:      Head: Normocephalic and atraumatic.      Nose: Nose normal.      Mouth/Throat:      Mouth: Mucous membranes are moist.      Pharynx: Oropharynx is clear.   Eyes:      Extraocular Movements: Extraocular movements intact.      Conjunctiva/sclera: Conjunctivae normal.      Pupils: Pupils are equal, round, and reactive to light.   Cardiovascular:      Rate and Rhythm: Normal rate and regular rhythm.      Pulses: Normal pulses.      Heart sounds: Normal heart sounds.   Pulmonary:      Effort: Pulmonary effort is normal.      Breath sounds: Normal breath sounds.   Abdominal:      General: Bowel sounds are normal.      Palpations: Abdomen is soft.   Musculoskeletal:         General: Normal range of motion.      Cervical back: Normal range of motion and neck supple.   Skin:     General: Skin is warm and dry.      Capillary Refill: Capillary refill takes 2 to 3 seconds.      Comments: Right 1st digit with significant erythema and edema with serosanguineoious drainage noted.  Fat layer exposed, no tunneling. Erythema extends to 4th digit and extends up partial forefoot. MSP's intact.   Neurological:      General: No focal deficit present.      Mental Status: She is alert and oriented to person, place, and time.   Psychiatric:         Mood and Affect: Mood normal.         Behavior: Behavior normal.         Thought Content: Thought content normal.         Judgment: Judgment normal.      Relevant  Results               This patient currently has cardiac telemetry ordered; if you would like to modify or discontinue the telemetry order, click here to go to the orders activity to modify/discontinue the order.              Assessment & Plan  Cellulitis    Diabetes (Multi)    SAMANTHA (acute kidney injury)    Elevated C-reactive protein (CRP)    Leukocytosis    Acute osteomyelitis of right foot (Multi)    49 year old female who presented to ED today with infected right foot. Podiatry consulted. Concern for osteomyelitis. Continue antibiotics. Wound culture ordered. Patient to be admitted to hospital for further medical management.     Cellulitis of right great toe  Diabetic ulcer  SAMANTHA  Elevated inflammatory markers  Leukocytosis  Severe Charcot deformity of right foot  Admit to inpatient/telemetry to Dr. Cecil Ibanez  Podiatry consult and appreciate recs  See imaging results  Wound culture obtained in podiatry office  Follow blood cultures  Continue Zosyn and vancomycin  SSI with hypoglycemic protocol  Repeat labs in AM     Chronic conditions  #CAD, HTN, HLD, lung nodule  Continue home meds as appropriate when nursing completes home med rec.         Full code     DVT prophylaxis  Lovenox SQ  SCD's, as tolerated     5/30: MRI showed high likelihood of OM of right great toe. Patient underwent I&D with bone resection of right great toe. Continue IV antibiotics for now. Will discuss antibiotic regimen for discharge with podiatry.   Patient fully evaluated on May 31.  Continue present IV antibiotics for methicillin sensitive Staph aureus.  Secondary closure planned for Monday.  Patient with significant cough and Tessalon Perles ordered.  Recheck labs in AM.         Shamar Shetty MD

## 2025-06-01 VITALS
RESPIRATION RATE: 20 BRPM | OXYGEN SATURATION: 96 % | HEART RATE: 102 BPM | DIASTOLIC BLOOD PRESSURE: 76 MMHG | BODY MASS INDEX: 47.05 KG/M2 | HEIGHT: 64 IN | WEIGHT: 275.57 LBS | SYSTOLIC BLOOD PRESSURE: 138 MMHG | TEMPERATURE: 97.7 F

## 2025-06-01 LAB
ALBUMIN SERPL BCP-MCNC: 3.6 G/DL (ref 3.4–5)
ALP SERPL-CCNC: 63 U/L (ref 33–110)
ALT SERPL W P-5'-P-CCNC: 12 U/L (ref 7–45)
ANION GAP SERPL CALC-SCNC: 16 MMOL/L (ref 10–20)
AST SERPL W P-5'-P-CCNC: 14 U/L (ref 9–39)
BACTERIA BLD CULT: NORMAL
BACTERIA BLD CULT: NORMAL
BACTERIA SPEC CULT: ABNORMAL
BACTERIA SPEC CULT: ABNORMAL
BILIRUB SERPL-MCNC: 0.3 MG/DL (ref 0–1.2)
BUN SERPL-MCNC: 22 MG/DL (ref 6–23)
CALCIUM SERPL-MCNC: 8.7 MG/DL (ref 8.6–10.3)
CHLORIDE SERPL-SCNC: 104 MMOL/L (ref 98–107)
CO2 SERPL-SCNC: 24 MMOL/L (ref 21–32)
CREAT SERPL-MCNC: 1.22 MG/DL (ref 0.5–1.05)
EGFRCR SERPLBLD CKD-EPI 2021: 55 ML/MIN/1.73M*2
ERYTHROCYTE [DISTWIDTH] IN BLOOD BY AUTOMATED COUNT: 14 % (ref 11.5–14.5)
GLUCOSE BLD MANUAL STRIP-MCNC: 159 MG/DL (ref 74–99)
GLUCOSE BLD MANUAL STRIP-MCNC: 167 MG/DL (ref 74–99)
GLUCOSE BLD MANUAL STRIP-MCNC: 178 MG/DL (ref 74–99)
GLUCOSE BLD MANUAL STRIP-MCNC: 202 MG/DL (ref 74–99)
GLUCOSE SERPL-MCNC: 140 MG/DL (ref 74–99)
GRAM STN SPEC: ABNORMAL
HCT VFR BLD AUTO: 39.7 % (ref 36–46)
HGB BLD-MCNC: 12.3 G/DL (ref 12–16)
MCH RBC QN AUTO: 26.9 PG (ref 26–34)
MCHC RBC AUTO-ENTMCNC: 31 G/DL (ref 32–36)
MCV RBC AUTO: 87 FL (ref 80–100)
NRBC BLD-RTO: 0 /100 WBCS (ref 0–0)
PLATELET # BLD AUTO: 302 X10*3/UL (ref 150–450)
POTASSIUM SERPL-SCNC: 4.6 MMOL/L (ref 3.5–5.3)
PROT SERPL-MCNC: 7.3 G/DL (ref 6.4–8.2)
RBC # BLD AUTO: 4.57 X10*6/UL (ref 4–5.2)
SODIUM SERPL-SCNC: 139 MMOL/L (ref 136–145)
WBC # BLD AUTO: 6.8 X10*3/UL (ref 4.4–11.3)

## 2025-06-01 PROCEDURE — 2500000001 HC RX 250 WO HCPCS SELF ADMINISTERED DRUGS (ALT 637 FOR MEDICARE OP): Performed by: INTERNAL MEDICINE

## 2025-06-01 PROCEDURE — 2500000001 HC RX 250 WO HCPCS SELF ADMINISTERED DRUGS (ALT 637 FOR MEDICARE OP)

## 2025-06-01 PROCEDURE — 82947 ASSAY GLUCOSE BLOOD QUANT: CPT

## 2025-06-01 PROCEDURE — 2500000004 HC RX 250 GENERAL PHARMACY W/ HCPCS (ALT 636 FOR OP/ED): Performed by: INTERNAL MEDICINE

## 2025-06-01 PROCEDURE — 1210000001 HC SEMI-PRIVATE ROOM DAILY

## 2025-06-01 PROCEDURE — 2500000001 HC RX 250 WO HCPCS SELF ADMINISTERED DRUGS (ALT 637 FOR MEDICARE OP): Performed by: PHYSICIAN ASSISTANT

## 2025-06-01 PROCEDURE — 85027 COMPLETE CBC AUTOMATED: CPT

## 2025-06-01 PROCEDURE — 36415 COLL VENOUS BLD VENIPUNCTURE: CPT

## 2025-06-01 PROCEDURE — 2500000002 HC RX 250 W HCPCS SELF ADMINISTERED DRUGS (ALT 637 FOR MEDICARE OP, ALT 636 FOR OP/ED)

## 2025-06-01 PROCEDURE — 84075 ASSAY ALKALINE PHOSPHATASE: CPT | Performed by: INTERNAL MEDICINE

## 2025-06-01 RX ORDER — OXYCODONE HYDROCHLORIDE 5 MG/1
5 TABLET ORAL EVERY 4 HOURS PRN
Refills: 0 | Status: DISPENSED | OUTPATIENT
Start: 2025-06-01

## 2025-06-01 RX ADMIN — LISINOPRIL 10 MG: 10 TABLET ORAL at 08:11

## 2025-06-01 RX ADMIN — ICOSAPENT ETHYL 2 G: 1 CAPSULE ORAL at 17:18

## 2025-06-01 RX ADMIN — ICOSAPENT ETHYL 2 G: 1 CAPSULE ORAL at 08:11

## 2025-06-01 RX ADMIN — CEFAZOLIN SODIUM 1 G: 1 INJECTION, SOLUTION INTRAVENOUS at 14:28

## 2025-06-01 RX ADMIN — OXYCODONE HYDROCHLORIDE 5 MG: 5 TABLET ORAL at 14:28

## 2025-06-01 RX ADMIN — INSULIN LISPRO 2 UNITS: 100 INJECTION, SOLUTION INTRAVENOUS; SUBCUTANEOUS at 17:18

## 2025-06-01 RX ADMIN — CEFAZOLIN SODIUM 1 G: 1 INJECTION, SOLUTION INTRAVENOUS at 06:44

## 2025-06-01 RX ADMIN — METOPROLOL SUCCINATE 25 MG: 25 TABLET, EXTENDED RELEASE ORAL at 21:51

## 2025-06-01 RX ADMIN — PANTOPRAZOLE SODIUM 40 MG: 40 TABLET, DELAYED RELEASE ORAL at 08:11

## 2025-06-01 RX ADMIN — OXYCODONE HYDROCHLORIDE 5 MG: 5 TABLET ORAL at 10:01

## 2025-06-01 RX ADMIN — GUAIFENESIN AND DEXTROMETHORPHAN HYDROBROMIDE 1 TABLET: 600; 30 TABLET, EXTENDED RELEASE ORAL at 08:11

## 2025-06-01 RX ADMIN — CEFAZOLIN SODIUM 1 G: 1 INJECTION, SOLUTION INTRAVENOUS at 22:10

## 2025-06-01 RX ADMIN — INSULIN LISPRO 2 UNITS: 100 INJECTION, SOLUTION INTRAVENOUS; SUBCUTANEOUS at 12:12

## 2025-06-01 RX ADMIN — OXYCODONE HYDROCHLORIDE 5 MG: 5 TABLET ORAL at 22:47

## 2025-06-01 RX ADMIN — OXYCODONE HYDROCHLORIDE 5 MG: 5 TABLET ORAL at 18:37

## 2025-06-01 RX ADMIN — BENZONATATE 100 MG: 100 CAPSULE ORAL at 14:28

## 2025-06-01 RX ADMIN — BENZONATATE 100 MG: 100 CAPSULE ORAL at 08:11

## 2025-06-01 RX ADMIN — GUAIFENESIN AND DEXTROMETHORPHAN HYDROBROMIDE 1 TABLET: 600; 30 TABLET, EXTENDED RELEASE ORAL at 21:51

## 2025-06-01 RX ADMIN — INSULIN LISPRO 2 UNITS: 100 INJECTION, SOLUTION INTRAVENOUS; SUBCUTANEOUS at 07:33

## 2025-06-01 RX ADMIN — CETIRIZINE HYDROCHLORIDE 10 MG: 10 TABLET, FILM COATED ORAL at 08:11

## 2025-06-01 RX ADMIN — BENZONATATE 100 MG: 100 CAPSULE ORAL at 21:51

## 2025-06-01 RX ADMIN — DAPAGLIFLOZIN 5 MG: 5 TABLET, FILM COATED ORAL at 08:11

## 2025-06-01 ASSESSMENT — PAIN - FUNCTIONAL ASSESSMENT
PAIN_FUNCTIONAL_ASSESSMENT: 0-10

## 2025-06-01 ASSESSMENT — COGNITIVE AND FUNCTIONAL STATUS - GENERAL
CLIMB 3 TO 5 STEPS WITH RAILING: A LITTLE
CLIMB 3 TO 5 STEPS WITH RAILING: A LITTLE
DAILY ACTIVITIY SCORE: 24
MOBILITY SCORE: 23
DAILY ACTIVITIY SCORE: 24
MOBILITY SCORE: 23

## 2025-06-01 ASSESSMENT — PAIN DESCRIPTION - ORIENTATION
ORIENTATION: RIGHT

## 2025-06-01 ASSESSMENT — PAIN SCALES - GENERAL
PAINLEVEL_OUTOF10: 3
PAINLEVEL_OUTOF10: 3
PAINLEVEL_OUTOF10: 6
PAINLEVEL_OUTOF10: 4
PAINLEVEL_OUTOF10: 3
PAINLEVEL_OUTOF10: 6
PAINLEVEL_OUTOF10: 7
PAINLEVEL_OUTOF10: 6
PAINLEVEL_OUTOF10: 6

## 2025-06-01 ASSESSMENT — PAIN DESCRIPTION - LOCATION
LOCATION: FOOT

## 2025-06-01 ASSESSMENT — PAIN DESCRIPTION - DESCRIPTORS: DESCRIPTORS: ACHING;SHARP

## 2025-06-01 NOTE — PROGRESS NOTES
Patricia Gibbs is a 49 y.o. female on day 4 of admission presenting with Cellulitis.      Subjective   No events overnight. Patient seen and examined at bedside. She has no complaints. She is hoping to shower after surgery tomorrow.        Objective     Last Recorded Vitals  /73   Pulse 86   Temp 35.5 °C (95.9 °F)   Resp 18   Wt 125 kg (275 lb 9.2 oz)   SpO2 98%   Intake/Output last 3 Shifts:    Intake/Output Summary (Last 24 hours) at 6/1/2025 1459  Last data filed at 6/1/2025 0815  Gross per 24 hour   Intake 220 ml   Output --   Net 220 ml       Admission Weight  Weight: 125 kg (275 lb) (05/28/25 1923)    Daily Weight  05/30/25 : 125 kg (275 lb 9.2 oz)    Image Results  MR foot right w and wo IV contrast  Narrative: Interpreted By:  Jose Saxena,   STUDY:  MRI of the right foot without and with IV contrast;      INDICATION:  Signs/Symptoms:concern for osteomyelitis          COMPARISON:  05/28/2025      ACCESSION NUMBER(S):  [id number]      ORDERING CLINICIAN:  JESS BERMUDEZ      TECHNIQUE:  Multiplanar multisequence MRI of the  right foot was performed  without and with IV contrast.      FINDINGS:  Soft tissues: There is a soft tissue ulcer adjacent to the 1st  metatarsophalangeal joint medially. No discrete rim enhancing fluid  collection to suggest abscess. Moderate volume 1st intermetatarsal  bursal fluid.      Bones:  Severe degenerative changes of the midfoot with fragmentation  and disorganization, likely reflecting sequela of neuropathic  arthropathy. There is associated bone marrow edema like signal. There  are subacute to remote fracture deformities of the 4th and 5th  metatarsal shafts.      Hallux valgus. There is marrow edema of the 1st metatarsal head and  neck without T1 signal loss. There is associated contrast  enhancement. Small 1st metatarsophalangeal joint effusion.      Severe 2nd metatarsophalangeal degenerative change.      Muscles:  Edema and atrophy of the plantar  foot musculature,  compatible with diabetic myopathy and/or myositis.      Miscellaneous:  Visualized tendons and Lisfranc ligament are grossly  intact.      Impression: Hallux valgus and soft tissue ulcer/cellulitis with high likelihood  of osteomyelitis involving the 1st metatarsal head and neck.      Findings of the midfoot suggestive of neuropathic arthropathy.      Subacute to chronic fracture deformities of the 4th and 5th  metatarsal shafts.      Additional ancillary findings, as above.      MACRO:  None      Signed by: Jose Saxena 5/29/2025 1:41 PM  Dictation workstation:   [id number]      Physical Exam  Constitutional:       Appearance: Normal appearance. She is obese.   HENT:      Head: Normocephalic and atraumatic.      Nose: Nose normal.      Mouth/Throat:      Mouth: Mucous membranes are moist.      Pharynx: Oropharynx is clear.   Eyes:      Extraocular Movements: Extraocular movements intact.      Conjunctiva/sclera: Conjunctivae normal.      Pupils: Pupils are equal, round, and reactive to light.   Cardiovascular:      Rate and Rhythm: Normal rate and regular rhythm.      Pulses: Normal pulses.      Heart sounds: Normal heart sounds.   Pulmonary:      Effort: Pulmonary effort is normal.      Breath sounds: Normal breath sounds.   Abdominal:      General: Bowel sounds are normal.      Palpations: Abdomen is soft.   Musculoskeletal:         General: Normal range of motion.      Cervical back: Normal range of motion and neck supple.   Skin:     General: Skin is warm and dry.      Capillary Refill: Capillary refill takes 2 to 3 seconds.      Comments: Right 1st digit with significant erythema and edema with serosanguineoious drainage noted.  Fat layer exposed, no tunneling. Erythema extends to 4th digit and extends up partial forefoot. MSP's intact.   Neurological:      General: No focal deficit present.      Mental Status: She is alert and oriented to person, place, and time.   Psychiatric:          Mood and Affect: Mood normal.         Behavior: Behavior normal.         Thought Content: Thought content normal.         Judgment: Judgment normal.      Relevant Results                              Assessment & Plan  Cellulitis    Diabetes (Multi)    SAMANTHA (acute kidney injury)    Elevated C-reactive protein (CRP)    Leukocytosis    Acute osteomyelitis of right foot (Multi)    49 year old female who presented to ED today with infected right foot. Podiatry consulted. Concern for osteomyelitis. Continue antibiotics. Wound culture ordered. Patient to be admitted to hospital for further medical management.     Cellulitis of right great toe  Diabetic ulcer  SAMANTHA  Elevated inflammatory markers  Leukocytosis  Severe Charcot deformity of right foot  Admit to inpatient/telemetry to Dr. Cecil Ibanez  Podiatry consult and appreciate recs  See imaging results  Wound culture obtained in podiatry office  Follow blood cultures  Continue Zosyn and vancomycin  SSI with hypoglycemic protocol  Repeat labs in AM     Chronic conditions  #CAD, HTN, HLD, lung nodule  Continue home meds as appropriate when nursing completes home med rec.         Full code     DVT prophylaxis  Lovenox SQ  SCD's, as tolerated     5/30: MRI showed high likelihood of OM of right great toe. Patient underwent I&D with bone resection of right great toe. Continue IV antibiotics for now. Will discuss antibiotic regimen for discharge with podiatry.     6/1: Patient will need 6 weeks of IV antibiotics per ID. Patient is hoping to do this from home and to work from home during this time. She will likely get a PICC tomorrow. She will go back to the OR tomorrow for delayed closure as well.           Cecil Ibanez, DO

## 2025-06-01 NOTE — PROGRESS NOTES
PODIATRY SERVICE CONSULT PROGRESS NOTE    SERVICE DATE: 6/1/2025   SERVICE TIME:  11:50PM    Subjective   INTERVAL HPI:   Pt was seen at bedside.  Pain well controlled.  Patient denies any constitutional symptoms.   No other pedal complaints.   Dressing is clean, dry and intact.    Medications:  Scheduled Meds: Scheduled Medications[1]  Continuous Infusions: Continuous Medications[2]  PRN Meds: PRN Medications[3]         Objective   PHYSICAL EXAM:  Physical Exam Performed:  Vitals:    06/01/25 1140   BP: 165/73   Pulse: 86   Resp:    Temp: 35.5 °C (95.9 °F)   SpO2: 98%     Body mass index is 47.63 kg/m².    Patient is AOx3 and in no acute distress. Patient is alert and cooperative. Sitting comfortably in bed with dressing clean, dry and intact. Accompanied by .      Right Lower Extremity Focused Examination:  Vascular: Palpable DP/PT pulses. Moderate edema noted. Hair growth absent. CFT<5 to hallux. Temperature is warm to warm from tibial tuberosity to distal digits with focal callor over 1st MTPJ and great toe.     Musculoskeletal: There is mild tenderness to the wound and periwound tissues, extending 4cm proximal from wound dorsal and medially, but no plantar tenderness. There is pain with passive or active ROM of the 1st MTPJ. EHL and FHL grossly intact. Gross active and passive ROM intact to age and activity level. Moves all extremities spontaneously. No pain with calf compression. Palpable nodule noted lateral to 5th metatarsal, corresponds with malunited shaft fracture.     Neurological: Intact light touch sensation. Pain stimuli diminished.      Dermatologic: Wound as noted below. There is erythema and callor extending distally from the wound into the great toe, and extending proximally to the dorsal midfoot. Nails 1-5 are within normal limits for thickness and length. Skin appears diffusely xerotic. Web spaces 1-4 are clean, dry and intact.           Surgical Wound: Right medial forefoot  Mixed wound  base of fibrotic and granular tissue including bone  Mild sanguinous drainage  No lesa-wound maceration.   Moderate lesa-wound erythema.   No palpable fluctuance. No malodor. Moderate increased warmth.     LABS:   Results for orders placed or performed during the hospital encounter of 05/28/25 (from the past 24 hours)   POCT GLUCOSE   Result Value Ref Range    POCT Glucose 140 (H) 74 - 99 mg/dL   POCT GLUCOSE   Result Value Ref Range    POCT Glucose 183 (H) 74 - 99 mg/dL   CBC   Result Value Ref Range    WBC 6.8 4.4 - 11.3 x10*3/uL    nRBC 0.0 0.0 - 0.0 /100 WBCs    RBC 4.57 4.00 - 5.20 x10*6/uL    Hemoglobin 12.3 12.0 - 16.0 g/dL    Hematocrit 39.7 36.0 - 46.0 %    MCV 87 80 - 100 fL    MCH 26.9 26.0 - 34.0 pg    MCHC 31.0 (L) 32.0 - 36.0 g/dL    RDW 14.0 11.5 - 14.5 %    Platelets 302 150 - 450 x10*3/uL   Comprehensive Metabolic Panel   Result Value Ref Range    Glucose 140 (H) 74 - 99 mg/dL    Sodium 139 136 - 145 mmol/L    Potassium 4.6 3.5 - 5.3 mmol/L    Chloride 104 98 - 107 mmol/L    Bicarbonate 24 21 - 32 mmol/L    Anion Gap 16 10 - 20 mmol/L    Urea Nitrogen 22 6 - 23 mg/dL    Creatinine 1.22 (H) 0.50 - 1.05 mg/dL    eGFR 55 (L) >60 mL/min/1.73m*2    Calcium 8.7 8.6 - 10.3 mg/dL    Albumin 3.6 3.4 - 5.0 g/dL    Alkaline Phosphatase 63 33 - 110 U/L    Total Protein 7.3 6.4 - 8.2 g/dL    AST 14 9 - 39 U/L    Bilirubin, Total 0.3 0.0 - 1.2 mg/dL    ALT 12 7 - 45 U/L   POCT GLUCOSE   Result Value Ref Range    POCT Glucose 159 (H) 74 - 99 mg/dL   POCT GLUCOSE   Result Value Ref Range    POCT Glucose 167 (H) 74 - 99 mg/dL      Lab Results   Component Value Date    HGBA1C 8.1 (H) 02/19/2025      Lab Results   Component Value Date    CRP 29.35 (H) 05/28/2025      Lab Results   Component Value Date    SEDRATE 70 (H) 05/28/2025        Results from last 7 days   Lab Units 06/01/25  0525   WBC AUTO x10*3/uL 6.8   RBC AUTO x10*6/uL 4.57   HEMOGLOBIN g/dL 12.3   HEMATOCRIT % 39.7     Results from last 7 days   Lab Units  06/01/25  0525   SODIUM mmol/L 139   POTASSIUM mmol/L 4.6   CHLORIDE mmol/L 104   CO2 mmol/L 24   BUN mg/dL 22   CREATININE mg/dL 1.22*   CALCIUM mg/dL 8.7   BILIRUBIN TOTAL mg/dL 0.3   ALT U/L 12   AST U/L 14           IMAGING REVIEW:  Imaging  MR foot right w and wo IV contrast  Result Date: 5/29/2025  Hallux valgus and soft tissue ulcer/cellulitis with high likelihood of osteomyelitis involving the 1st metatarsal head and neck.   Findings of the midfoot suggestive of neuropathic arthropathy.   Subacute to chronic fracture deformities of the 4th and 5th metatarsal shafts.   Additional ancillary findings, as above.   MACRO: None   Signed by: Jose Saxena 5/29/2025 1:41 PM Dictation workstation:   [id number]    XR foot right 3+ views  Result Date: 5/28/2025  Soft tissue swelling of the dorsum of the foot and great toe with ulceration at the level of the 1st MTP joint.   No radiographic evidence of osteomyelitis.  MRI is more sensitive for early changes of osteomyelitis and may be considered if clinically indicated.   Degenerative and posttraumatic changes as above.     MACRO: None   Signed by: Alexa Cline 5/28/2025 11:19 PM Dictation workstation:   [id number]    XR toe right 2+ views  Result Date: 5/28/2025  Soft tissue swelling of the dorsum of the foot and great toe with ulceration at the level of the 1st MTP joint.   No radiographic evidence of osteomyelitis.  MRI is more sensitive for early changes of osteomyelitis and may be considered if clinically indicated.   Degenerative and posttraumatic changes as above.     MACRO: None   Signed by: Alexa Cline 5/28/2025 11:19 PM Dictation workstation:   [id number]    XR chest 2 views  Result Date: 5/28/2025  No acute cardiopulmonary process.     MACRO: None.   Signed by: Quinton Abdalla 5/28/2025 7:51 PM Dictation workstation:   [id number]      Cardiology, Vascular, and Other Imaging  No other imaging results found for the past 7 days             Assessment/Plan   ASSESSMENT & PLAN:    #S/P Day 2 Right Foot Incision and Drainage with Bone Resection  # Non-pressure ulcer of right medial forefoot with necrosis of muscle  # Cellulitis of right foot  # Likely osteomyelitis of right foot 1st metatarsal head  # Septic arthritis of right 1st MTPJ  # Charcot neuroarthropathy of right foot  # Type 2 DM c/b peripheral polyneuropathy     - Patient was seen and evaluated; all findings were discussed and all questions were answered to patient's satisfaction.  - Charts, labs, vitals and imaging all reviewed.   - Labs: WBC 7.6, ESR 70, CRP 29.35,   - Imaging: Right foot MRI exhibited possible OM in the Head and Neck of the 1st Metatarsal  Right foot XR with no acute findings. No osseous erosion. No soft tissue gas. Chronic findings: Non-acute appearance of tarsometatarsal joint destruction and collapse c/w charcot neuroarthropathy, healed malunions of 4th and 5th metatarsal diaphyses. Severe metatarsus adductus and hallux valgus deformities. 2nd metatarsal head collapse c/w [place] infraction  - Wound culture: Surgical cx is final, positive for MSSA  - Blood cultures: NGTD Day 3      Plan:  - Evaluated bedside on nursing floor.   - Will return to OR on Monday, June 2nd. Discussed this in detail with patient as well as risks and benefits. NPO after midnight.  - Dressings: Nu-Gauze packing into the surgical wound, 4x4's, Kerlix and ACE.  - Nursing staff is able to change/reinforce dressing if & as necessary until next dressing change. Thank you.  - Abx: Per ID  - Pain regimen per primary  - Bowel regimen per primary  - Podiatry will continue to follow while in house.   - Discussed with attending Dr. Eden     Weightbearing: WBAT to heel in surgical shoe  Discharge: Pt to follow up 1 week after discharge with Dr. Eden.     Case to be discussed with attending, A&P above reflects a tentative plan. Please await for the final signature from the attending physician  on service.     This patient will be followed by the Podiatry service. Please Epic Chat the corresponding residents below with questions or concerns.     Olivier Perales DPM PGY-3  Podiatric Medicine & Surgery  Please Johnson[facility] message me with any questions or concerns.     SIGNATURE: Olivier Perales DPM PATIENT NAME: Patricia Gibbs   DATE: June 1, 2025 MRN: [id number]   TIME: 12:08 PM CONTACT: [facility] Message               [1] benzonatate, 100 mg, oral, TID  ceFAZolin, 1 g, intravenous, q8h  cetirizine, 10 mg, oral, q AM  dapagliflozin propanediol, 5 mg, oral, Daily  icosapent ethyL, 2 g, oral, BID  insulin lispro, 0-10 Units, subcutaneous, TID AC  lidocaine, 5 mL, infiltration, Once  lisinopril, 10 mg, oral, Daily  metoprolol succinate XL, 25 mg, oral, Nightly  pantoprazole, 40 mg, oral, Daily     [2]    [3] PRN medications: acetaminophen, albuterol, alteplase, dextromethorphan-guaifenesin, dextrose, dextrose, glucagon, glucagon, morphine **OR** morphine, oxyCODONE, polyethylene glycol

## 2025-06-01 NOTE — CARE PLAN
Problem: Pain - Adult  Goal: Verbalizes/displays adequate comfort level or baseline comfort level  Outcome: Progressing     Problem: Safety - Adult  Goal: Free from fall injury  Outcome: Progressing     Problem: Discharge Planning  Goal: Discharge to home or other facility with appropriate resources  Outcome: Progressing     Problem: Chronic Conditions and Co-morbidities  Goal: Patient's chronic conditions and co-morbidity symptoms are monitored and maintained or improved  Outcome: Progressing     Problem: Nutrition  Goal: Nutrient intake appropriate for maintaining nutritional needs  Outcome: Progressing     Problem: Diabetes  Goal: Achieve decreasing blood glucose levels by end of shift  Outcome: Progressing  Goal: Increase stability of blood glucose readings by end of shift  Outcome: Progressing  Goal: Decrease in ketones present in urine by end of shift  Outcome: Progressing  Goal: Maintain electrolyte levels within acceptable range throughout shift  Outcome: Progressing  Goal: Maintain glucose levels >70mg/dl to <250mg/dl throughout shift  Outcome: Progressing  Goal: No changes in neurological exam by end of shift  Outcome: Progressing  Goal: Learn about and adhere to nutrition recommendations by end of shift  Outcome: Progressing  Goal: Vital signs within normal range for age by end of shift  Outcome: Progressing  Goal: Increase self care and/or family involovement by end of shift  Outcome: Progressing  Goal: Receive DSME education by end of shift  Outcome: Progressing     Problem: Pain  Goal: Takes deep breaths with improved pain control throughout the shift  Outcome: Progressing  Goal: Turns in bed with improved pain control throughout the shift  Outcome: Progressing  Goal: Walks with improved pain control throughout the shift  Outcome: Progressing  Goal: Performs ADL's with improved pain control throughout shift  Outcome: Progressing  Goal: Participates in PT with improved pain control throughout the  shift  Outcome: Progressing  Goal: Free from opioid side effects throughout the shift  Outcome: Progressing  Goal: Free from acute confusion related to pain meds throughout the shift  Outcome: Progressing   The patient's goals for the shift include      The clinical goals for the shift include comfort and safety    Over the shift, the patient did make progress toward the following goals. Barriers to progression include pain. Recommendations to address these barriers include pharmacological and non-pharmacological interventions.

## 2025-06-01 NOTE — CARE PLAN
The patient's goals for the shift include  pain control, comfort, improvement in cough    The clinical goals for the shift include pain management      Problem: Pain  Goal: Takes deep breaths with improved pain control throughout the shift  Outcome: Progressing  Goal: Turns in bed with improved pain control throughout the shift  Outcome: Progressing  Goal: Walks with improved pain control throughout the shift  Outcome: Progressing  Goal: Performs ADL's with improved pain control throughout shift  Outcome: Progressing  Goal: Participates in PT with improved pain control throughout the shift  Outcome: Progressing  Goal: Free from opioid side effects throughout the shift  Outcome: Progressing  Goal: Free from acute confusion related to pain meds throughout the shift  Outcome: Progressing

## 2025-06-02 ENCOUNTER — APPOINTMENT (OUTPATIENT)
Dept: RADIOLOGY | Facility: HOSPITAL | Age: 50
DRG: 629 | End: 2025-06-02
Payer: COMMERCIAL

## 2025-06-02 ENCOUNTER — ANESTHESIA EVENT (OUTPATIENT)
Dept: OPERATING ROOM | Facility: HOSPITAL | Age: 50
DRG: 629 | End: 2025-06-02
Payer: COMMERCIAL

## 2025-06-02 ENCOUNTER — ANESTHESIA (OUTPATIENT)
Dept: OPERATING ROOM | Facility: HOSPITAL | Age: 50
DRG: 629 | End: 2025-06-02
Payer: COMMERCIAL

## 2025-06-02 LAB
ANION GAP SERPL CALC-SCNC: 15 MMOL/L (ref 10–20)
BACTERIA BLD CULT: NORMAL
BACTERIA BLD CULT: NORMAL
BASOPHILS # BLD AUTO: 0.04 X10*3/UL (ref 0–0.1)
BASOPHILS NFR BLD AUTO: 0.4 %
BUN SERPL-MCNC: 21 MG/DL (ref 6–23)
CALCIUM SERPL-MCNC: 8.7 MG/DL (ref 8.6–10.3)
CHLORIDE SERPL-SCNC: 105 MMOL/L (ref 98–107)
CO2 SERPL-SCNC: 24 MMOL/L (ref 21–32)
CREAT SERPL-MCNC: 1.26 MG/DL (ref 0.5–1.05)
CRP SERPL-MCNC: 1.71 MG/DL
EGFRCR SERPLBLD CKD-EPI 2021: 52 ML/MIN/1.73M*2
EOSINOPHIL # BLD AUTO: 0.15 X10*3/UL (ref 0–0.7)
EOSINOPHIL NFR BLD AUTO: 1.6 %
ERYTHROCYTE [DISTWIDTH] IN BLOOD BY AUTOMATED COUNT: 14 % (ref 11.5–14.5)
ERYTHROCYTE [DISTWIDTH] IN BLOOD BY AUTOMATED COUNT: 14 % (ref 11.5–14.5)
ERYTHROCYTE [SEDIMENTATION RATE] IN BLOOD BY WESTERGREN METHOD: 56 MM/H (ref 0–20)
GLUCOSE BLD MANUAL STRIP-MCNC: 128 MG/DL (ref 74–99)
GLUCOSE BLD MANUAL STRIP-MCNC: 151 MG/DL (ref 74–99)
GLUCOSE BLD MANUAL STRIP-MCNC: 151 MG/DL (ref 74–99)
GLUCOSE BLD MANUAL STRIP-MCNC: 170 MG/DL (ref 74–99)
GLUCOSE SERPL-MCNC: 176 MG/DL (ref 74–99)
HCT VFR BLD AUTO: 37.7 % (ref 36–46)
HCT VFR BLD AUTO: 40.8 % (ref 36–46)
HGB BLD-MCNC: 11.8 G/DL (ref 12–16)
HGB BLD-MCNC: 12.8 G/DL (ref 12–16)
IMM GRANULOCYTES # BLD AUTO: 0.09 X10*3/UL (ref 0–0.7)
IMM GRANULOCYTES NFR BLD AUTO: 1 % (ref 0–0.9)
LYMPHOCYTES # BLD AUTO: 2.29 X10*3/UL (ref 1.2–4.8)
LYMPHOCYTES NFR BLD AUTO: 25.1 %
MCH RBC QN AUTO: 27.3 PG (ref 26–34)
MCH RBC QN AUTO: 27.6 PG (ref 26–34)
MCHC RBC AUTO-ENTMCNC: 31.3 G/DL (ref 32–36)
MCHC RBC AUTO-ENTMCNC: 31.4 G/DL (ref 32–36)
MCV RBC AUTO: 87 FL (ref 80–100)
MCV RBC AUTO: 88 FL (ref 80–100)
MONOCYTES # BLD AUTO: 0.45 X10*3/UL (ref 0.1–1)
MONOCYTES NFR BLD AUTO: 4.9 %
NEUTROPHILS # BLD AUTO: 6.11 X10*3/UL (ref 1.2–7.7)
NEUTROPHILS NFR BLD AUTO: 67 %
NRBC BLD-RTO: 0 /100 WBCS (ref 0–0)
NRBC BLD-RTO: 0 /100 WBCS (ref 0–0)
PLATELET # BLD AUTO: 295 X10*3/UL (ref 150–450)
PLATELET # BLD AUTO: 328 X10*3/UL (ref 150–450)
POTASSIUM SERPL-SCNC: 4.5 MMOL/L (ref 3.5–5.3)
RBC # BLD AUTO: 4.28 X10*6/UL (ref 4–5.2)
RBC # BLD AUTO: 4.69 X10*6/UL (ref 4–5.2)
SODIUM SERPL-SCNC: 139 MMOL/L (ref 136–145)
STAPHYLOCOCCUS SPEC CULT: NORMAL
WBC # BLD AUTO: 8.3 X10*3/UL (ref 4.4–11.3)
WBC # BLD AUTO: 9.1 X10*3/UL (ref 4.4–11.3)

## 2025-06-02 PROCEDURE — 2500000002 HC RX 250 W HCPCS SELF ADMINISTERED DRUGS (ALT 637 FOR MEDICARE OP, ALT 636 FOR OP/ED)

## 2025-06-02 PROCEDURE — 85025 COMPLETE CBC W/AUTO DIFF WBC: CPT

## 2025-06-02 PROCEDURE — 2500000004 HC RX 250 GENERAL PHARMACY W/ HCPCS (ALT 636 FOR OP/ED)

## 2025-06-02 PROCEDURE — 2500000001 HC RX 250 WO HCPCS SELF ADMINISTERED DRUGS (ALT 637 FOR MEDICARE OP)

## 2025-06-02 PROCEDURE — 2780000003 HC OR 278 NO HCPCS

## 2025-06-02 PROCEDURE — C1751 CATH, INF, PER/CENT/MIDLINE: HCPCS

## 2025-06-02 PROCEDURE — 86140 C-REACTIVE PROTEIN: CPT

## 2025-06-02 PROCEDURE — 02HV33Z INSERTION OF INFUSION DEVICE INTO SUPERIOR VENA CAVA, PERCUTANEOUS APPROACH: ICD-10-PCS | Performed by: INTERNAL MEDICINE

## 2025-06-02 PROCEDURE — 73630 X-RAY EXAM OF FOOT: CPT | Mod: RIGHT SIDE | Performed by: STUDENT IN AN ORGANIZED HEALTH CARE EDUCATION/TRAINING PROGRAM

## 2025-06-02 PROCEDURE — 85027 COMPLETE CBC AUTOMATED: CPT | Performed by: INTERNAL MEDICINE

## 2025-06-02 PROCEDURE — 82947 ASSAY GLUCOSE BLOOD QUANT: CPT

## 2025-06-02 PROCEDURE — 2500000004 HC RX 250 GENERAL PHARMACY W/ HCPCS (ALT 636 FOR OP/ED): Performed by: PODIATRIST

## 2025-06-02 PROCEDURE — 36573 INSJ PICC RS&I 5 YR+: CPT

## 2025-06-02 PROCEDURE — A12020 PR CLOSURE SUPERF WND DEHIS SIMPLE: Performed by: ANESTHESIOLOGY

## 2025-06-02 PROCEDURE — 2500000004 HC RX 250 GENERAL PHARMACY W/ HCPCS (ALT 636 FOR OP/ED): Performed by: INTERNAL MEDICINE

## 2025-06-02 PROCEDURE — 2500000004 HC RX 250 GENERAL PHARMACY W/ HCPCS (ALT 636 FOR OP/ED): Performed by: NURSE ANESTHETIST, CERTIFIED REGISTERED

## 2025-06-02 PROCEDURE — 2500000001 HC RX 250 WO HCPCS SELF ADMINISTERED DRUGS (ALT 637 FOR MEDICARE OP): Performed by: INTERNAL MEDICINE

## 2025-06-02 PROCEDURE — A12020 PR CLOSURE SUPERF WND DEHIS SIMPLE: Performed by: NURSE ANESTHETIST, CERTIFIED REGISTERED

## 2025-06-02 PROCEDURE — 3700000001 HC GENERAL ANESTHESIA TIME - INITIAL BASE CHARGE: Performed by: PODIATRIST

## 2025-06-02 PROCEDURE — 2720000007 HC OR 272 NO HCPCS: Performed by: PODIATRIST

## 2025-06-02 PROCEDURE — 7100000002 HC RECOVERY ROOM TIME - EACH INCREMENTAL 1 MINUTE: Performed by: PODIATRIST

## 2025-06-02 PROCEDURE — 1210000001 HC SEMI-PRIVATE ROOM DAILY

## 2025-06-02 PROCEDURE — 3600000007 HC OR TIME - EACH INCREMENTAL 1 MINUTE - PROCEDURE LEVEL TWO: Performed by: PODIATRIST

## 2025-06-02 PROCEDURE — 3600000002 HC OR TIME - INITIAL BASE CHARGE - PROCEDURE LEVEL TWO: Performed by: PODIATRIST

## 2025-06-02 PROCEDURE — 2500000005 HC RX 250 GENERAL PHARMACY W/O HCPCS: Performed by: PODIATRIST

## 2025-06-02 PROCEDURE — 82374 ASSAY BLOOD CARBON DIOXIDE: CPT | Performed by: INTERNAL MEDICINE

## 2025-06-02 PROCEDURE — 85652 RBC SED RATE AUTOMATED: CPT

## 2025-06-02 PROCEDURE — 0JBQ0ZZ EXCISION OF RIGHT FOOT SUBCUTANEOUS TISSUE AND FASCIA, OPEN APPROACH: ICD-10-PCS | Performed by: PODIATRIST

## 2025-06-02 PROCEDURE — 2500000001 HC RX 250 WO HCPCS SELF ADMINISTERED DRUGS (ALT 637 FOR MEDICARE OP): Performed by: PHYSICIAN ASSISTANT

## 2025-06-02 PROCEDURE — 3700000002 HC GENERAL ANESTHESIA TIME - EACH INCREMENTAL 1 MINUTE: Performed by: PODIATRIST

## 2025-06-02 PROCEDURE — 73630 X-RAY EXAM OF FOOT: CPT | Mod: RT

## 2025-06-02 PROCEDURE — 7100000001 HC RECOVERY ROOM TIME - INITIAL BASE CHARGE: Performed by: PODIATRIST

## 2025-06-02 PROCEDURE — 36415 COLL VENOUS BLD VENIPUNCTURE: CPT | Performed by: INTERNAL MEDICINE

## 2025-06-02 RX ORDER — LIDOCAINE HCL/PF 100 MG/5ML
SYRINGE (ML) INTRAVENOUS AS NEEDED
Status: DISCONTINUED | OUTPATIENT
Start: 2025-06-02 | End: 2025-06-02

## 2025-06-02 RX ORDER — BUPIVACAINE HYDROCHLORIDE 5 MG/ML
INJECTION, SOLUTION PERINEURAL AS NEEDED
Status: DISCONTINUED | OUTPATIENT
Start: 2025-06-02 | End: 2025-06-02 | Stop reason: HOSPADM

## 2025-06-02 RX ORDER — LABETALOL HYDROCHLORIDE 5 MG/ML
10 INJECTION, SOLUTION INTRAVENOUS ONCE AS NEEDED
Status: DISCONTINUED | OUTPATIENT
Start: 2025-06-02 | End: 2025-06-02 | Stop reason: HOSPADM

## 2025-06-02 RX ORDER — SCOPOLAMINE 1 MG/3D
1 PATCH, EXTENDED RELEASE TRANSDERMAL ONCE
Status: DISCONTINUED | OUTPATIENT
Start: 2025-06-02 | End: 2025-06-02 | Stop reason: HOSPADM

## 2025-06-02 RX ORDER — FENTANYL CITRATE 50 UG/ML
INJECTION, SOLUTION INTRAMUSCULAR; INTRAVENOUS AS NEEDED
Status: DISCONTINUED | OUTPATIENT
Start: 2025-06-02 | End: 2025-06-02

## 2025-06-02 RX ORDER — MIDAZOLAM HYDROCHLORIDE 1 MG/ML
1 INJECTION, SOLUTION INTRAMUSCULAR; INTRAVENOUS ONCE AS NEEDED
Status: DISCONTINUED | OUTPATIENT
Start: 2025-06-02 | End: 2025-06-02 | Stop reason: HOSPADM

## 2025-06-02 RX ORDER — PROPOFOL 10 MG/ML
INJECTION, EMULSION INTRAVENOUS AS NEEDED
Status: DISCONTINUED | OUTPATIENT
Start: 2025-06-02 | End: 2025-06-02

## 2025-06-02 RX ORDER — SODIUM CHLORIDE, SODIUM LACTATE, POTASSIUM CHLORIDE, CALCIUM CHLORIDE 600; 310; 30; 20 MG/100ML; MG/100ML; MG/100ML; MG/100ML
100 INJECTION, SOLUTION INTRAVENOUS CONTINUOUS
Status: DISCONTINUED | OUTPATIENT
Start: 2025-06-02 | End: 2025-06-02 | Stop reason: HOSPADM

## 2025-06-02 RX ORDER — METOCLOPRAMIDE HYDROCHLORIDE 5 MG/ML
10 INJECTION INTRAMUSCULAR; INTRAVENOUS ONCE AS NEEDED
Status: DISCONTINUED | OUTPATIENT
Start: 2025-06-02 | End: 2025-06-02 | Stop reason: HOSPADM

## 2025-06-02 RX ORDER — ALBUTEROL SULFATE 0.83 MG/ML
2.5 SOLUTION RESPIRATORY (INHALATION) ONCE AS NEEDED
Status: DISCONTINUED | OUTPATIENT
Start: 2025-06-02 | End: 2025-06-02 | Stop reason: HOSPADM

## 2025-06-02 RX ORDER — FAMOTIDINE 10 MG/ML
20 INJECTION, SOLUTION INTRAVENOUS ONCE
Status: DISCONTINUED | OUTPATIENT
Start: 2025-06-02 | End: 2025-06-02 | Stop reason: HOSPADM

## 2025-06-02 RX ORDER — ONDANSETRON HYDROCHLORIDE 2 MG/ML
INJECTION, SOLUTION INTRAVENOUS AS NEEDED
Status: DISCONTINUED | OUTPATIENT
Start: 2025-06-02 | End: 2025-06-02

## 2025-06-02 RX ORDER — MEPERIDINE HYDROCHLORIDE 50 MG/ML
12.5 INJECTION INTRAMUSCULAR; INTRAVENOUS; SUBCUTANEOUS EVERY 10 MIN PRN
Status: DISCONTINUED | OUTPATIENT
Start: 2025-06-02 | End: 2025-06-02 | Stop reason: HOSPADM

## 2025-06-02 RX ORDER — ONDANSETRON HYDROCHLORIDE 2 MG/ML
4 INJECTION, SOLUTION INTRAVENOUS ONCE AS NEEDED
Status: DISCONTINUED | OUTPATIENT
Start: 2025-06-02 | End: 2025-06-02 | Stop reason: HOSPADM

## 2025-06-02 RX ORDER — MORPHINE SULFATE 2 MG/ML
2 INJECTION, SOLUTION INTRAMUSCULAR; INTRAVENOUS EVERY 5 MIN PRN
Status: DISCONTINUED | OUTPATIENT
Start: 2025-06-02 | End: 2025-06-02 | Stop reason: HOSPADM

## 2025-06-02 RX ORDER — CEFAZOLIN SODIUM 1 G/50ML
SOLUTION INTRAVENOUS
Status: COMPLETED
Start: 2025-06-02 | End: 2025-06-02

## 2025-06-02 RX ORDER — ACETAMINOPHEN 325 MG/1
975 TABLET ORAL ONCE
Status: DISCONTINUED | OUTPATIENT
Start: 2025-06-02 | End: 2025-06-02 | Stop reason: HOSPADM

## 2025-06-02 RX ORDER — HYDRALAZINE HYDROCHLORIDE 20 MG/ML
10 INJECTION INTRAMUSCULAR; INTRAVENOUS EVERY 30 MIN PRN
Status: DISCONTINUED | OUTPATIENT
Start: 2025-06-02 | End: 2025-06-02 | Stop reason: HOSPADM

## 2025-06-02 RX ORDER — SUCCINYLCHOLINE CHLORIDE 20 MG/ML INJECTION SOLUTION
SOLUTION AS NEEDED
Status: DISCONTINUED | OUTPATIENT
Start: 2025-06-02 | End: 2025-06-02

## 2025-06-02 RX ORDER — METOPROLOL TARTRATE 1 MG/ML
5 INJECTION, SOLUTION INTRAVENOUS ONCE
Status: DISCONTINUED | OUTPATIENT
Start: 2025-06-02 | End: 2025-06-02 | Stop reason: HOSPADM

## 2025-06-02 RX ORDER — DIPHENHYDRAMINE HYDROCHLORIDE 50 MG/ML
25 INJECTION, SOLUTION INTRAMUSCULAR; INTRAVENOUS ONCE AS NEEDED
Status: DISCONTINUED | OUTPATIENT
Start: 2025-06-02 | End: 2025-06-02 | Stop reason: HOSPADM

## 2025-06-02 RX ORDER — PROMETHAZINE HYDROCHLORIDE 25 MG/ML
INJECTION, SOLUTION INTRAMUSCULAR; INTRAVENOUS AS NEEDED
Status: DISCONTINUED | OUTPATIENT
Start: 2025-06-02 | End: 2025-06-02

## 2025-06-02 RX ORDER — SODIUM CHLORIDE 0.9 G/100ML
INJECTION, SOLUTION IRRIGATION AS NEEDED
Status: DISCONTINUED | OUTPATIENT
Start: 2025-06-02 | End: 2025-06-02 | Stop reason: HOSPADM

## 2025-06-02 RX ORDER — HYDROMORPHONE HYDROCHLORIDE 1 MG/ML
1 INJECTION, SOLUTION INTRAMUSCULAR; INTRAVENOUS; SUBCUTANEOUS EVERY 5 MIN PRN
Status: DISCONTINUED | OUTPATIENT
Start: 2025-06-02 | End: 2025-06-02 | Stop reason: HOSPADM

## 2025-06-02 RX ADMIN — CEFAZOLIN SODIUM 1 G: 1 INJECTION, SOLUTION INTRAVENOUS at 06:37

## 2025-06-02 RX ADMIN — FENTANYL CITRATE 100 MCG: 50 INJECTION, SOLUTION INTRAMUSCULAR; INTRAVENOUS at 15:55

## 2025-06-02 RX ADMIN — PROPOFOL 200 MG: 10 INJECTION, EMULSION INTRAVENOUS at 15:55

## 2025-06-02 RX ADMIN — ONDANSETRON 4 MG: 2 INJECTION INTRAMUSCULAR; INTRAVENOUS at 16:24

## 2025-06-02 RX ADMIN — GUAIFENESIN AND DEXTROMETHORPHAN HYDROBROMIDE 1 TABLET: 600; 30 TABLET, EXTENDED RELEASE ORAL at 10:31

## 2025-06-02 RX ADMIN — BENZONATATE 100 MG: 100 CAPSULE ORAL at 08:51

## 2025-06-02 RX ADMIN — INSULIN LISPRO 2 UNITS: 100 INJECTION, SOLUTION INTRAVENOUS; SUBCUTANEOUS at 13:12

## 2025-06-02 RX ADMIN — DAPAGLIFLOZIN 5 MG: 5 TABLET, FILM COATED ORAL at 08:50

## 2025-06-02 RX ADMIN — SODIUM CHLORIDE, POTASSIUM CHLORIDE, SODIUM LACTATE AND CALCIUM CHLORIDE: 600; 310; 30; 20 INJECTION, SOLUTION INTRAVENOUS at 15:47

## 2025-06-02 RX ADMIN — BENZONATATE 100 MG: 100 CAPSULE ORAL at 21:25

## 2025-06-02 RX ADMIN — LIDOCAINE HYDROCHLORIDE 100 MG: 20 INJECTION, SOLUTION INTRAVENOUS at 15:55

## 2025-06-02 RX ADMIN — ICOSAPENT ETHYL 2 G: 1 CAPSULE ORAL at 19:08

## 2025-06-02 RX ADMIN — OXYCODONE HYDROCHLORIDE 5 MG: 5 TABLET ORAL at 10:30

## 2025-06-02 RX ADMIN — CETIRIZINE HYDROCHLORIDE 10 MG: 10 TABLET, FILM COATED ORAL at 08:48

## 2025-06-02 RX ADMIN — OXYCODONE HYDROCHLORIDE 5 MG: 5 TABLET ORAL at 06:36

## 2025-06-02 RX ADMIN — CEFAZOLIN SODIUM 1 G: 1 INJECTION, SOLUTION INTRAVENOUS at 16:02

## 2025-06-02 RX ADMIN — OXYCODONE HYDROCHLORIDE 5 MG: 5 TABLET ORAL at 19:07

## 2025-06-02 RX ADMIN — OXYCODONE HYDROCHLORIDE 5 MG: 5 TABLET ORAL at 23:07

## 2025-06-02 RX ADMIN — PROPOFOL 20 MG: 10 INJECTION, EMULSION INTRAVENOUS at 16:41

## 2025-06-02 RX ADMIN — ICOSAPENT ETHYL 2 G: 1 CAPSULE ORAL at 08:47

## 2025-06-02 RX ADMIN — INSULIN LISPRO 2 UNITS: 100 INJECTION, SOLUTION INTRAVENOUS; SUBCUTANEOUS at 08:48

## 2025-06-02 RX ADMIN — PANTOPRAZOLE SODIUM 40 MG: 40 TABLET, DELAYED RELEASE ORAL at 08:48

## 2025-06-02 RX ADMIN — GUAIFENESIN AND DEXTROMETHORPHAN HYDROBROMIDE 1 TABLET: 600; 30 TABLET, EXTENDED RELEASE ORAL at 23:07

## 2025-06-02 RX ADMIN — LISINOPRIL 10 MG: 10 TABLET ORAL at 08:48

## 2025-06-02 RX ADMIN — METOPROLOL SUCCINATE 25 MG: 25 TABLET, EXTENDED RELEASE ORAL at 21:25

## 2025-06-02 RX ADMIN — CEFAZOLIN SODIUM 1 G: 1 INJECTION, SOLUTION INTRAVENOUS at 21:49

## 2025-06-02 RX ADMIN — PROMETHAZINE HYDROCHLORIDE 6.25 MG: 25 INJECTION INTRAMUSCULAR; INTRAVENOUS at 16:42

## 2025-06-02 RX ADMIN — Medication 100 MG: at 15:55

## 2025-06-02 SDOH — HEALTH STABILITY: MENTAL HEALTH: CURRENT SMOKER: 0

## 2025-06-02 ASSESSMENT — PAIN - FUNCTIONAL ASSESSMENT
PAIN_FUNCTIONAL_ASSESSMENT: 0-10

## 2025-06-02 ASSESSMENT — PAIN DESCRIPTION - DESCRIPTORS
DESCRIPTORS: ACHING;SHARP;SHOOTING
DESCRIPTORS: ACHING;BURNING
DESCRIPTORS: SHARP

## 2025-06-02 ASSESSMENT — COGNITIVE AND FUNCTIONAL STATUS - GENERAL
WALKING IN HOSPITAL ROOM: A LITTLE
CLIMB 3 TO 5 STEPS WITH RAILING: A LITTLE
TOILETING: A LITTLE
DAILY ACTIVITIY SCORE: 23
MOBILITY SCORE: 22

## 2025-06-02 ASSESSMENT — PAIN SCALES - GENERAL
PAINLEVEL_OUTOF10: 0 - NO PAIN
PAIN_LEVEL: 0
PAINLEVEL_OUTOF10: 6
PAINLEVEL_OUTOF10: 0 - NO PAIN
PAINLEVEL_OUTOF10: 3
PAINLEVEL_OUTOF10: 0 - NO PAIN
PAINLEVEL_OUTOF10: 0 - NO PAIN
PAINLEVEL_OUTOF10: 8
PAINLEVEL_OUTOF10: 4
PAINLEVEL_OUTOF10: 7
PAINLEVEL_OUTOF10: 0 - NO PAIN

## 2025-06-02 ASSESSMENT — PAIN DESCRIPTION - ORIENTATION
ORIENTATION: RIGHT
ORIENTATION: RIGHT

## 2025-06-02 ASSESSMENT — PAIN DESCRIPTION - LOCATION
LOCATION: FOOT

## 2025-06-02 NOTE — PROGRESS NOTES
Patricia Gibbs is a 49 y.o. female on day 5 of admission presenting with Cellulitis.      Subjective   No events overnight. Patient seen and examined at bedside. She has no complaints. She is going for surgery at 4pm.       Objective     Last Recorded Vitals  /63 (BP Location: Right arm, Patient Position: Lying)   Pulse 68   Temp 36.8 °C (98.2 °F) (Temporal)   Resp 18   Wt 125 kg (275 lb 9.2 oz)   SpO2 94%   Intake/Output last 3 Shifts:    Intake/Output Summary (Last 24 hours) at 6/2/2025 1038  Last data filed at 6/1/2025 1458  Gross per 24 hour   Intake 50 ml   Output --   Net 50 ml       Admission Weight  Weight: 125 kg (275 lb) (05/28/25 1923)    Daily Weight  05/30/25 : 125 kg (275 lb 9.2 oz)    Image Results  Bedside PICC Imaging  These images are not reportable by radiology and will not be interpreted   by  Radiologists.      Physical Exam  Constitutional:       Appearance: Normal appearance. She is obese.   HENT:      Head: Normocephalic and atraumatic.      Nose: Nose normal.      Mouth/Throat:      Mouth: Mucous membranes are moist.      Pharynx: Oropharynx is clear.   Eyes:      Extraocular Movements: Extraocular movements intact.      Conjunctiva/sclera: Conjunctivae normal.      Pupils: Pupils are equal, round, and reactive to light.   Cardiovascular:      Rate and Rhythm: Normal rate and regular rhythm.      Pulses: Normal pulses.      Heart sounds: Normal heart sounds.   Pulmonary:      Effort: Pulmonary effort is normal.      Breath sounds: Normal breath sounds.   Abdominal:      General: Bowel sounds are normal.      Palpations: Abdomen is soft.   Musculoskeletal:         General: Normal range of motion.      Cervical back: Normal range of motion and neck supple.   Skin:     General: Skin is warm and dry.      Capillary Refill: Capillary refill takes 2 to 3 seconds.      Comments: Right 1st digit with significant erythema and edema with serosanguineoious drainage noted.  Fat layer  exposed, no tunneling. Erythema extends to 4th digit and extends up partial forefoot. MSP's intact.   Neurological:      General: No focal deficit present.      Mental Status: She is alert and oriented to person, place, and time.   Psychiatric:         Mood and Affect: Mood normal.         Behavior: Behavior normal.         Thought Content: Thought content normal.         Judgment: Judgment normal.      Relevant Results                              Assessment & Plan  Cellulitis    Diabetes (Multi)    SAMANTHA (acute kidney injury)    Elevated C-reactive protein (CRP)    Leukocytosis    Acute osteomyelitis of right foot (Multi)    49 year old female who presented to ED today with infected right foot. Podiatry consulted. Concern for osteomyelitis. Continue antibiotics. Wound culture ordered. Patient to be admitted to hospital for further medical management.     Cellulitis of right great toe  Diabetic ulcer  SAMANTHA  Elevated inflammatory markers  Leukocytosis  Severe Charcot deformity of right foot  Admit to inpatient/telemetry to Dr. Cecil Ibanez  Podiatry consult and appreciate recs  See imaging results  Wound culture obtained in podiatry office  Follow blood cultures  Continue Zosyn and vancomycin  SSI with hypoglycemic protocol  Repeat labs in AM     Chronic conditions  #CAD, HTN, HLD, lung nodule  Continue home meds as appropriate when nursing completes home med rec.         Full code     DVT prophylaxis  Lovenox SQ  SCD's, as tolerated     5/30: MRI showed high likelihood of OM of right great toe. Patient underwent I&D with bone resection of right great toe. Continue IV antibiotics for now. Will discuss antibiotic regimen for discharge with podiatry.     6/1: Patient will need 6 weeks of IV antibiotics per ID. Patient is hoping to do this from home and to work from home during this time. She will likely get a PICC tomorrow. She will go back to the OR tomorrow for delayed closure as well.    6/2: Wound culture is  growing MSSA. PICC line placed today for IV antibiotics. Creatinine stable at 1.2, this might be her new baseline from progression of CKD. Patient to go for delayed closure today. Possible discharge tomorrow.           Cecil Ibanez, DO

## 2025-06-02 NOTE — PROGRESS NOTES
Physical Therapy                 Therapy Communication Note    Patient Name: Patricia Gibbs  MRN: [id number]  Department: [facility]  Room: [address] [place] [address]  Today's Date: 6/2/2025     Discipline: Physical Therapy     PT SCREEN: Pt. ind. ambulating in room without device.  No concerns regarding managing her adls at home. No skilled PT needs. Complete therapy orders.

## 2025-06-02 NOTE — CARE PLAN
The patient's goals for the shift include  pain control    The clinical goals for the shift include go for surgery today      Problem: Pain - Adult  Goal: Verbalizes/displays adequate comfort level or baseline comfort level  Outcome: Progressing     Problem: Safety - Adult  Goal: Free from fall injury  Outcome: Progressing

## 2025-06-02 NOTE — PROGRESS NOTES
Occupational Therapy                 Therapy Communication Note    Patient Name: Patricia Gibbs  MRN: [id number]  Department: [facility]  Room: [address] [place] [address]  Today's Date: 6/2/2025     Discipline: Occupational Therapy     OT SCREEN: Pt. ind. ambulating in room without device. Ind. with toileting. No concerns regarding managing her adls at home. No skilled OT needs. Complete therapy orders.     Missed Time: Attempt

## 2025-06-02 NOTE — ANESTHESIA PREPROCEDURE EVALUATION
Patient: Patricia Gibbs    Procedure Information       Date/Time: 06/02/25 1610    Procedure: RIGHT FOOT CLOSURE SURGICAL WOUND (Right)    Location: [facility] / [facility]    Surgeons: Carla Eden DPM            Relevant Problems   Cardiac   (+) Coronary artery disease involving native coronary artery of native heart with unstable angina pectoris   (+) Hypertension   (+) Mixed hyperlipidemia   (+) STEMI (ST elevation myocardial infarction) (Multi)   (+) Stented coronary artery      Pulmonary   (+) SOB (shortness of breath) on exertion      Endocrine   (+) Controlled type 2 diabetes mellitus without complication      HEENT   (+) Acute non-recurrent pansinusitis   (+) Sinus congestion      ID   (+) Acute osteomyelitis of right foot (Multi)      GYN   (+) Menorrhagia       Clinical information reviewed:    Allergies  Meds  Problems    OB Status           NPO Detail:  NPO/Void Status  Date of Last Liquid: 06/02/25  Time of Last Liquid: 0900  Date of Last Solid: 06/01/25  Time of Last Solid: 1800         Physical Exam    Airway  Mallampati: II  TM distance: >3 FB  Neck ROM: full     Cardiovascular - normal exam  Rhythm: regular  Rate: normal     Dental    Pulmonary - normal exam   Abdominal            Anesthesia Plan    History of general anesthesia?: yes  History of complications of general anesthesia?: no    ASA 3     MAC     The patient is not a current smoker.  Education provided regarding risk of obstructive sleep apnea.  intravenous induction   Anesthetic plan and risks discussed with patient.    Plan discussed with CRNA, CAA and attending.

## 2025-06-02 NOTE — PROGRESS NOTES
Plan is for PICC line insertion today.  Asking  home infusion to price check on IV Ancef 1 Gram q 8 hrs.   Pt will need to return to surgery this evening for closure of foot wound.  Planning on dc tomorrow.  Vanessa Norton RN [facility]

## 2025-06-02 NOTE — DISCHARGE INSTRUCTIONS
PODIATRIC SURGERY POST-OP INSTRUCTIONS    YOU HAD ANESTHESIA:  You must have a responsible adult drive you home and stay with you for the first 24 hours.    Do not drive a car or drink any alcohol for 24 hours after surgery.  Do not do any strenuous work or activity for the next 24 hours.  You may have mild nausea, a sore throat or raspy voice for a few days.        POST-OP INSTRUCTIONS:  Keep dressing clean, dry and intact until your first post-operative appointment.  Do not remove surgical dressing. You may need to loosen if necessary.   Do not shower unless using a cast protector to protect dressing.  If dressing gets wet, please call office immediately as this can lead to increased risk of infection or wound dehiscence.   Elevate surgical extremity as much as possible to help with pain and swelling.  Weight Bearing Status: Bear weight as tolerated in surgical shoe.  Please resume all home medications.   Post-Operative Medications: You may take Tylenol for pain; please take as directed on bottle.  Post-operative appointment with Dr. Eden with in one week of discharge. Please call to schedule if not already scheduled.  Should any problems, questions, or concerns arise, please call the clinic office.    WHEN TO CALL YOUR DOCTOR:  Fever (>100.4°F or 38.0°C) or chills.  Incision problems such as redness, warmth, swelling, or foul smelling drainage.  Severe nausea or persistent vomiting.  Pain and swelling in your legs, especially if it is only on one side and not the other.  Pain with urination, cloudy urine, or foul smelling urine.  Or if you have any other problems or questions.  CALL 911 or go to the ED if you have any shortness of breath, difficulty breathing, or chest pain.

## 2025-06-02 NOTE — OP NOTE
RIGHT FOOT CLOSURE SURGICAL WOUND (R) Operative Note     Date: 2025  OR Location: [facility]    Name: Patricia Gibbs, : 1975, Age: 49 y.o., MRN: [id number], Sex: female    Diagnosis  Pre-op Diagnosis      * Cellulitis [L03.90]     * Acute osteomyelitis of right foot (Multi) [M86.171] Post-op Diagnosis     * Cellulitis [L03.90]     * Acute osteomyelitis of right foot (Multi) [M86.171]     Procedures  RIGHT FOOT CLOSURE SURGICAL WOUND  26572 - [place] TX SUPERFICIAL WOUND DEHISCENCE SIMPLE CLOSURE      Surgeons      * Carla Eden - Primary    Resident/Fellow/Other Assistant:  Surgeons and Role:  * No surgeons found with a matching role *    Staff:   Circulator: Danyelle Villalpando Person: Maren Villalpando Person: Leif    Anesthesia Staff: Anesthesiologist: Corby Geller MD  CRNA: PENNIE Arellano-CRNA    Procedure Summary  Anesthesia: Monitor Anesthesia Care  ASA: III  Estimated Blood Loss: 0mL  Intra-op Medications:   Administrations occurring from 1610 to 1710 on 25:   Medication Name Total Dose   BUPivacaine HCl (Marcaine) 0.5 % (5 mg/mL) injection 10 mL   icosapent ethyL (Vascepa) capsule 2 g Cannot be calculated              Anesthesia Record               Intraprocedure I/O Totals          Intake    ceFAZolin (Ancef) 1 g in dextrose (iso) IV 50 mL 50.00 mL    Total Intake 50 mL          Specimen: No specimens collected              Drains and/or Catheters: * None in log *    Tourniquet Times:     Total Tourniquet Time Documented:  Calf (Right) - 26 minutes  Total: Calf (Right) - 26 minutes      Implants:     Findings: Osteo and DFU 1st MPJ right foot    Indications: Patricia Gibbs is an 49 y.o. female who is having surgery for Cellulitis [L03.90]  Acute osteomyelitis of right foot (Multi) [M86.171].     The patient was seen in the preoperative area. The risks, benefits, complications, treatment options, non-operative alternatives, expected recovery and outcomes were discussed with the  patient. The possibilities of reaction to medication, pulmonary aspiration, injury to surrounding structures, bleeding, recurrent infection, the need for additional procedures, failure to diagnose a condition, and creating a complication requiring transfusion or operation were discussed with the patient. The patient concurred with the proposed plan, giving informed consent.  The site of surgery was properly noted/marked if necessary per policy. The patient has been actively warmed in preoperative area. Preoperative antibiotics are not indicated. Venous thrombosis prophylaxis are not indicated.    Procedure Details: 49-year-old white female being brought back to surgery for delayed primary closure she is 3 days status post complex incision and drainage and metatarsal resection for osteomyelitis and diabetic foot ulcer with severe cellulitis and necrosis her white count is improved dramatically wound is granulating well she currently has a PICC line and is ready for long-term IV antibiotics.  Consent form review signed and witnessed patient is aware of all the risks and benefits and understand there is always a possibility of amputation.    Huddle and timeout completed patient taken to the operative room was given general anesthesia by anesthesiologist right foot was prepped scrubbed and draped in usual aseptic manner.    Attention directed to the first MPJ packing was removed margins were debrided with sharp blunt dissection all nonviable tissue debrided and excised with rongeur further deep dissection performed the base of the proximal phalanx was resected in a transverse fashion with a sagittal saw excised in toto.  Pulse lavage irrigation 6 L of normal saline.  The wound was further debrided through the subfascial layer all nonviable tissue was excised with tissue forceps and rongeur.  It appeared to be clean and ready for closure at this time deep fascia and capsule closed with 3-0 Vicryl subcu closed with 4-0  Vicryl skin closed with 3-0 nylon a postoperative block 0.5% Marcaine plain administered 9 dressing and surgical shoe applied.    Patient taken recovery room vital signs stable good condition she tolerated procedure and anesthesia well she will be readmitted we will continue to follow her until she is ready for discharge.  Evidence of Infection: No   Complications:  None; patient tolerated the procedure well.    Disposition: PACU - hemodynamically stable.  Condition: stable                 Additional Details:     Attending Attestation: I was present and scrubbed for the entire procedure.    Carla Eden  Phone Number: [phone]

## 2025-06-02 NOTE — ANESTHESIA POSTPROCEDURE EVALUATION
Patient: Patricia Gibbs    Procedure Summary       Date: 06/02/25 Room / Location: [facility] / [facility]    Anesthesia Start: 1547 Anesthesia Stop: 1702    Procedure: RIGHT FOOT CLOSURE SURGICAL WOUND (Right) Diagnosis:       Cellulitis      Acute osteomyelitis of right foot (Multi)      (Cellulitis [L03.90])      (Acute osteomyelitis of right foot (Multi) [M86.171])    Surgeons: Carla Eden DPM Responsible Provider: Corby Geller MD    Anesthesia Type: MAC ASA Status: 3            Anesthesia Type: MAC    Vitals Value Taken Time   /71 06/02/25 17:00   Temp 36 06/02/25 17:03   Pulse 82 06/02/25 17:01   Resp 16 06/02/25 17:00   SpO2 100 % 06/02/25 17:01   Vitals shown include unfiled device data.    Anesthesia Post Evaluation    Patient location during evaluation: PACU  Patient participation: complete - patient participated  Level of consciousness: awake and alert  Pain score: 0  Pain management: adequate  Airway patency: patent  Cardiovascular status: acceptable  Respiratory status: acceptable  Hydration status: acceptable  Postoperative Nausea and Vomiting: none        There were no known notable events for this encounter.

## 2025-06-02 NOTE — NURSING NOTE
PICC procedural note:  4 [place] single lumen PICC line placed through patient left basilic vein. Trimmed at 52 cm, 1 cm out, AC 48 cm. Modified seldinger technique used throughout procedure and patient tolerated well. Lot #/ PICC card/ PICC education provided to patient who verbalized understanding.  PICC ok to use verified by sher[employer] ECG technology. Report called to IDA Moya.

## 2025-06-02 NOTE — ANESTHESIA PROCEDURE NOTES
Airway  Date/Time: 6/2/2025 3:59 PM  Reason: elective    Airway not difficult    Staffing  Performed: CRNA   Authorized by: Corby Geller MD    Performed by: PENNIE Arellaon-AXEL  Patient location during procedure: OR    Patient Condition  Indications for airway management: anesthesia  Patient position: sniffing  Planned trial extubation  Sedation level: deep     Final Airway Details   Preoxygenated: yes  Final airway type: endotracheal airway  Successful airway: ETT  Cuffed: yes   Successful intubation technique: video laryngoscopy  Adjuncts used in placement: intubating stylet and cricoid pressure  Endotracheal tube insertion site: oral  Blade: Nydia  Blade size: #3  ETT size (mm): 7.0  Cormack-Lehane Classification: grade I - full view of glottis  Placement verified by: chest auscultation   Measured from: lips  ETT to lips (cm): 20  Number of attempts at approach: 1

## 2025-06-02 NOTE — CARE PLAN
Notified tele order  chart review and conversation with nurse revealed pt has been stable and without CP or arrhythmias.  Transfer to surgical floor.

## 2025-06-02 NOTE — CARE PLAN
Problem: Pain - Adult  Goal: Verbalizes/displays adequate comfort level or baseline comfort level  Outcome: Progressing     Problem: Safety - Adult  Goal: Free from fall injury  Outcome: Progressing     Problem: Discharge Planning  Goal: Discharge to home or other facility with appropriate resources  Outcome: Progressing     Problem: Chronic Conditions and Co-morbidities  Goal: Patient's chronic conditions and co-morbidity symptoms are monitored and maintained or improved  Outcome: Progressing     Problem: Nutrition  Goal: Nutrient intake appropriate for maintaining nutritional needs  Outcome: Progressing     Problem: Diabetes  Goal: Achieve decreasing blood glucose levels by end of shift  Outcome: Progressing  Goal: Increase stability of blood glucose readings by end of shift  Outcome: Progressing  Goal: Decrease in ketones present in urine by end of shift  Outcome: Progressing  Goal: Maintain electrolyte levels within acceptable range throughout shift  Outcome: Progressing  Goal: Maintain glucose levels >70mg/dl to <250mg/dl throughout shift  Outcome: Progressing  Goal: No changes in neurological exam by end of shift  Outcome: Progressing  Goal: Learn about and adhere to nutrition recommendations by end of shift  Outcome: Progressing  Goal: Vital signs within normal range for age by end of shift  Outcome: Progressing  Goal: Increase self care and/or family involovement by end of shift  Outcome: Progressing  Goal: Receive DSME education by end of shift  Outcome: Progressing     Problem: Pain  Goal: Takes deep breaths with improved pain control throughout the shift  Outcome: Progressing  Goal: Turns in bed with improved pain control throughout the shift  Outcome: Progressing  Goal: Walks with improved pain control throughout the shift  Outcome: Progressing  Goal: Performs ADL's with improved pain control throughout shift  Outcome: Progressing  Goal: Participates in PT with improved pain control throughout the  shift  Outcome: Progressing  Goal: Free from opioid side effects throughout the shift  Outcome: Progressing  Goal: Free from acute confusion related to pain meds throughout the shift  Outcome: Progressing     Problem: Fall/Injury  Goal: Not fall by end of shift  Outcome: Progressing  Goal: Be free from injury by end of the shift  Outcome: Progressing  Goal: Verbalize understanding of personal risk factors for fall in the hospital  Outcome: Progressing  Goal: Verbalize understanding of risk factor reduction measures to prevent injury from fall in the home  Outcome: Progressing  Goal: Use assistive devices by end of the shift  Outcome: Progressing  Goal: Pace activities to prevent fatigue by end of the shift  Outcome: Progressing   The patient's goals for the shift include      The clinical goals for the shift include pain management    Over the shift, the patient did make progress toward the following goals. Barriers to progression include ongoing foot pain. Recommendations to address these barriers include pharmacological and non-pharmacological interventions.

## 2025-06-03 ENCOUNTER — HOME INFUSION (OUTPATIENT)
Dept: INFUSION THERAPY | Age: 50
End: 2025-06-03

## 2025-06-03 ENCOUNTER — HOME HEALTH ADMISSION (OUTPATIENT)
Dept: HOME HEALTH SERVICES | Facility: HOME HEALTH | Age: 50
End: 2025-06-03
Payer: COMMERCIAL

## 2025-06-03 ENCOUNTER — DOCUMENTATION (OUTPATIENT)
Dept: HOME HEALTH SERVICES | Facility: HOME HEALTH | Age: 50
End: 2025-06-03

## 2025-06-03 ENCOUNTER — APPOINTMENT (OUTPATIENT)
Dept: PRIMARY CARE | Facility: CLINIC | Age: 50
End: 2025-06-03
Payer: COMMERCIAL

## 2025-06-03 VITALS
HEIGHT: 64 IN | RESPIRATION RATE: 18 BRPM | SYSTOLIC BLOOD PRESSURE: 175 MMHG | HEART RATE: 74 BPM | TEMPERATURE: 95.4 F | OXYGEN SATURATION: 91 % | BODY MASS INDEX: 47.05 KG/M2 | DIASTOLIC BLOOD PRESSURE: 73 MMHG | WEIGHT: 275.57 LBS

## 2025-06-03 LAB
ANION GAP SERPL CALC-SCNC: 13 MMOL/L (ref 10–20)
BACTERIA SPEC AEROBE CULT: ABNORMAL
BACTERIA SPEC ANAEROBE CULT: ABNORMAL
BUN SERPL-MCNC: 20 MG/DL (ref 6–23)
CALCIUM SERPL-MCNC: 9 MG/DL (ref 8.6–10.3)
CHLORIDE SERPL-SCNC: 105 MMOL/L (ref 98–107)
CO2 SERPL-SCNC: 25 MMOL/L (ref 21–32)
CREAT SERPL-MCNC: 1.29 MG/DL (ref 0.5–1.05)
EGFRCR SERPLBLD CKD-EPI 2021: 51 ML/MIN/1.73M*2
ERYTHROCYTE [DISTWIDTH] IN BLOOD BY AUTOMATED COUNT: 14.1 % (ref 11.5–14.5)
GLUCOSE BLD MANUAL STRIP-MCNC: 163 MG/DL (ref 74–99)
GLUCOSE BLD MANUAL STRIP-MCNC: 202 MG/DL (ref 74–99)
GLUCOSE SERPL-MCNC: 166 MG/DL (ref 74–99)
HCT VFR BLD AUTO: 39.4 % (ref 36–46)
HGB BLD-MCNC: 12.4 G/DL (ref 12–16)
MCH RBC QN AUTO: 27.7 PG (ref 26–34)
MCHC RBC AUTO-ENTMCNC: 31.5 G/DL (ref 32–36)
MCV RBC AUTO: 88 FL (ref 80–100)
NRBC BLD-RTO: 0 /100 WBCS (ref 0–0)
PLATELET # BLD AUTO: 312 X10*3/UL (ref 150–450)
POTASSIUM SERPL-SCNC: 4.5 MMOL/L (ref 3.5–5.3)
RBC # BLD AUTO: 4.48 X10*6/UL (ref 4–5.2)
SODIUM SERPL-SCNC: 138 MMOL/L (ref 136–145)
WBC # BLD AUTO: 9.9 X10*3/UL (ref 4.4–11.3)

## 2025-06-03 PROCEDURE — 2500000001 HC RX 250 WO HCPCS SELF ADMINISTERED DRUGS (ALT 637 FOR MEDICARE OP)

## 2025-06-03 PROCEDURE — 2500000004 HC RX 250 GENERAL PHARMACY W/ HCPCS (ALT 636 FOR OP/ED)

## 2025-06-03 PROCEDURE — 80048 BASIC METABOLIC PNL TOTAL CA: CPT

## 2025-06-03 PROCEDURE — 2500000002 HC RX 250 W HCPCS SELF ADMINISTERED DRUGS (ALT 637 FOR MEDICARE OP, ALT 636 FOR OP/ED)

## 2025-06-03 PROCEDURE — 82947 ASSAY GLUCOSE BLOOD QUANT: CPT

## 2025-06-03 PROCEDURE — 85027 COMPLETE CBC AUTOMATED: CPT

## 2025-06-03 RX ORDER — POLYETHYLENE GLYCOL 3350 17 G/17G
17 POWDER, FOR SOLUTION ORAL DAILY
Qty: 30 PACKET | Refills: 0 | Status: SHIPPED | OUTPATIENT
Start: 2025-06-03

## 2025-06-03 RX ORDER — CEFAZOLIN 1 G/1
1 INJECTION, POWDER, FOR SOLUTION INTRAVENOUS EVERY 8 HOURS
Qty: 111000 MG | Refills: 0 | Status: SHIPPED
Start: 2025-06-03 | End: 2025-07-10

## 2025-06-03 RX ORDER — OXYCODONE HYDROCHLORIDE 5 MG/1
5 TABLET ORAL EVERY 4 HOURS PRN
Qty: 15 TABLET | Refills: 0 | Status: SHIPPED | OUTPATIENT
Start: 2025-06-03

## 2025-06-03 RX ORDER — HYDROXYZINE HYDROCHLORIDE 10 MG/1
10 TABLET, FILM COATED ORAL EVERY 4 HOURS PRN
Qty: 30 TABLET | Refills: 0 | Status: SHIPPED | OUTPATIENT
Start: 2025-06-03

## 2025-06-03 RX ORDER — HYDROXYZINE HYDROCHLORIDE 10 MG/1
10 TABLET, FILM COATED ORAL EVERY 4 HOURS PRN
Status: DISCONTINUED | OUTPATIENT
Start: 2025-06-03 | End: 2025-06-03 | Stop reason: HOSPADM

## 2025-06-03 RX ORDER — BENZONATATE 100 MG/1
100 CAPSULE ORAL 3 TIMES DAILY
Qty: 30 CAPSULE | Refills: 0 | Status: SHIPPED | OUTPATIENT
Start: 2025-06-03

## 2025-06-03 RX ADMIN — BENZONATATE 100 MG: 100 CAPSULE ORAL at 08:47

## 2025-06-03 RX ADMIN — ICOSAPENT ETHYL 2 G: 1 CAPSULE ORAL at 08:48

## 2025-06-03 RX ADMIN — DAPAGLIFLOZIN 5 MG: 5 TABLET, FILM COATED ORAL at 08:48

## 2025-06-03 RX ADMIN — PANTOPRAZOLE SODIUM 40 MG: 40 TABLET, DELAYED RELEASE ORAL at 08:47

## 2025-06-03 RX ADMIN — LISINOPRIL 10 MG: 10 TABLET ORAL at 08:48

## 2025-06-03 RX ADMIN — CETIRIZINE HYDROCHLORIDE 10 MG: 10 TABLET, FILM COATED ORAL at 08:48

## 2025-06-03 RX ADMIN — CEFAZOLIN SODIUM 1 G: 1 INJECTION, SOLUTION INTRAVENOUS at 06:23

## 2025-06-03 RX ADMIN — INSULIN LISPRO 4 UNITS: 100 INJECTION, SOLUTION INTRAVENOUS; SUBCUTANEOUS at 12:06

## 2025-06-03 RX ADMIN — INSULIN LISPRO 2 UNITS: 100 INJECTION, SOLUTION INTRAVENOUS; SUBCUTANEOUS at 08:47

## 2025-06-03 RX ADMIN — CEFAZOLIN SODIUM 1 G: 1 INJECTION, SOLUTION INTRAVENOUS at 14:18

## 2025-06-03 RX ADMIN — BENZONATATE 100 MG: 100 CAPSULE ORAL at 14:18

## 2025-06-03 RX ADMIN — OXYCODONE HYDROCHLORIDE 5 MG: 5 TABLET ORAL at 08:47

## 2025-06-03 RX ADMIN — POLYETHYLENE GLYCOL 3350 17 G: 17 POWDER, FOR SOLUTION ORAL at 08:47

## 2025-06-03 ASSESSMENT — COGNITIVE AND FUNCTIONAL STATUS - GENERAL
MOBILITY SCORE: 22
TOILETING: A LITTLE
DAILY ACTIVITIY SCORE: 23
CLIMB 3 TO 5 STEPS WITH RAILING: A LITTLE
WALKING IN HOSPITAL ROOM: A LITTLE

## 2025-06-03 ASSESSMENT — PAIN - FUNCTIONAL ASSESSMENT
PAIN_FUNCTIONAL_ASSESSMENT: 0-10
PAIN_FUNCTIONAL_ASSESSMENT: 0-10

## 2025-06-03 ASSESSMENT — PAIN SCALES - GENERAL
PAINLEVEL_OUTOF10: 2
PAINLEVEL_OUTOF10: 6
PAINLEVEL_OUTOF10: 2

## 2025-06-03 NOTE — PROGRESS NOTES
REVIEWED PT INFO AS CORRECT.   DX: MSSA Diabetic Foot Wound    REVIEWED ALLERGIES: Montelukast, Codeine  PMH: DM, CAD/STEMI post stent, HLD, HTN  NO MEDICATION INTERACTIONS.  REVIEWED RELEVANT BASELINE VALUES  LABS: [facility] - BMP, CBC, ESR, CRP  PT HAS SL PICC  FLUSH PER [facility] PROTOCOL.  CONTINUE MED THRU TENTATIVE STOP DATE: 7/10/25    Cefazolin 1g IV q8h IV push    CARE PLAN DONE TODAY    Patient is a 49 year old female admitted to homecare to complete course of IV Cefazolin for a diabetic foot wound  Creatinine 1.29, GFR 51 at discharge - dosed 1g q8h -     Labs and follow up with Dr Gabriel    Telephone call with patient - address in [facility] correct - answered all questions - delivery this pm    Dispense x21 doses of Cefazolin in syringes for cmpd and delivery on 6/3  Doses 6/4 through 6/10    NF 6/9 OVN check labs and progress

## 2025-06-03 NOTE — HH CARE COORDINATION
Home Care received a Referral for Infusion, Nursing, Physical Therapy, Occupational Therapy, and Home Health Aide. We have processed the referral for a Start of Care on 06/04/2025.     If you have any questions or concerns, please feel free to contact us at [phone]. Follow the prompts, enter your five digit zip code, and you will be directed to your care team on WE[facility] 3.

## 2025-06-03 NOTE — NURSING NOTE
Discharge instructions given. Medications and appointments discussed. Patient verbalized understanding. Transport report placed to take patient to husbands car. No acute distress noted.

## 2025-06-03 NOTE — CARE PLAN
The patient's goals for the shift include      The clinical goals for the shift include go for surgery today    Over the shift, the patient did not make progress toward the following goals. Barriers to progression include Pt has new surgical incision to R foot. Recommendations to address these barriers include Maintain safety; Maintain stable vitals.

## 2025-06-03 NOTE — PROGRESS NOTES
Infectious disease progress note  Subjective   Right first toe osteomyelitis MSSA  Right Charcot foot  6-2-2025 status post right foot closure    Antibiotics  Ancef day 5 out of 42 days    Objective   Range of Vitals (last 24 hours)  Heart Rate:  [72-93]   Temp:  [35.2 °C (95.4 °F)-37 °C (98.6 °F)]   Resp:  [16-18]   BP: (129-175)/(59-80)   SpO2:  [91 %-100 %]   Daily Weight  05/30/25 : 125 kg (275 lb 9.2 oz)    Body mass index is 47.63 kg/m².      Physical Exam  Patient sitting in chair speaking on phone  Foot is wrapped  PICC line was placed in left arm      Relevant Results  Labs  Lab Results   Component Value Date    WBC 9.9 06/03/2025    HGB 12.4 06/03/2025    HCT 39.4 06/03/2025    MCV 88 06/03/2025     06/03/2025     Lab Results   Component Value Date    GLUCOSE 166 (H) 06/03/2025    CALCIUM 9.0 06/03/2025     06/03/2025    K 4.5 06/03/2025    CO2 25 06/03/2025     06/03/2025    BUN 20 06/03/2025    CREATININE 1.29 (H) 06/03/2025   ESR: --  Lab Results   Component Value Date    SEDRATE 56 (H) 06/02/2025     Lab Results   Component Value Date    CRP 1.71 (H) 06/02/2025     Lab Results   Component Value Date    ALT 12 06/01/2025    AST 14 06/01/2025    ALKPHOS 63 06/01/2025    BILITOT 0.3 06/01/2025       Microbiology  5- wound culture MSSA  5- wound culture MSSA    Imaging  5- MRI of right foot shows high likelihood of osteomyelitis involving the first metatarsal head and neck    Assessment/Plan   1.  Continue Ancef to complete a 42-day treatment course  2.  Will put the [facility] orders in  3.  Patient to follow-up with me at the wound center in 3 to 4 weeks    Other issues  Diabetes mellitus puts patient at higher risk for infections  CAD/STEMI status post stent  Hyperlipidemia  Obesity      I reviewed and interpreted all lab test imaging studies and documentations from other healthcare providers  I am monitoring for antibiotic side effects and toxicity     Elsie A  MD [place]

## 2025-06-03 NOTE — PROGRESS NOTES
PODIATRY SERVICE PROGRESS NOTE    SERVICE DATE: 6/3/2025   SERVICE TIME:  09:59 AM    Subjective   INTERVAL HPI:   Patient patient is s/p day 1 right foot surgical wound closure.  Her pain is well controlled   Patient denies any constitutional symptoms.  No other pedal complaints.       Medications:  Scheduled Meds: Scheduled Medications[1]  Continuous Infusions: Continuous Medications[2]  PRN Meds: PRN Medications[3]         Objective   PHYSICAL EXAM:  Physical Exam Performed:  Vitals:    06/03/25 0732   BP: 175/73   Pulse: 74   Resp:    Temp: 35.2 °C (95.4 °F)   SpO2: 91%     Body mass index is 47.63 kg/m².    Patient is AOx3 and in no acute distress. Patient is alert and cooperative. Sitting comfortably in bed with dressing clean, dry and intact. Heel off-loading boots in place B/L.    Vascular:  Palpable DP/PT pulses. Moderate edema noted. Hair growth absent. CFT<5 to hallux. Temperature is warm to cool from tibial tuberosity to distal digits.      Musculoskeletal: Gross active and passive ROM intact to age and activity level. Moves all extremities spontaneously. Mild pain to palpation at MTPJ wound area R.    Neurological: Intact light touch sensation B/L. Pain stimuli diminished B/L.     Dermatologic:  Right foot incision site is healing well.  Sutures are intact no dehiscence noted.  Mild periwound erythema, edema noted.  Moderate serosanguineous drainage noted.  No necrosis, fluctuance, malodor or ascending cellulitis noted.    Wound: Right forefoot            LABS:   Results for orders placed or performed during the hospital encounter of 05/28/25 (from the past 24 hours)   POCT GLUCOSE   Result Value Ref Range    POCT Glucose 128 (H) 74 - 99 mg/dL   CBC and Auto Differential   Result Value Ref Range    WBC 9.1 4.4 - 11.3 x10*3/uL    nRBC 0.0 0.0 - 0.0 /100 WBCs    RBC 4.69 4.00 - 5.20 x10*6/uL    Hemoglobin 12.8 12.0 - 16.0 g/dL    Hematocrit 40.8 36.0 - 46.0 %    MCV 87 80 - 100 fL    MCH 27.3 26.0 -  34.0 pg    MCHC 31.4 (L) 32.0 - 36.0 g/dL    RDW 14.0 11.5 - 14.5 %    Platelets 328 150 - 450 x10*3/uL    Neutrophils % 67.0 40.0 - 80.0 %    Immature Granulocytes %, Automated 1.0 (H) 0.0 - 0.9 %    Lymphocytes % 25.1 13.0 - 44.0 %    Monocytes % 4.9 2.0 - 10.0 %    Eosinophils % 1.6 0.0 - 6.0 %    Basophils % 0.4 0.0 - 2.0 %    Neutrophils Absolute 6.11 1.20 - 7.70 x10*3/uL    Immature Granulocytes Absolute, Automated 0.09 0.00 - 0.70 x10*3/uL    Lymphocytes Absolute 2.29 1.20 - 4.80 x10*3/uL    Monocytes Absolute 0.45 0.10 - 1.00 x10*3/uL    Eosinophils Absolute 0.15 0.00 - 0.70 x10*3/uL    Basophils Absolute 0.04 0.00 - 0.10 x10*3/uL   Sedimentation Rate   Result Value Ref Range    Sedimentation Rate 56 (H) 0 - 20 mm/h   C-reactive protein   Result Value Ref Range    C-Reactive Protein 1.71 (H) <1.00 mg/dL   POCT GLUCOSE   Result Value Ref Range    POCT Glucose 151 (H) 74 - 99 mg/dL   POCT GLUCOSE   Result Value Ref Range    POCT Glucose 163 (H) 74 - 99 mg/dL   CBC   Result Value Ref Range    WBC 9.9 4.4 - 11.3 x10*3/uL    nRBC 0.0 0.0 - 0.0 /100 WBCs    RBC 4.48 4.00 - 5.20 x10*6/uL    Hemoglobin 12.4 12.0 - 16.0 g/dL    Hematocrit 39.4 36.0 - 46.0 %    MCV 88 80 - 100 fL    MCH 27.7 26.0 - 34.0 pg    MCHC 31.5 (L) 32.0 - 36.0 g/dL    RDW 14.1 11.5 - 14.5 %    Platelets 312 150 - 450 x10*3/uL   Basic Metabolic Panel   Result Value Ref Range    Glucose 166 (H) 74 - 99 mg/dL    Sodium 138 136 - 145 mmol/L    Potassium 4.5 3.5 - 5.3 mmol/L    Chloride 105 98 - 107 mmol/L    Bicarbonate 25 21 - 32 mmol/L    Anion Gap 13 10 - 20 mmol/L    Urea Nitrogen 20 6 - 23 mg/dL    Creatinine 1.29 (H) 0.50 - 1.05 mg/dL    eGFR 51 (L) >60 mL/min/1.73m*2    Calcium 9.0 8.6 - 10.3 mg/dL   POCT GLUCOSE   Result Value Ref Range    POCT Glucose 202 (H) 74 - 99 mg/dL      Lab Results   Component Value Date    HGBA1C 8.1 (H) 02/19/2025      Lab Results   Component Value Date    CRP 1.71 (H) 06/02/2025      Lab Results   Component  Value Date    SEDRATE 56 (H) 06/02/2025        Results from last 7 days   Lab Units 06/03/25  0545   WBC AUTO x10*3/uL 9.9   RBC AUTO x10*6/uL 4.48   HEMOGLOBIN g/dL 12.4   HEMATOCRIT % 39.4     Results from last 7 days   Lab Units 06/03/25  0545 06/02/25  0515 06/01/25  0525   SODIUM mmol/L 138   < > 139   POTASSIUM mmol/L 4.5   < > 4.6   CHLORIDE mmol/L 105   < > 104   CO2 mmol/L 25   < > 24   BUN mg/dL 20   < > 22   CREATININE mg/dL 1.29*   < > 1.22*   CALCIUM mg/dL 9.0   < > 8.7   BILIRUBIN TOTAL mg/dL  --   --  0.3   ALT U/L  --   --  12   AST U/L  --   --  14    < > = values in this interval not displayed.           IMAGING REVIEW:  Imaging  XR foot right 3+ views  Result Date: 6/2/2025  Stable chronic findings as above with improved appearance of the forefoot soft tissues and prior noted ulcer.     MACRO: None   Signed by: Jovanny Walters 6/2/2025 9:33 PM Dictation workstation:   [id number]    MR foot right w and wo IV contrast  Result Date: 5/29/2025  Hallux valgus and soft tissue ulcer/cellulitis with high likelihood of osteomyelitis involving the 1st metatarsal head and neck.   Findings of the midfoot suggestive of neuropathic arthropathy.   Subacute to chronic fracture deformities of the 4th and 5th metatarsal shafts.   Additional ancillary findings, as above.   MACRO: None   Signed by: Jose Saxena 5/29/2025 1:41 PM Dictation workstation:   [id number]    XR foot right 3+ views  Result Date: 5/28/2025  Soft tissue swelling of the dorsum of the foot and great toe with ulceration at the level of the 1st MTP joint.   No radiographic evidence of osteomyelitis.  MRI is more sensitive for early changes of osteomyelitis and may be considered if clinically indicated.   Degenerative and posttraumatic changes as above.     MACRO: None   Signed by: Alexa Cline 5/28/2025 11:19 PM Dictation workstation:   [id number]    XR toe right 2+ views  Result Date: 5/28/2025  Soft tissue swelling of the dorsum of the  foot and great toe with ulceration at the level of the 1st MTP joint.   No radiographic evidence of osteomyelitis.  MRI is more sensitive for early changes of osteomyelitis and may be considered if clinically indicated.   Degenerative and posttraumatic changes as above.     MACRO: None   Signed by: Alexa Cline 5/28/2025 11:19 PM Dictation workstation:   [id number]    XR chest 2 views  Result Date: 5/28/2025  No acute cardiopulmonary process.     MACRO: None.   Signed by: Quinton Abdalla 5/28/2025 7:51 PM Dictation workstation:   [id number]      Cardiology, Vascular, and Other Imaging  Bedside PICC Imaging  Result Date: 6/2/2025  These images are not reportable by radiology and will not be interpreted by  Radiologists.              Assessment/Plan   ASSESSMENT & PLAN:    # S/p right foot surgical wound closure DOS 6/2/2025  ##S/P Right Foot Incision and Drainage with Bone Resection  # Non-pressure ulcer of right medial forefoot with necrosis of muscle  # Cellulitis of right foot  # Likely osteomyelitis of right foot 1st metatarsal head  # Septic arthritis of right 1st MTPJ  # Charcot neuroarthropathy of right foot  # Type 2 DM c/b peripheral polyneuropathy    - Patient was seen and evaluated; all findings were discussed and all questions were answered to patient's satisfaction.  - Charts, labs, vitals and imaging all reviewed.   - Imaging: Right foot improved appearance of the forefoot soft tissue and prior noted ulcer.  - Labs: Labs from today WBC 9.9, glucose 166.  CRP 1.71, and ESR 56 from 6/2/25    Plan:  - Abx: Ancef per ID  - Pain Regimen: Primary  - Dressing changed today with Betadine soaked Adaptic, 4 x 4, Kerlix, Ace wrap  - Patient wound is healing well, she can be discharged from podiatry standpoint.  - Patient to follow-up in outpatient clinic on 6/6/2025 with Dr. Meek BURK.  - Podiatry will continue to follow while in house.    DVT ppx: Per primary  Weightbearing: WBAT in postop  shoe      Case discussed with attending Dr. Meek BURK.      Chong Londono DPM PGY-1  Podiatric Medicine and Surgery   Epic Secure Chat            SIGNATURE: Chong Londono DPM PATIENT NAME: Patricia Gibbs   DATE: Zeina 3, 2025 MRN: [id number]   TIME: 12:00 PM CONTACT: Haiku Michele           [1] benzonatate, 100 mg, oral, TID  ceFAZolin, 1 g, intravenous, q8h  cetirizine, 10 mg, oral, q AM  dapagliflozin propanediol, 5 mg, oral, Daily  icosapent ethyL, 2 g, oral, BID  insulin lispro, 0-10 Units, subcutaneous, TID AC  lidocaine, 5 mL, infiltration, Once  lisinopril, 10 mg, oral, Daily  metoprolol succinate XL, 25 mg, oral, Nightly  pantoprazole, 40 mg, oral, Daily  [2]    [3] PRN medications: acetaminophen, albuterol, alteplase, dextromethorphan-guaifenesin, dextrose, dextrose, glucagon, glucagon, hydrOXYzine HCL, morphine **OR** morphine, oxyCODONE, polyethylene glycol

## 2025-06-03 NOTE — CARE PLAN
The patient's goals for the shift include  pain control    The clinical goals for the shift include pain control and rest    Pt awake in bed. C/o pain and medicated per Emar. No acute distress noted. Will continue to monitor

## 2025-06-04 ENCOUNTER — HOME CARE VISIT (OUTPATIENT)
Dept: HOME HEALTH SERVICES | Facility: HOME HEALTH | Age: 50
End: 2025-06-04
Payer: COMMERCIAL

## 2025-06-04 ENCOUNTER — OFFICE VISIT (OUTPATIENT)
Dept: PRIMARY CARE | Facility: CLINIC | Age: 50
End: 2025-06-04
Payer: COMMERCIAL

## 2025-06-04 ENCOUNTER — PATIENT OUTREACH (OUTPATIENT)
Dept: PRIMARY CARE | Facility: CLINIC | Age: 50
End: 2025-06-04

## 2025-06-04 VITALS
BODY MASS INDEX: 47.46 KG/M2 | WEIGHT: 274.6 LBS | OXYGEN SATURATION: 99 % | TEMPERATURE: 96.9 F | HEART RATE: 90 BPM | SYSTOLIC BLOOD PRESSURE: 111 MMHG | DIASTOLIC BLOOD PRESSURE: 68 MMHG

## 2025-06-04 VITALS
HEART RATE: 84 BPM | TEMPERATURE: 98.6 F | SYSTOLIC BLOOD PRESSURE: 110 MMHG | DIASTOLIC BLOOD PRESSURE: 62 MMHG | RESPIRATION RATE: 18 BRPM | OXYGEN SATURATION: 99 %

## 2025-06-04 DIAGNOSIS — Z79.4 CONTROLLED TYPE 2 DIABETES MELLITUS WITHOUT COMPLICATION, WITH LONG-TERM CURRENT USE OF INSULIN: ICD-10-CM

## 2025-06-04 DIAGNOSIS — Z09 HOSPITAL DISCHARGE FOLLOW-UP: Primary | ICD-10-CM

## 2025-06-04 DIAGNOSIS — E11.9 CONTROLLED TYPE 2 DIABETES MELLITUS WITHOUT COMPLICATION, WITH LONG-TERM CURRENT USE OF INSULIN: ICD-10-CM

## 2025-06-04 DIAGNOSIS — N95.1 PERI-MENOPAUSAL: ICD-10-CM

## 2025-06-04 DIAGNOSIS — M86.9 OSTEOMYELITIS OF FIFTH TOE OF RIGHT FOOT (MULTI): ICD-10-CM

## 2025-06-04 LAB — POC HEMOGLOBIN A1C: 8.8 % (ref 4.2–6.5)

## 2025-06-04 PROCEDURE — 3078F DIAST BP <80 MM HG: CPT | Performed by: NURSE PRACTITIONER

## 2025-06-04 PROCEDURE — 99212 OFFICE O/P EST SF 10 MIN: CPT | Performed by: NURSE PRACTITIONER

## 2025-06-04 PROCEDURE — 1036F TOBACCO NON-USER: CPT | Performed by: NURSE PRACTITIONER

## 2025-06-04 PROCEDURE — 4010F ACE/ARB THERAPY RXD/TAKEN: CPT | Performed by: NURSE PRACTITIONER

## 2025-06-04 PROCEDURE — 3052F HG A1C>EQUAL 8.0%<EQUAL 9.0%: CPT | Performed by: NURSE PRACTITIONER

## 2025-06-04 PROCEDURE — 83036 HEMOGLOBIN GLYCOSYLATED A1C: CPT | Performed by: NURSE PRACTITIONER

## 2025-06-04 PROCEDURE — G0299 HHS/HOSPICE OF RN EA 15 MIN: HCPCS

## 2025-06-04 PROCEDURE — 3074F SYST BP LT 130 MM HG: CPT | Performed by: NURSE PRACTITIONER

## 2025-06-04 PROCEDURE — 99496 TRANSJ CARE MGMT HIGH F2F 7D: CPT | Performed by: NURSE PRACTITIONER

## 2025-06-04 RX ADMIN — SODIUM CHLORIDE 30 ML: 9 INJECTION, SOLUTION INTRAMUSCULAR; INTRAVENOUS; SUBCUTANEOUS at 13:00

## 2025-06-04 RX ADMIN — CEFAZOLIN 1 G: 1 INJECTION, POWDER, FOR SOLUTION INTRAVENOUS at 13:06

## 2025-06-04 RX ADMIN — Medication 5 ML: at 13:15

## 2025-06-04 ASSESSMENT — ENCOUNTER SYMPTOMS
COUGH CHARACTERISTICS: PRODUCTIVE
LAST BOWEL MOVEMENT: 67360
SHORTNESS OF BREATH: 1
PAIN: 1
LOWEST PAIN SEVERITY IN PAST 24 HOURS: 4/10
APPETITE LEVEL: POOR
PERSON REPORTING PAIN: PATIENT
DYSPNEA ACTIVITY LEVEL: AFTER AMBULATING MORE THAN 20 FT
HIGHEST PAIN SEVERITY IN PAST 24 HOURS: 8/10
DEPRESSION: 0
COUGH: 1

## 2025-06-04 ASSESSMENT — LIFESTYLE VARIABLES: SMOKING_STATUS: 0

## 2025-06-04 ASSESSMENT — ACTIVITIES OF DAILY LIVING (ADL)
ENTERING_EXITING_HOME: DEPENDENT
OASIS_M1830: 03

## 2025-06-04 ASSESSMENT — PAIN SCALES - GENERAL: PAINLEVEL_OUTOF10: 0-NO PAIN

## 2025-06-04 NOTE — PROGRESS NOTES
"Patient: Patricia Gibbs  : 1975  PCP: Lashay Romo, APRN-CNP  MRN: [id number]  Program: Transitional Care Management  Status: Enrolled  Effective Dates: 2025 - present  Responsible Staff: Barron Brand LPN  Social Drivers to be Addressed: Physical Activity, Social Connections, Stress, Tobacco Use         Patricia Gibbs is a 49 y.o. female presenting today for follow-up after being discharged from the hospital 1 days ago.   At last visit treated presented with sinus c/o, referred to Wound Clinic, reports her right foot wound worsened significantly overnight, called clinic to get in early and was sent to the ED    The main problem requiring admission was Osteomyelitis, Cellulitis. She is POD #4 right foot I&D with bone, POD #1  right foot surgical wound closure with Dr Eden. Currently following OP with ID Dr Gabriel on 42 day course of Ancef tentative through July 10, 2025 , weekly labs in place, HHC coming to day to teach her how to self administer IV antibiotics. Has left upper ext PICC.  Endorses PICC line itchy at op site, has latex allergy, Atarax prn which she has not yet taken, educated. Site is Clean, dry, intact without signs of infection or irritation    DM2- HGA1C today 8.8, has been taking Mounjaro with good management however no longer seems effective. Will readdress at next appointment. Continue Diabetic diet    Skye-menopause- wondering if she is currently. Notes hot flashes, every few days, \"head sweats\". Has IUD, planning on removal. Handouts provided and reviewed with patient, discussed     The discharge summary and/or Transitional Care Management documentation was reviewed. Medication reconciliation was performed as indicated via the \"Jorden as Reviewed\" timestamp.     Patricia Gibbs was contacted by Transitional Care Management services two days after her discharge. This encounter and supporting documentation was reviewed.    Review of Systems  Review of " Systems   Constitutional: Negative.    HENT: Negative.     Respiratory: Negative.     Cardiovascular: Negative.    Gastrointestinal: Negative.    Genitourinary: Negative.    Musculoskeletal: HPI  Psychiatric/Behavioral: Negative.     All other systems reviewed and are negative.    /68 (BP Location: Right arm, Patient Position: Sitting)   Pulse 90   Temp 36.1 °C (96.9 °F) (Temporal)   Wt 125 kg (274 lb 9.6 oz)   SpO2 99%   BMI 47.46 kg/m²     Physical Exam  Vitals reviewed.   Constitutional:       Appearance: Normal appearance.   HENT:      Head: Normocephalic and atraumatic.      Right Ear: Tympanic membrane and ear canal normal.      Left Ear: Ear canal normal.      Nose: No rhinorrhea.      Mouth/Throat:      Pharynx: Posterior oropharyngeal erythema present.   Eyes:      Conjunctiva/sclera: Conjunctivae normal.   Cardiovascular:      Rate and Rhythm: Normal rate and regular rhythm.   Pulmonary:      Effort: Pulmonary effort is normal. No respiratory distress.      Breath sounds: Rhonchi present. No wheezing or rales.   Musculoskeletal:      Cervical back: Normal range of motion and neck supple.   Skin:     Comments: Left upper arm PICC, c/d/I  Right foot surgical dressing/shoe   Neurological:      General: No focal deficit present.      Mental Status: She is alert.   Psychiatric:         Mood and Affect: Mood normal.         The complexity of medical decision making for this patient's transitional care is high.    Assessment/Plan   Problem List Items Addressed This Visit           ICD-10-CM    Controlled type 2 diabetes mellitus without complication E11.9    Relevant Orders    POCT glycosylated hemoglobin (Hb A1C) manually resulted (Completed)    Skye-menopausal N95.1    Hospital discharge follow-up - Primary Z09   [facility]  Labs in place, Podiatry follow up scheduled   ID following, Dr Gabriel

## 2025-06-04 NOTE — PATIENT INSTRUCTIONS
Menopause is the time that marks the end of your menstrual cycles. It's diagnosed after you've gone 12 months without a menstrual period. Menopause can happen in your 40s or 50s, but the average age is 51 in the [place].  Menopause is a natural biological process. But the physical symptoms, such as hot flashes, and emotional symptoms of menopause may disrupt your sleep, lower your energy or affect emotional health. There are many effective treatments available, from lifestyle adjustments to hormone therapy.    Symptoms  In the months or years leading up to menopause (perimenopause), you might experience these signs and symptoms:  Irregular periods  Vaginal dryness  Hot flashes  Chills  Night sweats  Sleep problems  Mood changes  Weight gain and slowed metabolism  Thinning hair and dry skin  Loss of breast fullness    Signs and symptoms, including changes in menstruation can vary among women. Most likely, you'll experience some irregularity in your periods before they end.  Skipping periods during perimenopause is common and expected. Often, menstrual periods will skip a month and return, or skip several months and then start monthly cycles again for a few months. Periods also tend to happen on shorter cycles, so they are closer together. Despite irregular periods, pregnancy is possible. If you've skipped a period but aren't sure you've started the menopausal transition, consider a pregnancy test.    Treatment focuses on symptom management and can include   *Hormone therapy.   Estrogen therapy is the most effective treatment option for relieving menopausal hot flashes. Depending on your personal and family medical history, your doctor may recommend estrogen in the lowest dose and the shortest time frame needed to provide symptom relief for you. If you still have your uterus, you'll need progestin in addition to estrogen. Estrogen also helps prevent bone loss. Long-term use of hormone therapy may have some  cardiovascular and breast cancer risks, but starting hormones around the time of menopause has shown benefits for some women. Talk to your doctor about the benefits and risks of hormone therapy and whether it's a safe choice for you.  *Vaginal estrogen.   To relieve vaginal dryness, estrogen can be administered directly to the vagina using a vaginal cream, tablet or ring. This treatment releases just a small amount of estrogen, which is absorbed by the vaginal tissues. It can help relieve vaginal dryness, discomfort with intercourse and some urinary symptoms.  Low-dose antidepressants. Certain antidepressants related to the class of drugs called selective serotonin reuptake inhibitors (SSRIs) may decrease menopausal hot flashes. A low-dose antidepressant for management of hot flashes may be useful for women who can't take estrogen for health reasons or for women who need an antidepressant for a mood disorder.  *Gabapentin   Gabapentin is approved to treat seizures, but it has also been shown to help reduce hot flashes. This drug is useful in women who can't use estrogen therapy and in those who also have nighttime hot flashes.  *Medications to prevent or treat osteoporosis. Depending on individual needs, doctors may recommend medication to prevent or treat osteoporosis. Several medications are available that help reduce bone loss and risk of fractures. Your doctor might prescribe vitamin D supplements to help strengthen bone

## 2025-06-04 NOTE — HOME HEALTH
pt seen for iv soc. pt had a bunion that got infected. was seen in ED after being seen in [facility]. was found to have osteomyelitis. is now on iv abx q8h via ivp. pt was instructed on SASH technique and infection prevention. pt administered ivp independently with reminders from RN. pt feels comfortable enough to complete infusions on her own, denies the need for a re-teach. pt is aware that she is to do the medication at the same times everyday. labs will be drawn next week. pt is seeing podiatrist next week as well. dressing on foot will not be removed before appt. incision unobserved. pt is wearing surgical boot. ambulates independently. sandy had a fall in over 6 mos, claims she is clumsy. does have pain in the foot that gets up to an 8. is treating pain w oxy as of now. is using miralax to help pass bowels. was constipated in hospital. passed small bm this AM. appetite is poor. follows a diabetic diet. treats DM type 2 w metformin, farxiga and ozempic. pt is independent w giving herself injections. handles all her other meds independently. fills medication box for 3 weeks at a time. meds and allergies reviewed and updated as needed with pt, no issues found. vitals wnl. pt denies the need for PT, OT, and aide. will be seen by nursing weekly for picc care and labs. will do wound care as needed.

## 2025-06-04 NOTE — PROGRESS NOTES
Discharge Facility: [facility]  Discharge Diagnosis: Cellulitis   Admission Date: 5/29/25  Discharge Date: 6/3/25    PCP Appointment Date: 6/4/25  Specialist Appointment Date: Unknown  Hospital Encounter and Summary Linked: Yes    Discharge Summary by Cecil Ibanez DO (06/03/2025 15:39)     Pt has an appointment within 2 days of discharge.    Barron Brand LPN

## 2025-06-04 NOTE — DISCHARGE SUMMARY
Discharge Diagnosis  Cellulitis           Issues Requiring Follow-Up  OM    Discharge Meds     Medication List      START taking these medications     benzonatate 100 mg capsule; Commonly known as: Tessalon; Take 1 capsule   (100 mg) by mouth 3 times a day. Do not crush or chew.   ceFAZolin 1 gram injection; Commonly known as: Ancef; Infuse 1,000 mg (1   g) into a venous catheter every 8 hours. Obtain weekly labs: BMP, CBC, Sed   rate, and CRP. Fax to Dr. Gabriel at [phone].   dextromethorphan-guaifenesin  mg 12 hr tablet; Commonly known as:   Mucinex DM; Take 1 tablet by mouth 2 times a day as needed for cough. Do   not crush, chew, or split.   hydrOXYzine HCL 10 mg tablet; Commonly known as: Atarax; Take 1 tablet   (10 mg) by mouth every 4 hours if needed for itching.   oxyCODONE 5 mg immediate release tablet; Commonly known as: Roxicodone;   Take 1 tablet (5 mg) by mouth every 4 hours if needed for severe pain (7 -   10).   polyethylene glycol 17 gram packet; Commonly known as: Glycolax,   Miralax; Take 17 g by mouth once daily.     CONTINUE taking these medications     * albuterol 90 mcg/actuation inhaler; Commonly known as: ProAir HFA;   Inhale 2 puffs every 6 hours if needed for wheezing or shortness of   breath.   * albuterol 90 mcg/actuation inhaler; Commonly known as: Ventolin HFA;   Inhale 2 puffs every 4 hours if needed for wheezing or shortness of   breath.   aspirin 81 mg EC tablet   cetirizine 10 mg tablet; Commonly known as: ZyrTEC   clopidogrel 75 mg tablet; Commonly known as: Plavix   dapagliflozin propanediol 5 mg tablet; Commonly known as: Farxiga; Take   1 tablet (5 mg) by mouth once daily.   icosapent ethyL 1 gram capsule; Commonly known as: Vascepa; Take 2   capsules (2 g) by mouth 2 times daily (morning and late afternoon).   lisinopril 10 mg tablet   metFORMIN  mg 24 hr tablet; Commonly known as: Glucophage-XR; Take   2 tablets (1,000 mg) by mouth 2 times a day.    metoprolol succinate XL 25 mg 24 hr tablet; Commonly known as: Toprol-XL   Mirena 20 mcg/24hr IUD; Generic drug: levonorgestrel   multivitamin tablet   Ozempic 0.25 mg or 0.5 mg (2 mg/3 mL) pen injector; Generic drug:   semaglutide; INJECT 0.5 MG UNDER THE SKIN 1 TIME PER WEEK.   pantoprazole 40 mg EC tablet; Commonly known as: ProtoNix; TAKE 1 TABLET   (40 MG) BY MOUTH DAILY DO NOT CRUSH, CHEW, ORSPLIT   Repatha Syringe 140 mg/mL injection; Generic drug: evolocumab  * This list has 2 medication(s) that are the same as other medications   prescribed for you. Read the directions carefully, and ask your doctor or   other care provider to review them with you.     STOP taking these medications     amoxicillin-clavulanate 875-125 mg tablet; Commonly known as: Augmentin   brompheniramine-pseudoeph-DM 2-30-10 mg/5 mL syrup   carbamide peroxide 6.5 % otic solution; Commonly known as: Debrox   predniSONE 20 mg tablet; Commonly known as: Deltasone       Test Results Pending At Discharge  Pending Labs       Order Current Status    Surgical Pathology Exam In process            Hospital Course   49 year old female who presented to ED today with infected right foot. Podiatry consulted. Concern for osteomyelitis. Continue antibiotics. Wound culture ordered. Patient to be admitted to hospital for further medical management.     Cellulitis of right great toe  Diabetic ulcer  SAMANTHA  Elevated inflammatory markers  Leukocytosis  Severe Charcot deformity of right foot  Admit to inpatient/telemetry to Dr. Cecil Ibanez  Podiatry consult and appreciate recs  See imaging results  Wound culture obtained in podiatry office  Follow blood cultures  Continue Zosyn and vancomycin  SSI with hypoglycemic protocol  Repeat labs in AM     Chronic conditions  #CAD, HTN, HLD, lung nodule  Continue home meds as appropriate when nursing completes home med rec.         Full code     DVT prophylaxis  Lovenox SQ  SCD's, as tolerated     5/30: MRI showed  high likelihood of OM of right great toe. Patient underwent I&D with bone resection of right great toe. Continue IV antibiotics for now. Will discuss antibiotic regimen for discharge with podiatry.      6/1: Patient will need 6 weeks of IV antibiotics per ID. Patient is hoping to do this from home and to work from home during this time. She will likely get a PICC tomorrow. She will go back to the OR tomorrow for delayed closure as well.     6/2: Wound culture is growing MSSA. PICC line placed today for IV antibiotics. Creatinine stable at 1.2, this might be her new baseline from progression of CKD. Patient to go for delayed closure today. Possible discharge tomorrow.     6/3: Discharged home with home health care to continue Ancef to complete a 42-day treatment course     Pertinent Physical Exam At Time of Discharge  Physical Exam  Constitutional:       Appearance: Normal appearance. She is obese.   HENT:      Head: Normocephalic and atraumatic.      Nose: Nose normal.      Mouth/Throat:      Mouth: Mucous membranes are moist.      Pharynx: Oropharynx is clear.   Eyes:      Extraocular Movements: Extraocular movements intact.      Pupils: Pupils are equal, round, and reactive to light.   Cardiovascular:      Rate and Rhythm: Normal rate and regular rhythm.   Pulmonary:      Effort: No respiratory distress.      Breath sounds: Normal breath sounds. No wheezing, rhonchi or rales.   Abdominal:      General: Bowel sounds are normal. There is no distension.      Palpations: Abdomen is soft.      Tenderness: There is no abdominal tenderness. There is no guarding.   Musculoskeletal:      Right lower leg: No edema.      Left lower leg: No edema.   Skin:     General: Skin is warm and dry.   Neurological:      Mental Status: She is alert and oriented to person, place, and time. Mental status is at baseline.   Psychiatric:         Mood and Affect: Mood normal.         Behavior: Behavior normal.         Outpatient  Follow-Up  Future Appointments   Date Time Provider Department Center   6/4/2025  9:30 AM Lashay Romo, APRN-CNP [id number] [place]   6/4/2025  1:00 PM Kellen Brooks RN [facility]   6/5/2025 12:30 PM Ahsan Hou, PT [facility]   6/6/2025 To Be Determined Marilyn Peterson, JOSE [facility]   11/4/2025 10:00 AM Elizabeth Vizcarra DPM [id number] [place]         Cecil Ibanez, DO

## 2025-06-05 ENCOUNTER — HOME CARE VISIT (OUTPATIENT)
Dept: HOME HEALTH SERVICES | Facility: HOME HEALTH | Age: 50
End: 2025-06-05
Payer: COMMERCIAL

## 2025-06-06 ENCOUNTER — APPOINTMENT (OUTPATIENT)
Dept: PRIMARY CARE | Facility: CLINIC | Age: 50
End: 2025-06-06
Payer: COMMERCIAL

## 2025-06-06 ENCOUNTER — PATIENT OUTREACH (OUTPATIENT)
Dept: PRIMARY CARE | Facility: CLINIC | Age: 50
End: 2025-06-06

## 2025-06-06 NOTE — PROGRESS NOTES
Confirmation of at least 2 patient identifiers.    Completed telephonic follow-up with patient after recent visit with SEBASTIEN Olivas      Spoke to patient during outreach call.    Patient reports feeling: Improved    Patient has questions or concerns about medications: No    Have all prescribed medications been filled? Yes    Patient has necessary resources to manage their care? Yes    Patient has questions or concerns? No    Next care management follow-up approximately within one month.  Care  information provided to patient.    Barron Brand LPN

## 2025-06-08 ENCOUNTER — HOME CARE VISIT (OUTPATIENT)
Dept: HOME HEALTH SERVICES | Facility: HOME HEALTH | Age: 50
End: 2025-06-08
Payer: COMMERCIAL

## 2025-06-08 VITALS
DIASTOLIC BLOOD PRESSURE: 78 MMHG | RESPIRATION RATE: 20 BRPM | TEMPERATURE: 98.7 F | SYSTOLIC BLOOD PRESSURE: 122 MMHG | OXYGEN SATURATION: 97 % | HEART RATE: 68 BPM

## 2025-06-08 PROCEDURE — G0299 HHS/HOSPICE OF RN EA 15 MIN: HCPCS

## 2025-06-08 RX ADMIN — SODIUM CHLORIDE 10 ML: 9 INJECTION, SOLUTION INTRAMUSCULAR; INTRAVENOUS; SUBCUTANEOUS at 09:45

## 2025-06-08 ASSESSMENT — ENCOUNTER SYMPTOMS
LOWEST PAIN SEVERITY IN PAST 24 HOURS: 2/10
CHANGE IN APPETITE: INCREASED
DIARRHEA: 1
HIGHEST PAIN SEVERITY IN PAST 24 HOURS: 8/10
LAST BOWEL MOVEMENT: 67364
PAIN: 1
APPETITE LEVEL: FAIR
PERSON REPORTING PAIN: PATIENT

## 2025-06-08 NOTE — HOME HEALTH
pt seen for picc dressing change. picc line dressing is soiled with blood from insertion site. md informed. old picc line dressing removed and replaced according to protocol. cap and extension will be replaced on wednesday. will switch to sensitive skin dressing on wednesday as well due to itching. no issues noted w infusions. pt saw podiatrist on friday and is now doing daily wound care independently. denies the need for rn to do wound care, will do it herself. last completed yesterday. does have pain that goes up to an 8. has taken 2 oxys since being home. denies any falls. has been having some diarrhea. appettie increased slightly, regular diet. no sob noted. no changes in meds noted. vitals wnl. will return on wednesday for picc dressing and labs.

## 2025-06-08 NOTE — Clinical Note
I have reviewed discharge instructions with the patient. The patient verbalized understanding. Patient left ED via Discharge Method: ambulatory to Home with family. Opportunity for questions and clarification provided. Patient given 2 scripts. To continue your aftercare when you leave the hospital, you may receive an automated call from our care team to check in on how you are doing. This is a free service and part of our promise to provide the best care and service to meet your aftercare needs.  If you have questions, or wish to unsubscribe from this service please call [phone]. Thank you for Choosing our [facility] Emergency Department. pt seen for picc line dressing change. biopatch was soiled w blood. will return wednesday to reassess area and change dressing once again.

## 2025-06-09 ENCOUNTER — HOME INFUSION (OUTPATIENT)
Dept: INFUSION THERAPY | Age: 50
End: 2025-06-09
Payer: COMMERCIAL

## 2025-06-09 ENCOUNTER — DOCUMENTATION (OUTPATIENT)
Dept: INFUSION THERAPY | Age: 50
End: 2025-06-09
Payer: COMMERCIAL

## 2025-06-09 NOTE — PROGRESS NOTES
Patricia Gibbs is receiving cefazolin for DM foot wound thru 7/10     Followed by Dr Gabriel    No new labs to review.    RN notes from 6/8 indicate pt is independent with infusions. She is itchy from tegaderm so RN is requesting opsites to be sent with this week's delivery.     RX contacted pt, confirmed doses in home thru 6/10. She is agreeable to delivery that day for another week of meds and supplies/flushes to match plus opsites. No questions for Rph at this time.    Mixing 6/9 and dispensing 6/10 with supplies to match   21x cefazolin syringe  DOS 6/11-6/17    Follow up 6/16 check labs, send OVN

## 2025-06-09 NOTE — PROGRESS NOTES
Delivery of the infusion medication and all supplies, including standard amount of flushes and dressing, is scheduled for tomorrow evening, Tuesday  6/10/25. Sending Opsite 4x5 w/dressing supplies  - per request.

## 2025-06-11 ENCOUNTER — OFFICE VISIT (OUTPATIENT)
Dept: WOUND CARE | Facility: CLINIC | Age: 50
End: 2025-06-11
Payer: COMMERCIAL

## 2025-06-11 ENCOUNTER — HOME CARE VISIT (OUTPATIENT)
Dept: HOME HEALTH SERVICES | Facility: HOME HEALTH | Age: 50
End: 2025-06-11
Payer: COMMERCIAL

## 2025-06-11 VITALS — HEART RATE: 65 BPM | TEMPERATURE: 98 F | OXYGEN SATURATION: 100 %

## 2025-06-11 DIAGNOSIS — L97.512 DIABETIC ULCER OF TOE OF RIGHT FOOT ASSOCIATED WITH TYPE 2 DIABETES MELLITUS, WITH FAT LAYER EXPOSED: Primary | ICD-10-CM

## 2025-06-11 DIAGNOSIS — E11.621 DIABETIC ULCER OF TOE OF RIGHT FOOT ASSOCIATED WITH TYPE 2 DIABETES MELLITUS, WITH FAT LAYER EXPOSED: Primary | ICD-10-CM

## 2025-06-11 DIAGNOSIS — L03.031 CELLULITIS OF GREAT TOE OF RIGHT FOOT: ICD-10-CM

## 2025-06-11 PROCEDURE — 99213 OFFICE O/P EST LOW 20 MIN: CPT

## 2025-06-11 PROCEDURE — G0299 HHS/HOSPICE OF RN EA 15 MIN: HCPCS

## 2025-06-11 RX ADMIN — SODIUM CHLORIDE 30 ML: 9 INJECTION, SOLUTION INTRAMUSCULAR; INTRAVENOUS; SUBCUTANEOUS at 08:15

## 2025-06-11 RX ADMIN — Medication 5 ML: at 08:15

## 2025-06-11 ASSESSMENT — ENCOUNTER SYMPTOMS
LOWEST PAIN SEVERITY IN PAST 24 HOURS: 3/10
PAIN: 1
APPETITE LEVEL: GOOD
LAST BOWEL MOVEMENT: 67367
HIGHEST PAIN SEVERITY IN PAST 24 HOURS: 4/10
PERSON REPORTING PAIN: PATIENT

## 2025-06-11 NOTE — HOME HEALTH
pt seen for routine visit. no issues w infusions noted. bloody drainage present on biopatch once again. will inform md. old picc line dressing removed and replaced according to protocol. cap and extension also replaced. due to be changed again in one week unless needed sooner. picc is flushing and giving blood return. labs drawn according to md order. labs dropped off at [place] [facility]. wound care completed according to md order. pt tolerated well. pictures and measurements attatched in chart. photo sent to podiatrist of wound. podiatrist is encouraging more rest and elevation. wishes to see pt on friday. she does have an appt w him next wednesday at [facility]. pt does have pain, rates it a 4 as of now. does get higher when foot throbs. appetite is good. eats before infusions to avoid upset stomach. was having diarrhea but has subsided. no falls reported. no changes in meds. vitals wnl, unable to obtain bp. no other concerns at this time. davin return in one week for picc care and labs.

## 2025-06-11 NOTE — Clinical Note
picc line insertion site is draining blood. [employer] has had blood on it twice. encouraged pt to call your office.

## 2025-06-12 LAB
ANION GAP SERPL CALCULATED.4IONS-SCNC: 12 MMOL/L (CALC) (ref 7–17)
BUN SERPL-MCNC: 27 MG/DL (ref 7–25)
BUN/CREAT SERPL: 21 (CALC) (ref 6–22)
CALCIUM SERPL-MCNC: 9 MG/DL (ref 8.6–10.2)
CHLORIDE SERPL-SCNC: 105 MMOL/L (ref 98–110)
CO2 SERPL-SCNC: 19 MMOL/L (ref 20–32)
CREAT SERPL-MCNC: 1.26 MG/DL (ref 0.5–0.99)
CRP SERPL-MCNC: 3.6 MG/L
EGFRCR SERPLBLD CKD-EPI 2021: 52 ML/MIN/1.73M2
ERYTHROCYTE [DISTWIDTH] IN BLOOD BY AUTOMATED COUNT: 14.3 % (ref 11–15)
ERYTHROCYTE [SEDIMENTATION RATE] IN BLOOD BY WESTERGREN METHOD: 9 MM/H
GLUCOSE SERPL-MCNC: 249 MG/DL (ref 65–99)
HCT VFR BLD AUTO: 41.5 % (ref 35–45)
HGB BLD-MCNC: 13 G/DL (ref 11.7–15.5)
LABORATORY COMMENT REPORT: NORMAL
MCH RBC QN AUTO: 27.7 PG (ref 27–33)
MCHC RBC AUTO-ENTMCNC: 31.3 G/DL (ref 32–36)
MCV RBC AUTO: 88.3 FL (ref 80–100)
PATH REPORT.FINAL DX SPEC: NORMAL
PATH REPORT.GROSS SPEC: NORMAL
PATH REPORT.RELEVANT HX SPEC: NORMAL
PATH REPORT.TOTAL CANCER: NORMAL
PLATELET # BLD AUTO: 321 THOUSAND/UL (ref 140–400)
PMV BLD REES-ECKER: 10.4 FL (ref 7.5–12.5)
POTASSIUM SERPL-SCNC: 5 MMOL/L (ref 3.5–5.3)
RBC # BLD AUTO: 4.7 MILLION/UL (ref 3.8–5.1)
SODIUM SERPL-SCNC: 136 MMOL/L (ref 135–146)
WBC # BLD AUTO: 7.1 THOUSAND/UL (ref 3.8–10.8)

## 2025-06-15 LAB
BACTERIA SPEC AEROBE CULT: ABNORMAL
BACTERIA SPEC ANAEROBE CULT: ABNORMAL

## 2025-06-17 ENCOUNTER — DOCUMENTATION (OUTPATIENT)
Dept: INFUSION THERAPY | Age: 50
End: 2025-06-17
Payer: COMMERCIAL

## 2025-06-17 ENCOUNTER — HOME INFUSION (OUTPATIENT)
Dept: INFUSION THERAPY | Age: 50
End: 2025-06-17
Payer: COMMERCIAL

## 2025-06-17 ENCOUNTER — HOME CARE VISIT (OUTPATIENT)
Dept: HOME HEALTH SERVICES | Facility: HOME HEALTH | Age: 50
End: 2025-06-17
Payer: COMMERCIAL

## 2025-06-17 LAB
BACTERIA SPEC AEROBE CULT: ABNORMAL
BACTERIA SPEC ANAEROBE CULT: ABNORMAL

## 2025-06-17 PROCEDURE — G0299 HHS/HOSPICE OF RN EA 15 MIN: HCPCS

## 2025-06-17 RX ADMIN — SODIUM CHLORIDE 30 ML: 9 INJECTION, SOLUTION INTRAMUSCULAR; INTRAVENOUS; SUBCUTANEOUS at 14:00

## 2025-06-17 ASSESSMENT — ENCOUNTER SYMPTOMS
PAIN: 1
HIGHEST PAIN SEVERITY IN PAST 24 HOURS: 4/10
APPETITE LEVEL: GOOD
LOWEST PAIN SEVERITY IN PAST 24 HOURS: 3/10
PERSON REPORTING PAIN: PATIENT

## 2025-06-17 NOTE — HOME HEALTH
pt seen for routine visit. no issues w infusions noted. pt wishes to return to normal picc line dressing due to continued itchiness and being uncomfortable w new dressing. old picc line dressing removed and replaced according to protocol. cap and extension also replaced. due to be changed again in one week unless needed sooner. picc is flushing and giving blood return. labs drawn according to md order. labs dropped off at [place] [facility]. pain in the foot is still worse in the evenings than in the mornings. denies any falls. ambulates independently. no sob. lungs are clear. no issues passing bowels. denies nausea and diarrhea. no issues noted w urination. continues to do wound care daily. denies the need for wound care today. vitals wnl. new topical abx added to med list. pt is using bactroban once daily on her foot.

## 2025-06-17 NOTE — PROGRESS NOTES
Reviewed chartDevorahjaden Gibbs is receiving cefazolin for DM foot wound thru 7/10     Followed by Dr Gabriel     Labs from 06/11 reviewed and notable for slightly higher creatinine and BUN     She is itchy from tegaderm so RN is requesting opsites to be sent with this week's delivery.     RX contacted pt, confirmed doses in home thru 6/17. She is agreeable to delivery that day by 9pm for another week of meds and supplies/flushes to match plus opsites and standard tape. The hypoallergenic tape was actually worse regarding itchiness.. All questions for Rph answered to satisfaction.     Mixing 6/17 and dispensing 6/17 with supplies to match   21x cefazolin syringe  DOS 6/18-6/24     Follow up 6/23 check labs, send cefazolin OVN

## 2025-06-17 NOTE — PROGRESS NOTES
Delivery of the infusion medication and all supplies, including standard amount of flushes and dressing, is scheduled for today, Tuesday 6/17/25. Sending regular tape and Opsite 4x5 w/dressing supplies  - per request.

## 2025-06-18 ENCOUNTER — APPOINTMENT (OUTPATIENT)
Dept: WOUND CARE | Facility: CLINIC | Age: 50
End: 2025-06-18
Payer: COMMERCIAL

## 2025-06-18 ENCOUNTER — OFFICE VISIT (OUTPATIENT)
Dept: WOUND CARE | Facility: CLINIC | Age: 50
End: 2025-06-18
Payer: COMMERCIAL

## 2025-06-18 DIAGNOSIS — B37.9 CANDIDIASIS: Primary | ICD-10-CM

## 2025-06-18 LAB
ANION GAP SERPL CALCULATED.4IONS-SCNC: 13 MMOL/L (CALC) (ref 7–17)
BUN SERPL-MCNC: 30 MG/DL (ref 7–25)
BUN/CREAT SERPL: 28 (CALC) (ref 6–22)
CALCIUM SERPL-MCNC: 9.5 MG/DL (ref 8.6–10.2)
CHLORIDE SERPL-SCNC: 106 MMOL/L (ref 98–110)
CO2 SERPL-SCNC: 18 MMOL/L (ref 20–32)
CREAT SERPL-MCNC: 1.09 MG/DL (ref 0.5–0.99)
CRP SERPL-MCNC: 3.3 MG/L
EGFRCR SERPLBLD CKD-EPI 2021: 62 ML/MIN/1.73M2
ERYTHROCYTE [DISTWIDTH] IN BLOOD BY AUTOMATED COUNT: 14.6 % (ref 11–15)
ERYTHROCYTE [SEDIMENTATION RATE] IN BLOOD BY WESTERGREN METHOD: 6 MM/H
GLUCOSE SERPL-MCNC: 159 MG/DL (ref 65–99)
HCT VFR BLD AUTO: 41.1 % (ref 35–45)
HGB BLD-MCNC: 13 G/DL (ref 11.7–15.5)
MCH RBC QN AUTO: 27.2 PG (ref 27–33)
MCHC RBC AUTO-ENTMCNC: 31.6 G/DL (ref 32–36)
MCV RBC AUTO: 86 FL (ref 80–100)
PLATELET # BLD AUTO: 320 THOUSAND/UL (ref 140–400)
PMV BLD REES-ECKER: 10.6 FL (ref 7.5–12.5)
POTASSIUM SERPL-SCNC: 5.3 MMOL/L (ref 3.5–5.3)
RBC # BLD AUTO: 4.78 MILLION/UL (ref 3.8–5.1)
SODIUM SERPL-SCNC: 137 MMOL/L (ref 135–146)
WBC # BLD AUTO: 7.3 THOUSAND/UL (ref 3.8–10.8)

## 2025-06-18 PROCEDURE — 99213 OFFICE O/P EST LOW 20 MIN: CPT

## 2025-06-18 RX ORDER — FLUCONAZOLE 150 MG/1
150 TABLET ORAL ONCE
Qty: 1 TABLET | Refills: 0 | Status: SHIPPED | OUTPATIENT
Start: 2025-06-18 | End: 2025-06-18

## 2025-06-20 ENCOUNTER — HOME CARE VISIT (OUTPATIENT)
Dept: HOME HEALTH SERVICES | Facility: HOME HEALTH | Age: 50
End: 2025-06-20
Payer: COMMERCIAL

## 2025-06-20 VITALS
HEART RATE: 88 BPM | RESPIRATION RATE: 18 BRPM | OXYGEN SATURATION: 98 % | TEMPERATURE: 97.8 F | SYSTOLIC BLOOD PRESSURE: 118 MMHG | DIASTOLIC BLOOD PRESSURE: 70 MMHG

## 2025-06-20 PROCEDURE — G0299 HHS/HOSPICE OF RN EA 15 MIN: HCPCS

## 2025-06-20 ASSESSMENT — ENCOUNTER SYMPTOMS
PAIN: 1
HIGHEST PAIN SEVERITY IN PAST 24 HOURS: 6/10
PERSON REPORTING PAIN: PATIENT
APPETITE LEVEL: GOOD

## 2025-06-20 NOTE — Clinical Note
pts picc line is out 4cm. reported to ID office. nno at this time. picc is flushing and giving blood return.

## 2025-06-20 NOTE — HOME HEALTH
pt seen due to loose picc line dressing. pt c/o pain and tenderness in the area. picc line had slipped out about 4cm. called to report to ID office, nno. picc line is flushing and giving blood return. old picc line dressing removed and replaced according to protocol. cap and extension not replaced, will be replaced after 7 days. no issues w infusions reported. pain in the foot is 6/10 at the worst. no reported falls. no changes in med. vitals wnl

## 2025-06-23 ENCOUNTER — DOCUMENTATION (OUTPATIENT)
Dept: INFUSION THERAPY | Age: 50
End: 2025-06-23
Payer: COMMERCIAL

## 2025-06-23 ENCOUNTER — HOME INFUSION (OUTPATIENT)
Dept: INFUSION THERAPY | Age: 50
End: 2025-06-23
Payer: COMMERCIAL

## 2025-06-23 NOTE — PROGRESS NOTES
Delivery of the infusion medication and all supplies, including standard amount of flushes and dressing, is scheduled for tomorrow evening, Tuesday 6/24/25.   Sending Opsite 4x5 w/dressing supplies  - per request.

## 2025-06-23 NOTE — PROGRESS NOTES
"Reviewed chartBeatricetone Gibbs is receiving cefazolin for DM foot wound thru 7/10     Followed by Dr Gabriel     Labs from 06/17 reviewed - unremarkable. Creatinine back down to 1.09. BUN slightly higher at 30    RN notes from 6/20 reviewed - PICC line slipped out approx 4cm. Still flushing and giving blood return.    Tel call to patient, confirmed PICC line slipped out but still infusing. She does have to keep her arm straight for it to work. Reports no issues with the dressing but has some irritation from tape. Reports \"hypoallergenic tape\" being worse, but per records it doesn't look like we've sent her anything before. Will send paper hypoallergenic tape with this delivery, she'll try it and if it doesn't work well she can go back to original tape. Agreeable to delivery tomorrow 6/24 between 5-9pm for medications and all supplies to match. No questions for pharmacist.     Mixing 6/23 and dispensing 6/24 with supplies to match   24x cefazolin syringe  DOS 6/25-7/2     Follow up 7/1 check labs, check progress, OVN remainder  "

## 2025-06-25 ENCOUNTER — HOME CARE VISIT (OUTPATIENT)
Dept: HOME HEALTH SERVICES | Facility: HOME HEALTH | Age: 50
End: 2025-06-25
Payer: COMMERCIAL

## 2025-06-25 VITALS
OXYGEN SATURATION: 99 % | SYSTOLIC BLOOD PRESSURE: 130 MMHG | TEMPERATURE: 97.4 F | DIASTOLIC BLOOD PRESSURE: 70 MMHG | HEART RATE: 80 BPM

## 2025-06-25 RX ADMIN — Medication 5 ML: at 08:00

## 2025-06-25 RX ADMIN — SODIUM CHLORIDE 20 ML: 9 INJECTION, SOLUTION INTRAMUSCULAR; INTRAVENOUS; SUBCUTANEOUS at 08:00

## 2025-06-25 ASSESSMENT — ENCOUNTER SYMPTOMS
LOWEST PAIN SEVERITY IN PAST 24 HOURS: 3/10
HIGHEST PAIN SEVERITY IN PAST 24 HOURS: 6/10
APPETITE LEVEL: GOOD
PERSON REPORTING PAIN: PATIENT
PAIN: 1

## 2025-06-25 NOTE — Clinical Note
pts picc line migrated out. was 4cm friday. called id office- never heard back. is out 7cm today. still flushing and giving blood return. pt is hesitant to go to ED. alvaro labs this AM- taking to [place].

## 2025-06-25 NOTE — HOME HEALTH
pt seen for routine visit. pts picc line has migrated out, it is now out 7cm. called ID office again and left another message w covering md. pt is informed that she may have to go to ed to have line assessed. line is still flushing and giving blood return. no issues w infusions noted. old picc line dressing removed and replaced according to protocol. cap and extension also replaced. due to be changed again in one week unless needed sooner. picc is flushing and giving blood return. labs drawn according to md order. labs dropped off at [place] [facility]. pt denies any recent falls. continues to do daily wound care as ditrected by podiatrist. has 6/10 foot pain at times. hurts more inbetween the toes. denies sob, pulm effort and rate normal. no change in bowel pattern or appetite. no issues w urination noted. denies changes in meds. vitals wnl. f/u w anton 7/2.

## 2025-06-28 ENCOUNTER — APPOINTMENT (OUTPATIENT)
Dept: RADIOLOGY | Facility: HOSPITAL | Age: 50
End: 2025-06-28
Payer: COMMERCIAL

## 2025-06-28 ENCOUNTER — HOSPITAL ENCOUNTER (EMERGENCY)
Facility: HOSPITAL | Age: 50
Discharge: HOME | End: 2025-06-28
Payer: COMMERCIAL

## 2025-06-28 ENCOUNTER — HOME CARE VISIT (OUTPATIENT)
Dept: HOME HEALTH SERVICES | Facility: HOME HEALTH | Age: 50
End: 2025-06-28
Payer: COMMERCIAL

## 2025-06-28 VITALS
SYSTOLIC BLOOD PRESSURE: 118 MMHG | OXYGEN SATURATION: 99 % | RESPIRATION RATE: 18 BRPM | DIASTOLIC BLOOD PRESSURE: 84 MMHG | HEART RATE: 86 BPM | TEMPERATURE: 97.6 F

## 2025-06-28 VITALS
HEART RATE: 88 BPM | DIASTOLIC BLOOD PRESSURE: 77 MMHG | SYSTOLIC BLOOD PRESSURE: 156 MMHG | RESPIRATION RATE: 16 BRPM | OXYGEN SATURATION: 100 % | TEMPERATURE: 96.8 F

## 2025-06-28 DIAGNOSIS — T82.838A BLEEDING FROM PICC LINE, INITIAL ENCOUNTER: ICD-10-CM

## 2025-06-28 DIAGNOSIS — T82.9XXA CENTRAL LINE COMPLICATION, INITIAL ENCOUNTER: Primary | ICD-10-CM

## 2025-06-28 PROCEDURE — G0299 HHS/HOSPICE OF RN EA 15 MIN: HCPCS

## 2025-06-28 PROCEDURE — 71046 X-RAY EXAM CHEST 2 VIEWS: CPT | Mod: FOREIGN READ | Performed by: RADIOLOGY

## 2025-06-28 PROCEDURE — 99283 EMERGENCY DEPT VISIT LOW MDM: CPT | Mod: 25

## 2025-06-28 PROCEDURE — 71046 X-RAY EXAM CHEST 2 VIEWS: CPT

## 2025-06-28 ASSESSMENT — LIFESTYLE VARIABLES
HAVE PEOPLE ANNOYED YOU BY CRITICIZING YOUR DRINKING: NO
EVER FELT BAD OR GUILTY ABOUT YOUR DRINKING: NO
HAVE YOU EVER FELT YOU SHOULD CUT DOWN ON YOUR DRINKING: NO
EVER HAD A DRINK FIRST THING IN THE MORNING TO STEADY YOUR NERVES TO GET RID OF A HANGOVER: NO
TOTAL SCORE: 0

## 2025-06-28 ASSESSMENT — PAIN SCALES - GENERAL: PAINLEVEL_OUTOF10: 0 - NO PAIN

## 2025-06-28 ASSESSMENT — PAIN - FUNCTIONAL ASSESSMENT: PAIN_FUNCTIONAL_ASSESSMENT: 0-10

## 2025-06-28 NOTE — ED PROVIDER NOTES
Emergency Department Provider Note        History of Present Illness     History provided by: Patient  Limitations to History: None    HPI:  Patricia Gibbs is a 49 y.o. female currently on IV antibiotics for an osteomyelitis of her right foot presents to the emergency department today due to PICC line migration.  Patient states that the PICC line she has in her left arm seems to have pulled out a few more millimeters since yesterday and she has some bleeding around the insertion point.  Does not appear to be continuing to bleed.  She was able to draw back blood and give herself her medication this morning despite the dislodgment.  She wanted to come in to have x-ray imaging to rule out any concern for dislodgment of the IV from its termination point.    Physical Exam   Triage vitals:  T 36 °C (96.8 °F)  HR 88  /77  RR 16  O2 100 % None (Room air)    Physical Exam  Vitals and nursing note reviewed.   Constitutional:       General: She is not in acute distress.     Appearance: She is well-developed.   HENT:      Head: Normocephalic and atraumatic.   Eyes:      Conjunctiva/sclera: Conjunctivae normal.   Cardiovascular:      Rate and Rhythm: Normal rate and regular rhythm.      Heart sounds: No murmur heard.  Pulmonary:      Effort: Pulmonary effort is normal. No respiratory distress.      Breath sounds: Normal breath sounds.   Abdominal:      Palpations: Abdomen is soft.      Tenderness: There is no abdominal tenderness.   Musculoskeletal:         General: No swelling.      Cervical back: Neck supple.   Skin:     General: Skin is warm and dry.      Capillary Refill: Capillary refill takes less than 2 seconds.      Comments: PICC line present in left upper extremity, dressing intact, lying padding does appear to have bright red blood but no active bleeding.  No tenderness to palpation, distal pulses and sensation intact.   Neurological:      Mental Status: She is alert.   Psychiatric:         Mood and  Affect: Mood normal.          Medical Decision Making & ED Course   Medical Decision Makin y.o. female currently on IV antibiotics for an osteomyelitis of her right foot presents to the emergency department today due to PICC line migration.  Patient states that the PICC line she has in her left arm seems to have pulled out a few more millimeters since yesterday and she has some bleeding around the insertion point.  Does not appear to be continuing to bleed.  She was able to draw back blood and give herself her medication this morning despite the dislodgment.  She wanted to come in to have x-ray imaging to rule out any concern for dislodgment of the IV from its termination point.  X-ray was obtained, it appears that there is no acute process, from my read it appears that the termination point is open of the appropriate area of the SVC however there is no mention of it on radiology initially, I did reach out to radiology to comment on location of the termination point.  I did discuss this with the patient, she does not want to wait for the final results.  I will call if radiology addended to be in an abnormal location.  She has no other complaints at this time and I do believe she is safe for discharge home.  ----  Differential diagnoses considered include but are not limited to: PICC line malfunction, accidental dislodgment     Social Determinants of Health which Significantly Impact Care: None identified     EKG Independent Interpretation: EKG not obtained    Independent Result Review and Interpretation: Relevant laboratory and radiographic results were reviewed and independently interpreted by myself.  As necessary, they are commented on in the ED Course.    Chronic conditions affecting the patient's care: As documented above in [facility]    The patient was discussed with the following consultants/services: None    Care Considerations: As documented above in [facility]    ED Course:  ED Course as of 25 1525   Sat  Jun 28, 2025   1337 XR chest 2 views  No acute process. [WS]   1524 Radiology addendum does note that the PICC line termination point is in the SVC which is appropriate for the PICC line. [WS]      ED Course User Index  [WS] SEBASTIEN Devreis         Diagnoses as of 06/28/25 1525   Central line complication, initial encounter   Bleeding from PICC line, initial encounter     Disposition   As a result of the work-up, the patient was discharged home.  she was informed of her diagnosis and instructed to come back with any concerns or worsening of condition.  she and was agreeable to the plan as discussed above.  she was given the opportunity to ask questions.  All of the patient's questions were answered.    Procedures   Procedures    Patient was seen independently    SEBASTIEN Devries  Emergency Medicine         SEBASTIEN Devries  06/28/25 1525

## 2025-06-28 NOTE — ED TRIAGE NOTES
Pt from home with Picc line bleeding. 7cm removed over the last 5 weeks. Still able to draw blood and give ATB with no issues.

## 2025-06-28 NOTE — HOME HEALTH
pt reached out to RN this AM due to bloody picc line dressing. referred pt to ED due to recurrent issues. placement was confirmed in ED . saw pt to change picc dressing. old picc line dressing removed and replaced according to protocol. due to be changed again in one week unless needed sooner. picc is flushing and giving blood return. will return to see pt for routine visit on tuesday. pt started oral abx for foot infection. added to med list. vitals wnl. no other concerns.

## 2025-06-28 NOTE — ED TRIAGE NOTES
The patient was seen and examined in triage.    History of Present Illness: The patient is a 49-year-old female presents emergency department for assessment of a PICC line issue.  She reports that she has had her PICC line slowly coming out.  She reports that the PICC line is still working.  She was able to do her antibiotics today and has been able to pull blood out of the PICC line.  She reports that her nurse went to change the dressing today and there is a little bit of blood.  The nurse was concerned that if she remove the dressing she would pull the PICC line out further.  She was recommended to go to the ER for further assessment of her PICC line.  She reports that the area is a little bit uncomfortable but she reports that she has been otherwise doing well.  Reports that her foot is healing.  She is to have the PICC line removed July 10 which is in about 2 weeks.    Brief Physical Exam:  Exam is limited by the patient sitting in a chair in triage.   Heart: Regular rate and rhythm.   Lungs: Clear to auscultation bilaterally.   Skin: PICC line intact to the left upper extremity    Plan: Chest x-ray ordered to see if PICC line is intact    For the remainder of the patient's workup and ED course, please refer to the main ED provider note. We discussed need for diagnostic testing including laboratory studies and imaging.  We also discussed that they may be asked to wait in the waiting room while these tests are pending.  They understand that if they choose to leave without having the testing completed or resulted that we cannot rule out acute life threatening illnesses and the risks involved could lead to worsening condition, permanent disability or even death.      Disclaimer: This note was dictated by speech recognition. Minor errors in transcription may be present. Please call if questions.

## 2025-06-30 ENCOUNTER — HOME INFUSION (OUTPATIENT)
Dept: INFUSION THERAPY | Age: 50
End: 2025-06-30
Payer: COMMERCIAL

## 2025-06-30 NOTE — PROGRESS NOTES
Chart review - Patricia Gibbs is a 49 y.o. patient receiving home care for diabetic foot wound.  Med - cefazolin 1 gm q8 thru 7/10. Dr. Gabriel following    Labs from 6/26 available in [place] and reviewed- Cr 1.29, WBC 2.1, CRP 26.6 mg/L, ESR 2  RN visits reviewed- pt seen in ED for PICC line concerns, discharged with no changes    Rph call to pt, tolerating infusions well, confirmed doses in home thru 7/2. Agreeable to delivery of remainder of medication and supplies. Pt requesting delivery 7/1 due to travel for holiday.     Rph offered to  - pt stated no questions at this time.    Pharmacy to mix 7/1 and deliver straight with supplies to match:  24x cefazolin 1 gm syringes  DOS: 7/3 - 7/10    Follow up 7/10 - POC Dr. Gabriel

## 2025-07-02 ENCOUNTER — OFFICE VISIT (OUTPATIENT)
Dept: WOUND CARE | Facility: CLINIC | Age: 50
End: 2025-07-02
Payer: COMMERCIAL

## 2025-07-02 ENCOUNTER — HOME INFUSION (OUTPATIENT)
Dept: INFUSION THERAPY | Age: 50
End: 2025-07-02
Payer: COMMERCIAL

## 2025-07-02 ENCOUNTER — HOME CARE VISIT (OUTPATIENT)
Dept: HOME HEALTH SERVICES | Facility: HOME HEALTH | Age: 50
End: 2025-07-02
Payer: COMMERCIAL

## 2025-07-02 VITALS
OXYGEN SATURATION: 100 % | RESPIRATION RATE: 14 BRPM | HEART RATE: 86 BPM | SYSTOLIC BLOOD PRESSURE: 130 MMHG | DIASTOLIC BLOOD PRESSURE: 84 MMHG | TEMPERATURE: 99.1 F

## 2025-07-02 DIAGNOSIS — M86.171 ACUTE OSTEOMYELITIS OF RIGHT FOOT (MULTI): Primary | ICD-10-CM

## 2025-07-02 LAB
ANION GAP SERPL CALCULATED.4IONS-SCNC: 13 MMOL/L (CALC) (ref 7–17)
BUN SERPL-MCNC: 25 MG/DL (ref 7–25)
BUN/CREAT SERPL: 22 (CALC) (ref 6–22)
CALCIUM SERPL-MCNC: 9.1 MG/DL (ref 8.6–10.2)
CHLORIDE SERPL-SCNC: 103 MMOL/L (ref 98–110)
CO2 SERPL-SCNC: 21 MMOL/L (ref 20–32)
CREAT SERPL-MCNC: 1.15 MG/DL (ref 0.5–0.99)
CRP SERPL-MCNC: 6.2 MG/L
EGFRCR SERPLBLD CKD-EPI 2021: 58 ML/MIN/1.73M2
ERYTHROCYTE [DISTWIDTH] IN BLOOD BY AUTOMATED COUNT: 14.9 % (ref 11–15)
GLUCOSE SERPL-MCNC: 174 MG/DL (ref 65–99)
HCT VFR BLD AUTO: 41.6 % (ref 35–45)
HGB BLD-MCNC: 13.2 G/DL (ref 11.7–15.5)
MCH RBC QN AUTO: 28.1 PG (ref 27–33)
MCHC RBC AUTO-ENTMCNC: 31.7 G/DL (ref 32–36)
MCV RBC AUTO: 88.5 FL (ref 80–100)
PLATELET # BLD AUTO: 222 THOUSAND/UL (ref 140–400)
PMV BLD REES-ECKER: 10.5 FL (ref 7.5–12.5)
POTASSIUM SERPL-SCNC: 4.8 MMOL/L (ref 3.5–5.3)
RBC # BLD AUTO: 4.7 MILLION/UL (ref 3.8–5.1)
SODIUM SERPL-SCNC: 137 MMOL/L (ref 135–146)
WBC # BLD AUTO: 8.1 THOUSAND/UL (ref 3.8–10.8)

## 2025-07-02 PROCEDURE — 99213 OFFICE O/P EST LOW 20 MIN: CPT

## 2025-07-02 PROCEDURE — G0299 HHS/HOSPICE OF RN EA 15 MIN: HCPCS

## 2025-07-02 RX ADMIN — Medication 5 ML: at 12:00

## 2025-07-02 RX ADMIN — SODIUM CHLORIDE 10 ML: 9 INJECTION, SOLUTION INTRAMUSCULAR; INTRAVENOUS; SUBCUTANEOUS at 12:00

## 2025-07-02 ASSESSMENT — ENCOUNTER SYMPTOMS
PAIN: 1
APPETITE LEVEL: GOOD
PERSON REPORTING PAIN: PATIENT

## 2025-07-02 NOTE — PROGRESS NOTES
[facility] rec'd call from Dr. Gabriel - stop date confirmed 7/10 and PICC can be removed by [facility] RN.    Order entered in epic    Follow up as previously planned, confirm line removed and discharge from pharmacy service

## 2025-07-02 NOTE — HOME HEALTH
pt seen for routine visit. pt had labs drawn yesterday, she wanted them available for appt w ID today. picc line will remain in place until 7/10. pt will continue to do medication 3x daily until 7/10. no issues w infusions noted. old picc line dressing removed and replaced according to protocol. cap and extension also replaced. due to be changed again in one week unless needed sooner. picc is flushing and giving blood return. wound care was completed at ID appt this AM- wound not addressed at this time. will assess wound at next visit. pt denies any sob. pulm effort and rate normal. no issues noted  wusing the bathroom or appetite. denies changes in meds. vitals wnl. no other concerns,

## 2025-07-06 ENCOUNTER — HOME CARE VISIT (OUTPATIENT)
Dept: HOME HEALTH SERVICES | Facility: HOME HEALTH | Age: 50
End: 2025-07-06
Payer: COMMERCIAL

## 2025-07-06 VITALS — OXYGEN SATURATION: 98 % | HEART RATE: 77 BPM | RESPIRATION RATE: 12 BRPM | TEMPERATURE: 98.5 F

## 2025-07-06 PROCEDURE — G0299 HHS/HOSPICE OF RN EA 15 MIN: HCPCS

## 2025-07-06 ASSESSMENT — ENCOUNTER SYMPTOMS
PAIN: SEE NOTE
PAIN SEVERITY GOAL: 4/10
PAIN: 1
LOWEST PAIN SEVERITY IN PAST 24 HOURS: 4/10
HIGHEST PAIN SEVERITY IN PAST 24 HOURS: 5/10

## 2025-07-07 ENCOUNTER — HOME CARE VISIT (OUTPATIENT)
Dept: HOME HEALTH SERVICES | Facility: HOME HEALTH | Age: 50
End: 2025-07-07
Payer: COMMERCIAL

## 2025-07-07 VITALS
HEART RATE: 96 BPM | DIASTOLIC BLOOD PRESSURE: 80 MMHG | SYSTOLIC BLOOD PRESSURE: 122 MMHG | RESPIRATION RATE: 16 BRPM | TEMPERATURE: 98.5 F | OXYGEN SATURATION: 100 %

## 2025-07-07 PROCEDURE — G0299 HHS/HOSPICE OF RN EA 15 MIN: HCPCS

## 2025-07-07 RX ADMIN — SODIUM CHLORIDE 10 ML: 9 INJECTION, SOLUTION INTRAMUSCULAR; INTRAVENOUS; SUBCUTANEOUS at 12:00

## 2025-07-07 ASSESSMENT — ACTIVITIES OF DAILY LIVING (ADL)
OASIS_M1830: 00
HOME_HEALTH_OASIS: 00

## 2025-07-07 ASSESSMENT — ENCOUNTER SYMPTOMS: PERSON REPORTING PAIN: PATIENT

## 2025-07-07 NOTE — Clinical Note
picc line removed. flushed and got blood return prior to pulling. pt tolerated well. pt dced from [facility].

## 2025-07-07 NOTE — HOME HEALTH
pt seen for picc line pull and dc. pt was seen yesterday for picc line issues. recieved order to pull line today. was able to flush line and get blood return. removed picc line, pt tolerated well. area covered w vaseline and gauze. pt denies the need for furhter [facility] services and wishes to be dced. did give pt phone # for [email or web address] to order wound care supplies as needed. pt denies the need for wound care today. pain stable. no reported falls. no changes in meds. pt sob free. appetite remains unchanged. no issues using the bathroom. vitals wnl. pt dced from [facility].

## 2025-07-20 ENCOUNTER — PATIENT MESSAGE (OUTPATIENT)
Dept: PRIMARY CARE | Facility: CLINIC | Age: 50
End: 2025-07-20
Payer: COMMERCIAL

## 2025-07-20 DIAGNOSIS — E11.9 TYPE 2 DIABETES MELLITUS WITHOUT COMPLICATION, WITHOUT LONG-TERM CURRENT USE OF INSULIN: ICD-10-CM

## 2025-07-22 RX ORDER — DAPAGLIFLOZIN 5 MG/1
5 TABLET, FILM COATED ORAL DAILY
Qty: 90 TABLET | Refills: 1 | Status: SHIPPED | OUTPATIENT
Start: 2025-07-22 | End: 2026-07-22

## 2025-08-12 DIAGNOSIS — K21.9 GASTROESOPHAGEAL REFLUX DISEASE WITHOUT ESOPHAGITIS: ICD-10-CM

## 2025-08-12 RX ORDER — PANTOPRAZOLE SODIUM 40 MG/1
40 TABLET, DELAYED RELEASE ORAL DAILY
Qty: 90 TABLET | Refills: 0 | Status: SHIPPED | OUTPATIENT
Start: 2025-08-12

## 2025-08-14 ENCOUNTER — APPOINTMENT (OUTPATIENT)
Dept: CARDIOLOGY | Facility: HOSPITAL | Age: 50
End: 2025-08-14
Payer: COMMERCIAL

## 2025-08-19 DIAGNOSIS — E11.9 TYPE 2 DIABETES MELLITUS WITHOUT COMPLICATION, WITHOUT LONG-TERM CURRENT USE OF INSULIN: ICD-10-CM

## 2025-08-19 RX ORDER — SEMAGLUTIDE 0.68 MG/ML
0.5 INJECTION, SOLUTION SUBCUTANEOUS
Qty: 3 ML | Refills: 2 | Status: SHIPPED | OUTPATIENT
Start: 2025-08-19

## 2025-09-11 ENCOUNTER — APPOINTMENT (OUTPATIENT)
Dept: PRIMARY CARE | Facility: CLINIC | Age: 50
End: 2025-09-11
Payer: COMMERCIAL

## 2025-09-25 ENCOUNTER — APPOINTMENT (OUTPATIENT)
Dept: CARDIOLOGY | Facility: HOSPITAL | Age: 50
End: 2025-09-25
Payer: COMMERCIAL

## 2025-10-23 ENCOUNTER — APPOINTMENT (OUTPATIENT)
Dept: CARDIOLOGY | Facility: HOSPITAL | Age: 50
End: 2025-10-23
Payer: COMMERCIAL

## 2025-11-04 ENCOUNTER — APPOINTMENT (OUTPATIENT)
Dept: PODIATRY | Facility: CLINIC | Age: 50
End: 2025-11-04
Payer: COMMERCIAL

## (undated) DEVICE — NEEDLE, HYPODERMIC, MONOJECT, 27 G X 1.5 IN

## (undated) DEVICE — BANDAGE, ESMARK 4 IN X 9 FT, STERILE

## (undated) DEVICE — Device

## (undated) DEVICE — COVER, XRAY, CASSETTE, 21 X 40 IN, STERILE

## (undated) DEVICE — BLADE, OSCILLATING/SAGITTAL, 25MM X 9MM

## (undated) DEVICE — TUBING, IRRIGATION, HIGH FLOW, HAND PIECE SET

## (undated) DEVICE — PACKING, PLAIN, CURITY, 0.25 IN X 5 YD, STERILE

## (undated) DEVICE — BANDAGE, STRETCH, CONFORM, 3 IN X 4.1 YD, STERILE